# Patient Record
Sex: MALE | Race: BLACK OR AFRICAN AMERICAN | NOT HISPANIC OR LATINO
[De-identification: names, ages, dates, MRNs, and addresses within clinical notes are randomized per-mention and may not be internally consistent; named-entity substitution may affect disease eponyms.]

---

## 2018-01-30 ENCOUNTER — APPOINTMENT (OUTPATIENT)
Dept: CARDIOLOGY | Facility: CLINIC | Age: 58
End: 2018-01-30
Payer: COMMERCIAL

## 2018-01-30 ENCOUNTER — RECORD ABSTRACTING (OUTPATIENT)
Age: 58
End: 2018-01-30

## 2018-01-30 DIAGNOSIS — Z82.49 FAMILY HISTORY OF ISCHEMIC HEART DISEASE AND OTHER DISEASES OF THE CIRCULATORY SYSTEM: ICD-10-CM

## 2018-01-30 DIAGNOSIS — Z87.19 PERSONAL HISTORY OF OTHER DISEASES OF THE DIGESTIVE SYSTEM: ICD-10-CM

## 2018-01-30 DIAGNOSIS — Z84.89 FAMILY HISTORY OF OTHER SPECIFIED CONDITIONS: ICD-10-CM

## 2018-01-30 DIAGNOSIS — Z78.9 OTHER SPECIFIED HEALTH STATUS: ICD-10-CM

## 2018-01-30 PROCEDURE — A9500: CPT

## 2018-01-30 PROCEDURE — 93015 CV STRESS TEST SUPVJ I&R: CPT

## 2018-01-30 PROCEDURE — 78452 HT MUSCLE IMAGE SPECT MULT: CPT

## 2018-01-30 RX ORDER — AMLODIPINE BESYLATE 5 MG/1
5 TABLET ORAL
Refills: 0 | Status: ACTIVE | COMMUNITY

## 2018-01-30 RX ORDER — METOPROLOL TARTRATE 25 MG/1
25 TABLET, FILM COATED ORAL
Refills: 0 | Status: ACTIVE | COMMUNITY

## 2018-02-01 ENCOUNTER — APPOINTMENT (OUTPATIENT)
Dept: CARDIOLOGY | Facility: CLINIC | Age: 58
End: 2018-02-01
Payer: COMMERCIAL

## 2018-02-01 PROCEDURE — ZZZZZ: CPT

## 2018-02-12 RX ORDER — KIT FOR THE PREPARATION OF TECHNETIUM TC99M SESTAMIBI 1 MG/5ML
INJECTION, POWDER, LYOPHILIZED, FOR SOLUTION PARENTERAL
Refills: 0 | Status: COMPLETED | OUTPATIENT
Start: 2018-02-12

## 2018-02-12 RX ADMIN — KIT FOR THE PREPARATION OF TECHNETIUM TC99M SESTAMIBI 0: 1 INJECTION, POWDER, LYOPHILIZED, FOR SOLUTION PARENTERAL at 00:00

## 2018-02-14 RX ORDER — KIT FOR THE PREPARATION OF TECHNETIUM TC99M SESTAMIBI 1 MG/5ML
INJECTION, POWDER, LYOPHILIZED, FOR SOLUTION PARENTERAL
Refills: 0 | Status: COMPLETED | OUTPATIENT
Start: 2018-02-14

## 2018-02-14 RX ADMIN — KIT FOR THE PREPARATION OF TECHNETIUM TC99M SESTAMIBI 0: 1 INJECTION, POWDER, LYOPHILIZED, FOR SOLUTION PARENTERAL at 00:00

## 2018-02-26 ENCOUNTER — APPOINTMENT (OUTPATIENT)
Dept: CARDIOLOGY | Facility: CLINIC | Age: 58
End: 2018-02-26
Payer: COMMERCIAL

## 2018-02-26 VITALS
WEIGHT: 264 LBS | DIASTOLIC BLOOD PRESSURE: 83 MMHG | SYSTOLIC BLOOD PRESSURE: 124 MMHG | RESPIRATION RATE: 12 BRPM | HEIGHT: 72 IN | BODY MASS INDEX: 35.76 KG/M2

## 2018-02-26 DIAGNOSIS — Z00.00 ENCOUNTER FOR GENERAL ADULT MEDICAL EXAMINATION W/OUT ABNORMAL FINDINGS: ICD-10-CM

## 2018-02-26 PROCEDURE — 99214 OFFICE O/P EST MOD 30 MIN: CPT

## 2018-02-26 RX ORDER — HYDROCHLOROTHIAZIDE 12.5 MG/1
12.5 CAPSULE ORAL
Refills: 0 | Status: ACTIVE | COMMUNITY

## 2018-06-13 ENCOUNTER — APPOINTMENT (OUTPATIENT)
Dept: CARDIOLOGY | Facility: CLINIC | Age: 58
End: 2018-06-13
Payer: COMMERCIAL

## 2018-06-13 VITALS
WEIGHT: 260 LBS | SYSTOLIC BLOOD PRESSURE: 125 MMHG | BODY MASS INDEX: 35.21 KG/M2 | HEART RATE: 76 BPM | DIASTOLIC BLOOD PRESSURE: 90 MMHG | HEIGHT: 72 IN | RESPIRATION RATE: 16 BRPM

## 2018-06-13 DIAGNOSIS — R06.09 OTHER FORMS OF DYSPNEA: ICD-10-CM

## 2018-06-13 PROCEDURE — 99214 OFFICE O/P EST MOD 30 MIN: CPT

## 2018-06-13 PROCEDURE — 93000 ELECTROCARDIOGRAM COMPLETE: CPT

## 2018-12-04 ENCOUNTER — OTHER (OUTPATIENT)
Age: 58
End: 2018-12-04

## 2018-12-04 ENCOUNTER — APPOINTMENT (OUTPATIENT)
Dept: CARDIOLOGY | Facility: CLINIC | Age: 58
End: 2018-12-04

## 2019-09-03 ENCOUNTER — APPOINTMENT (OUTPATIENT)
Dept: CARDIOLOGY | Facility: CLINIC | Age: 59
End: 2019-09-03
Payer: COMMERCIAL

## 2019-09-03 VITALS
OXYGEN SATURATION: 98 % | RESPIRATION RATE: 14 BRPM | SYSTOLIC BLOOD PRESSURE: 135 MMHG | DIASTOLIC BLOOD PRESSURE: 70 MMHG

## 2019-09-03 PROCEDURE — 93000 ELECTROCARDIOGRAM COMPLETE: CPT

## 2019-09-03 PROCEDURE — 99212 OFFICE O/P EST SF 10 MIN: CPT

## 2019-09-04 ENCOUNTER — NON-APPOINTMENT (OUTPATIENT)
Age: 59
End: 2019-09-04

## 2019-09-04 ENCOUNTER — APPOINTMENT (OUTPATIENT)
Dept: CARDIOLOGY | Facility: CLINIC | Age: 59
End: 2019-09-04
Payer: COMMERCIAL

## 2019-09-04 VITALS
BODY MASS INDEX: 36.03 KG/M2 | OXYGEN SATURATION: 98 % | RESPIRATION RATE: 16 BRPM | HEIGHT: 72 IN | DIASTOLIC BLOOD PRESSURE: 82 MMHG | WEIGHT: 266 LBS | SYSTOLIC BLOOD PRESSURE: 134 MMHG | HEART RATE: 89 BPM

## 2019-09-04 DIAGNOSIS — E66.9 OBESITY, UNSPECIFIED: ICD-10-CM

## 2019-09-04 PROCEDURE — 99214 OFFICE O/P EST MOD 30 MIN: CPT

## 2019-09-04 PROCEDURE — 93000 ELECTROCARDIOGRAM COMPLETE: CPT

## 2019-09-04 NOTE — HISTORY OF PRESENT ILLNESS
[FreeTextEntry1] : He admits that he normally eats a well balanced diet low in fats and meats, however, earlier that day he had pancakes with sausage, butter and syrup and subsequently went to the movies with his wife and ate nachos with cheese.\par \par He has not felt these symptoms since that day.\par \par The patient reports a strong family history of cardiovascular disease; Father- CABG x 3 (81 y/o), Mother- brothers have history of stroke.

## 2019-09-04 NOTE — REASON FOR VISIT
[FreeTextEntry1] : The patient is a 59-year-old  gentleman who has been evaluated in our office for prior history for hypertension and hyperlipidemia who underwent cardiac catheterization back in 2006 which showed "normal coronary arteries".  Mr. Lara reports noticing a "chest pressure" located mid-sternal that slightly radiated to his left arm/shoulder, this occurred at 4:00 am and woke him out of sleep.  He reports belching a couple of time and it eventually subsided in about one hour or more.\par \par He denies associated symptoms such as SOB, BLAKE, PND, orthopnea, palpitations, presyncope, syncope, diaphoresis.

## 2019-09-04 NOTE — DISCUSSION/SUMMARY
[FreeTextEntry1] : 1).  Patient reassured his rather atypical chest complaints seem to be less cardiac in nature and more gastrointestinal; possible GERD-like symptoms from eating sausage, pancakes and nachos late at night.\par \par He is to monitor for chest discomfort or similar symptoms to worsen and/or become more frequent, notify our office immediately and we will consider repeating Nuclear Stress test and/or Cardiac Catheterization.\par \par 2).  Follow up with his Gastroenterologist (Dr. Zuleta) regarding possible evaluation of GERD; endoscopy vs. medical management.\par \par 3).  Diet and lifestyle modification discussed including low fat and low carbohydrate weight reducing diet as well as avoiding trigger foods for GERD.  Continue to implement aerobic exercise regimen few days per week. \par \par 4).  Patient is tolerating cardiac medications without negative side effects, continue with current cardiac medication regimen including HCTZ 12.5 mg QD PRN, Norvasc 5 mg QD and Metoprolol Tartrate 25 mg QD. \par \par 5).  Follow up with PCP regarding routine checkups and blood work, have copy faxed to our office. \par \par 6).  Immediately go to the emergency department and/or report any untoward symptoms to our office.\par \par 7).  Follow up with our office in 5 to 6 months or PRN.

## 2019-09-04 NOTE — ASSESSMENT
[FreeTextEntry1] : EKG 9/4/2019:  The EKG illustrates sinus rhythm, rate of 86, borderline 1st degree AV block, nonspecific Q waves in III and aVF.  Essentially unchanged.\par \par Most recent nuclear stress test last one in January 2018 which was "negative for ischemia".

## 2019-09-04 NOTE — PHYSICAL EXAM
[Normal Appearance] : normal appearance [Normal Conjunctiva] : the conjunctiva exhibited no abnormalities [General Appearance - In No Acute Distress] : no acute distress [Normal Oral Mucosa] : normal oral mucosa [Normal Jugular Venous A Waves Present] : normal jugular venous A waves present [Normal Jugular Venous V Waves Present] : normal jugular venous V waves present [No Jugular Venous Encarnacion A Waves] : no jugular venous encarnacion A waves [] : no respiratory distress [Respiration, Rhythm And Depth] : normal respiratory rhythm and effort [Auscultation Breath Sounds / Voice Sounds] : lungs were clear to auscultation bilaterally [Heart Rate And Rhythm] : heart rate and rhythm were normal [Heart Sounds] : normal S1 and S2 [Edema] : no peripheral edema present [FreeTextEntry1] : Grade I/VI systolic murmur [Bowel Sounds] : normal bowel sounds [Nail Clubbing] : no clubbing of the fingernails [Abnormal Walk] : normal gait [Cyanosis, Localized] : no localized cyanosis [Skin Color & Pigmentation] : normal skin color and pigmentation [Oriented To Time, Place, And Person] : oriented to person, place, and time [Skin Turgor] : normal skin turgor [Impaired Insight] : insight and judgment were intact [Affect] : the affect was normal

## 2020-02-03 ENCOUNTER — APPOINTMENT (OUTPATIENT)
Dept: CARDIOLOGY | Facility: CLINIC | Age: 60
End: 2020-02-03
Payer: COMMERCIAL

## 2020-02-03 ENCOUNTER — NON-APPOINTMENT (OUTPATIENT)
Age: 60
End: 2020-02-03

## 2020-02-03 VITALS
WEIGHT: 263 LBS | RESPIRATION RATE: 14 BRPM | HEIGHT: 72 IN | DIASTOLIC BLOOD PRESSURE: 83 MMHG | BODY MASS INDEX: 35.62 KG/M2 | SYSTOLIC BLOOD PRESSURE: 128 MMHG | HEART RATE: 85 BPM

## 2020-02-03 DIAGNOSIS — R07.9 CHEST PAIN, UNSPECIFIED: ICD-10-CM

## 2020-02-03 DIAGNOSIS — R07.89 OTHER CHEST PAIN: ICD-10-CM

## 2020-02-03 DIAGNOSIS — R94.31 ABNORMAL ELECTROCARDIOGRAM [ECG] [EKG]: ICD-10-CM

## 2020-02-03 DIAGNOSIS — E78.5 HYPERLIPIDEMIA, UNSPECIFIED: ICD-10-CM

## 2020-02-03 DIAGNOSIS — I51.7 CARDIOMEGALY: ICD-10-CM

## 2020-02-03 DIAGNOSIS — I10 ESSENTIAL (PRIMARY) HYPERTENSION: ICD-10-CM

## 2020-02-03 PROCEDURE — 99214 OFFICE O/P EST MOD 30 MIN: CPT

## 2020-02-03 PROCEDURE — 93000 ELECTROCARDIOGRAM COMPLETE: CPT

## 2020-02-03 NOTE — PHYSICAL EXAM
[General Appearance - Well Developed] : well developed [Normal Appearance] : normal appearance [Well Groomed] : well groomed [General Appearance - Well Nourished] : well nourished [No Deformities] : no deformities [General Appearance - In No Acute Distress] : no acute distress [Normal Conjunctiva] : the conjunctiva exhibited no abnormalities [Eyelids - No Xanthelasma] : the eyelids demonstrated no xanthelasmas [Normal Oral Mucosa] : normal oral mucosa [No Oral Pallor] : no oral pallor [No Oral Cyanosis] : no oral cyanosis [Normal Jugular Venous A Waves Present] : normal jugular venous A waves present [Normal Jugular Venous V Waves Present] : normal jugular venous V waves present [No Jugular Venous Encarnacion A Waves] : no jugular venous encarnacion A waves [Respiration, Rhythm And Depth] : normal respiratory rhythm and effort [Exaggerated Use Of Accessory Muscles For Inspiration] : no accessory muscle use [Auscultation Breath Sounds / Voice Sounds] : lungs were clear to auscultation bilaterally [Heart Rate And Rhythm] : heart rate and rhythm were normal [Heart Sounds] : normal S1 and S2 [FreeTextEntry1] : Regular rhythm grade 1/6 systolic murmur [Abdomen Soft] : soft [Abdomen Tenderness] : non-tender [Abdomen Mass (___ Cm)] : no abdominal mass palpated [Gait - Sufficient For Exercise Testing] : the gait was sufficient for exercise testing [Abnormal Walk] : normal gait [Nail Clubbing] : no clubbing of the fingernails [Cyanosis, Localized] : no localized cyanosis [Petechial Hemorrhages (___cm)] : no petechial hemorrhages [Skin Color & Pigmentation] : normal skin color and pigmentation [] : no rash [No Venous Stasis] : no venous stasis [Skin Lesions] : no skin lesions [No Skin Ulcers] : no skin ulcer [No Xanthoma] : no  xanthoma was observed [Oriented To Time, Place, And Person] : oriented to person, place, and time [Mood] : the mood was normal [Affect] : the affect was normal [No Anxiety] : not feeling anxious

## 2020-02-03 NOTE — HISTORY OF PRESENT ILLNESS
[FreeTextEntry1] : He has had some other somatic complaints such as right shoulder discomfort from arthritis but otherwise seems to be doing better;\par \par He intends to be traveling to have her care and trying to get through political issues to open up an orphanage in [place];\par \par

## 2020-02-03 NOTE — REASON FOR VISIT
[Follow-Up - Clinic] : a clinic follow-up of [FreeTextEntry1] : The patient is a 59-year-old  gentleman with known history for hypertension and hyperlipidemia who had an atypical chest pain syndrome in the past and underwent cardiac testing including remote history for cardiac catheterization (2006-normal coronaries), and most recent nuclear stress test was in December 2018 and was negative for ischemia;\par \par Fortunately, the patient reports he's been feeling well lately without significant symptoms of chest pain, shortness of breath, palpitations or dizziness;

## 2020-02-03 NOTE — ASSESSMENT
[FreeTextEntry1] : EKG demonstrated normal sinus rhythm at a rate of 85 and within normal limits;\par \par In summary the patient is a 59-year-old gentleman with known history for mild hypertension and hyperlipidemia for which blood pressure appears controlled on current medical record\par \par Plan:\par \par Patient recommended to followup to primary care for lingering upper respiratory infection;\par \par Okay to continue current medical record\par \par Timely checkups and laboratory blood tests and lipid profile with primary care;\par \par No additional cardiac workup indicated at this time\par \par Consultation on ongoing efforts for low-carb low-fat weight reducing diet;;;;

## 2020-03-20 ENCOUNTER — INPATIENT (INPATIENT)
Facility: HOSPITAL | Age: 60
LOS: 8 days | Discharge: HOME | DRG: 193 | End: 2020-03-29
Attending: STUDENT IN AN ORGANIZED HEALTH CARE EDUCATION/TRAINING PROGRAM | Admitting: STUDENT IN AN ORGANIZED HEALTH CARE EDUCATION/TRAINING PROGRAM
Payer: COMMERCIAL

## 2020-03-20 VITALS
HEART RATE: 110 BPM | DIASTOLIC BLOOD PRESSURE: 78 MMHG | SYSTOLIC BLOOD PRESSURE: 155 MMHG | WEIGHT: 253.97 LBS | OXYGEN SATURATION: 91 % | RESPIRATION RATE: 22 BRPM | HEIGHT: 72 IN | TEMPERATURE: 101 F

## 2020-03-21 DIAGNOSIS — E11.9 TYPE 2 DIABETES MELLITUS WITHOUT COMPLICATIONS: ICD-10-CM

## 2020-03-21 DIAGNOSIS — J18.9 PNEUMONIA, UNSPECIFIED ORGANISM: ICD-10-CM

## 2020-03-21 DIAGNOSIS — E78.5 HYPERLIPIDEMIA, UNSPECIFIED: ICD-10-CM

## 2020-03-21 DIAGNOSIS — Z98.890 OTHER SPECIFIED POSTPROCEDURAL STATES: Chronic | ICD-10-CM

## 2020-03-21 DIAGNOSIS — D50.9 IRON DEFICIENCY ANEMIA, UNSPECIFIED: ICD-10-CM

## 2020-03-21 DIAGNOSIS — Z29.9 ENCOUNTER FOR PROPHYLACTIC MEASURES, UNSPECIFIED: ICD-10-CM

## 2020-03-21 DIAGNOSIS — J96.01 ACUTE RESPIRATORY FAILURE WITH HYPOXIA: ICD-10-CM

## 2020-03-21 DIAGNOSIS — I10 ESSENTIAL (PRIMARY) HYPERTENSION: ICD-10-CM

## 2020-03-21 DIAGNOSIS — N40.0 BENIGN PROSTATIC HYPERPLASIA WITHOUT LOWER URINARY TRACT SYMPTOMS: ICD-10-CM

## 2020-03-21 LAB
ALBUMIN SERPL ELPH-MCNC: 2.7 G/DL — LOW (ref 3.3–5)
ALBUMIN SERPL ELPH-MCNC: 2.9 G/DL — LOW (ref 3.3–5)
ALP SERPL-CCNC: 47 U/L — SIGNIFICANT CHANGE UP (ref 40–120)
ALP SERPL-CCNC: 49 U/L — SIGNIFICANT CHANGE UP (ref 40–120)
ALT FLD-CCNC: 47 U/L — SIGNIFICANT CHANGE UP (ref 12–78)
ALT FLD-CCNC: 51 U/L — SIGNIFICANT CHANGE UP (ref 12–78)
ANION GAP SERPL CALC-SCNC: 8 MMOL/L — SIGNIFICANT CHANGE UP (ref 5–17)
ANION GAP SERPL CALC-SCNC: 9 MMOL/L — SIGNIFICANT CHANGE UP (ref 5–17)
ANISOCYTOSIS BLD QL: SLIGHT — SIGNIFICANT CHANGE UP
APTT BLD: 30 SEC — SIGNIFICANT CHANGE UP (ref 28.5–37)
AST SERPL-CCNC: 54 U/L — HIGH (ref 15–37)
AST SERPL-CCNC: 79 U/L — HIGH (ref 15–37)
BASOPHILS # BLD AUTO: 0.02 K/UL — SIGNIFICANT CHANGE UP (ref 0–0.2)
BASOPHILS # BLD AUTO: 0.03 K/UL — SIGNIFICANT CHANGE UP (ref 0–0.2)
BASOPHILS NFR BLD AUTO: 0.2 % — SIGNIFICANT CHANGE UP (ref 0–2)
BASOPHILS NFR BLD AUTO: 0.3 % — SIGNIFICANT CHANGE UP (ref 0–2)
BILIRUB SERPL-MCNC: 0.4 MG/DL — SIGNIFICANT CHANGE UP (ref 0.2–1.2)
BILIRUB SERPL-MCNC: 0.6 MG/DL — SIGNIFICANT CHANGE UP (ref 0.2–1.2)
BUN SERPL-MCNC: 9 MG/DL — SIGNIFICANT CHANGE UP (ref 7–23)
BUN SERPL-MCNC: 9 MG/DL — SIGNIFICANT CHANGE UP (ref 7–23)
CALCIUM SERPL-MCNC: 8.2 MG/DL — LOW (ref 8.5–10.1)
CALCIUM SERPL-MCNC: 8.3 MG/DL — LOW (ref 8.5–10.1)
CHLORIDE SERPL-SCNC: 100 MMOL/L — SIGNIFICANT CHANGE UP (ref 96–108)
CHLORIDE SERPL-SCNC: 102 MMOL/L — SIGNIFICANT CHANGE UP (ref 96–108)
CHOLEST SERPL-MCNC: 94 MG/DL — SIGNIFICANT CHANGE UP (ref 10–199)
CK SERPL-CCNC: 162 U/L — SIGNIFICANT CHANGE UP (ref 26–308)
CO2 SERPL-SCNC: 25 MMOL/L — SIGNIFICANT CHANGE UP (ref 22–31)
CO2 SERPL-SCNC: 26 MMOL/L — SIGNIFICANT CHANGE UP (ref 22–31)
CREAT SERPL-MCNC: 0.95 MG/DL — SIGNIFICANT CHANGE UP (ref 0.5–1.3)
CREAT SERPL-MCNC: 0.97 MG/DL — SIGNIFICANT CHANGE UP (ref 0.5–1.3)
CRP SERPL-MCNC: 13.29 MG/DL — HIGH (ref 0–0.4)
CRP SERPL-MCNC: 13.78 MG/DL — HIGH (ref 0–0.4)
EOSINOPHIL # BLD AUTO: 0 K/UL — SIGNIFICANT CHANGE UP (ref 0–0.5)
EOSINOPHIL # BLD AUTO: 0.01 K/UL — SIGNIFICANT CHANGE UP (ref 0–0.5)
EOSINOPHIL NFR BLD AUTO: 0 % — SIGNIFICANT CHANGE UP (ref 0–6)
EOSINOPHIL NFR BLD AUTO: 0.1 % — SIGNIFICANT CHANGE UP (ref 0–6)
ERYTHROCYTE [SEDIMENTATION RATE] IN BLOOD: 14 MM/HR — SIGNIFICANT CHANGE UP (ref 0–20)
FERRITIN SERPL-MCNC: 579 NG/ML — HIGH (ref 30–400)
FLU A RESULT: SIGNIFICANT CHANGE UP
FLU A RESULT: SIGNIFICANT CHANGE UP
FLUAV AG NPH QL: SIGNIFICANT CHANGE UP
FLUBV AG NPH QL: SIGNIFICANT CHANGE UP
GLUCOSE SERPL-MCNC: 102 MG/DL — HIGH (ref 70–99)
GLUCOSE SERPL-MCNC: 143 MG/DL — HIGH (ref 70–99)
HAV IGM SER-ACNC: SIGNIFICANT CHANGE UP
HBA1C BLD-MCNC: 6.7 % — HIGH (ref 4–5.6)
HBV CORE IGM SER-ACNC: SIGNIFICANT CHANGE UP
HBV SURFACE AG SER-ACNC: SIGNIFICANT CHANGE UP
HCT VFR BLD CALC: 37.4 % — LOW (ref 39–50)
HCT VFR BLD CALC: 37.5 % — LOW (ref 39–50)
HCV AB S/CO SERPL IA: 0.15 S/CO — SIGNIFICANT CHANGE UP (ref 0–0.99)
HCV AB SERPL-IMP: SIGNIFICANT CHANGE UP
HDLC SERPL-MCNC: 23 MG/DL — LOW
HGB BLD-MCNC: 12.3 G/DL — LOW (ref 13–17)
HGB BLD-MCNC: 12.6 G/DL — LOW (ref 13–17)
IMM GRANULOCYTES NFR BLD AUTO: 0.5 % — SIGNIFICANT CHANGE UP (ref 0–1.5)
IMM GRANULOCYTES NFR BLD AUTO: 0.6 % — SIGNIFICANT CHANGE UP (ref 0–1.5)
INR BLD: 1.23 RATIO — HIGH (ref 0.88–1.16)
LACTATE SERPL-SCNC: 1.1 MMOL/L — SIGNIFICANT CHANGE UP (ref 0.7–2)
LDH SERPL L TO P-CCNC: 543 U/L — HIGH (ref 50–242)
LDH SERPL L TO P-CCNC: 567 U/L — HIGH (ref 50–242)
LIPID PNL WITH DIRECT LDL SERPL: 55 MG/DL — SIGNIFICANT CHANGE UP
LYMPHOCYTES # BLD AUTO: 0.63 K/UL — LOW (ref 1–3.3)
LYMPHOCYTES # BLD AUTO: 1.09 K/UL — SIGNIFICANT CHANGE UP (ref 1–3.3)
LYMPHOCYTES # BLD AUTO: 12.2 % — LOW (ref 13–44)
LYMPHOCYTES # BLD AUTO: 6.1 % — LOW (ref 13–44)
MACROCYTES BLD QL: SLIGHT — SIGNIFICANT CHANGE UP
MANUAL SMEAR VERIFICATION: YES — SIGNIFICANT CHANGE UP
MCHC RBC-ENTMCNC: 24.6 PG — LOW (ref 27–34)
MCHC RBC-ENTMCNC: 24.8 PG — LOW (ref 27–34)
MCHC RBC-ENTMCNC: 32.8 GM/DL — SIGNIFICANT CHANGE UP (ref 32–36)
MCHC RBC-ENTMCNC: 33.7 GM/DL — SIGNIFICANT CHANGE UP (ref 32–36)
MCV RBC AUTO: 73.5 FL — LOW (ref 80–100)
MCV RBC AUTO: 75 FL — LOW (ref 80–100)
MICROCYTES BLD QL: SLIGHT — SIGNIFICANT CHANGE UP
MONOCYTES # BLD AUTO: 0.29 K/UL — SIGNIFICANT CHANGE UP (ref 0–0.9)
MONOCYTES # BLD AUTO: 0.43 K/UL — SIGNIFICANT CHANGE UP (ref 0–0.9)
MONOCYTES NFR BLD AUTO: 3.2 % — SIGNIFICANT CHANGE UP (ref 2–14)
MONOCYTES NFR BLD AUTO: 4.2 % — SIGNIFICANT CHANGE UP (ref 2–14)
NEUTROPHILS # BLD AUTO: 7.5 K/UL — HIGH (ref 1.8–7.4)
NEUTROPHILS # BLD AUTO: 9.22 K/UL — HIGH (ref 1.8–7.4)
NEUTROPHILS NFR BLD AUTO: 83.6 % — HIGH (ref 43–77)
NEUTROPHILS NFR BLD AUTO: 89 % — HIGH (ref 43–77)
NRBC # BLD: 0 /100 WBCS — SIGNIFICANT CHANGE UP (ref 0–0)
NRBC # BLD: 0 /100 WBCS — SIGNIFICANT CHANGE UP (ref 0–0)
PLAT MORPH BLD: NORMAL — SIGNIFICANT CHANGE UP
PLATELET # BLD AUTO: 297 K/UL — SIGNIFICANT CHANGE UP (ref 150–400)
PLATELET # BLD AUTO: 299 K/UL — SIGNIFICANT CHANGE UP (ref 150–400)
POTASSIUM SERPL-MCNC: 3.6 MMOL/L — SIGNIFICANT CHANGE UP (ref 3.5–5.3)
POTASSIUM SERPL-MCNC: 4.7 MMOL/L — SIGNIFICANT CHANGE UP (ref 3.5–5.3)
POTASSIUM SERPL-SCNC: 3.6 MMOL/L — SIGNIFICANT CHANGE UP (ref 3.5–5.3)
POTASSIUM SERPL-SCNC: 4.7 MMOL/L — SIGNIFICANT CHANGE UP (ref 3.5–5.3)
PROCALCITONIN SERPL-MCNC: 0.32 NG/ML — HIGH (ref 0–0.04)
PROT SERPL-MCNC: 6.7 G/DL — SIGNIFICANT CHANGE UP (ref 6–8.3)
PROT SERPL-MCNC: 7 G/DL — SIGNIFICANT CHANGE UP (ref 6–8.3)
PROTHROM AB SERPL-ACNC: 13.9 SEC — HIGH (ref 10–12.9)
RBC # BLD: 5 M/UL — SIGNIFICANT CHANGE UP (ref 4.2–5.8)
RBC # BLD: 5.09 M/UL — SIGNIFICANT CHANGE UP (ref 4.2–5.8)
RBC # FLD: 14 % — SIGNIFICANT CHANGE UP (ref 10.3–14.5)
RBC # FLD: 14.2 % — SIGNIFICANT CHANGE UP (ref 10.3–14.5)
RBC BLD AUTO: ABNORMAL
RSV RESULT: SIGNIFICANT CHANGE UP
RSV RNA RESP QL NAA+PROBE: SIGNIFICANT CHANGE UP
SODIUM SERPL-SCNC: 134 MMOL/L — LOW (ref 135–145)
SODIUM SERPL-SCNC: 136 MMOL/L — SIGNIFICANT CHANGE UP (ref 135–145)
TOTAL CHOLESTEROL/HDL RATIO MEASUREMENT: 4.1 RATIO — SIGNIFICANT CHANGE UP (ref 3.4–9.6)
TRIGL SERPL-MCNC: 80 MG/DL — SIGNIFICANT CHANGE UP (ref 10–149)
TROPONIN I SERPL-MCNC: <.015 NG/ML — SIGNIFICANT CHANGE UP (ref 0.01–0.04)
TSH SERPL-MCNC: 1.78 UIU/ML — SIGNIFICANT CHANGE UP (ref 0.36–3.74)
WBC # BLD: 10.35 K/UL — SIGNIFICANT CHANGE UP (ref 3.8–10.5)
WBC # BLD: 8.97 K/UL — SIGNIFICANT CHANGE UP (ref 3.8–10.5)
WBC # FLD AUTO: 10.35 K/UL — SIGNIFICANT CHANGE UP (ref 3.8–10.5)
WBC # FLD AUTO: 8.97 K/UL — SIGNIFICANT CHANGE UP (ref 3.8–10.5)

## 2020-03-21 PROCEDURE — 99223 1ST HOSP IP/OBS HIGH 75: CPT | Mod: GC,AI

## 2020-03-21 PROCEDURE — 71046 X-RAY EXAM CHEST 2 VIEWS: CPT | Mod: 26

## 2020-03-21 PROCEDURE — 99285 EMERGENCY DEPT VISIT HI MDM: CPT

## 2020-03-21 PROCEDURE — 71250 CT THORAX DX C-: CPT | Mod: 26

## 2020-03-21 PROCEDURE — 12345: CPT | Mod: NC

## 2020-03-21 RX ORDER — SODIUM CHLORIDE 9 MG/ML
1000 INJECTION, SOLUTION INTRAVENOUS
Refills: 0 | Status: DISCONTINUED | OUTPATIENT
Start: 2020-03-21 | End: 2020-03-29

## 2020-03-21 RX ORDER — ALFUZOSIN HYDROCHLORIDE 10 MG/1
1 TABLET, EXTENDED RELEASE ORAL
Qty: 0 | Refills: 0 | DISCHARGE

## 2020-03-21 RX ORDER — SODIUM CHLORIDE 9 MG/ML
1000 INJECTION INTRAMUSCULAR; INTRAVENOUS; SUBCUTANEOUS
Refills: 0 | Status: COMPLETED | OUTPATIENT
Start: 2020-03-21 | End: 2020-03-21

## 2020-03-21 RX ORDER — DEXTROSE 50 % IN WATER 50 %
25 SYRINGE (ML) INTRAVENOUS ONCE
Refills: 0 | Status: DISCONTINUED | OUTPATIENT
Start: 2020-03-21 | End: 2020-03-29

## 2020-03-21 RX ORDER — ACETAMINOPHEN 500 MG
1000 TABLET ORAL EVERY 6 HOURS
Refills: 0 | Status: DISCONTINUED | OUTPATIENT
Start: 2020-03-21 | End: 2020-03-29

## 2020-03-21 RX ORDER — LANOLIN ALCOHOL/MO/W.PET/CERES
5 CREAM (GRAM) TOPICAL AT BEDTIME
Refills: 0 | Status: DISCONTINUED | OUTPATIENT
Start: 2020-03-21 | End: 2020-03-29

## 2020-03-21 RX ORDER — AMLODIPINE BESYLATE 2.5 MG/1
10 TABLET ORAL DAILY
Refills: 0 | Status: DISCONTINUED | OUTPATIENT
Start: 2020-03-21 | End: 2020-03-29

## 2020-03-21 RX ORDER — ACETAMINOPHEN 500 MG
650 TABLET ORAL ONCE
Refills: 0 | Status: COMPLETED | OUTPATIENT
Start: 2020-03-21 | End: 2020-03-21

## 2020-03-21 RX ORDER — CEFTRIAXONE 500 MG/1
1000 INJECTION, POWDER, FOR SOLUTION INTRAMUSCULAR; INTRAVENOUS EVERY 24 HOURS
Refills: 0 | Status: DISCONTINUED | OUTPATIENT
Start: 2020-03-21 | End: 2020-03-24

## 2020-03-21 RX ORDER — ENOXAPARIN SODIUM 100 MG/ML
40 INJECTION SUBCUTANEOUS AT BEDTIME
Refills: 0 | Status: DISCONTINUED | OUTPATIENT
Start: 2020-03-21 | End: 2020-03-29

## 2020-03-21 RX ORDER — AMLODIPINE BESYLATE 2.5 MG/1
1 TABLET ORAL
Qty: 0 | Refills: 0 | DISCHARGE

## 2020-03-21 RX ORDER — DEXTROSE 50 % IN WATER 50 %
12.5 SYRINGE (ML) INTRAVENOUS ONCE
Refills: 0 | Status: DISCONTINUED | OUTPATIENT
Start: 2020-03-21 | End: 2020-03-29

## 2020-03-21 RX ORDER — INSULIN LISPRO 100/ML
VIAL (ML) SUBCUTANEOUS AT BEDTIME
Refills: 0 | Status: DISCONTINUED | OUTPATIENT
Start: 2020-03-21 | End: 2020-03-29

## 2020-03-21 RX ORDER — CEFTRIAXONE 500 MG/1
1000 INJECTION, POWDER, FOR SOLUTION INTRAMUSCULAR; INTRAVENOUS ONCE
Refills: 0 | Status: COMPLETED | OUTPATIENT
Start: 2020-03-21 | End: 2020-03-21

## 2020-03-21 RX ORDER — METOPROLOL TARTRATE 50 MG
25 TABLET ORAL DAILY
Refills: 0 | Status: DISCONTINUED | OUTPATIENT
Start: 2020-03-21 | End: 2020-03-23

## 2020-03-21 RX ORDER — SACCHAROMYCES BOULARDII 250 MG
250 POWDER IN PACKET (EA) ORAL
Refills: 0 | Status: DISCONTINUED | OUTPATIENT
Start: 2020-03-21 | End: 2020-03-29

## 2020-03-21 RX ORDER — DEXTROSE 50 % IN WATER 50 %
15 SYRINGE (ML) INTRAVENOUS ONCE
Refills: 0 | Status: DISCONTINUED | OUTPATIENT
Start: 2020-03-21 | End: 2020-03-29

## 2020-03-21 RX ORDER — TAMSULOSIN HYDROCHLORIDE 0.4 MG/1
0.8 CAPSULE ORAL AT BEDTIME
Refills: 0 | Status: DISCONTINUED | OUTPATIENT
Start: 2020-03-21 | End: 2020-03-29

## 2020-03-21 RX ORDER — GLUCAGON INJECTION, SOLUTION 0.5 MG/.1ML
1 INJECTION, SOLUTION SUBCUTANEOUS ONCE
Refills: 0 | Status: DISCONTINUED | OUTPATIENT
Start: 2020-03-21 | End: 2020-03-29

## 2020-03-21 RX ORDER — ATORVASTATIN CALCIUM 80 MG/1
10 TABLET, FILM COATED ORAL AT BEDTIME
Refills: 0 | Status: DISCONTINUED | OUTPATIENT
Start: 2020-03-21 | End: 2020-03-29

## 2020-03-21 RX ORDER — METFORMIN HYDROCHLORIDE 850 MG/1
1 TABLET ORAL
Qty: 0 | Refills: 0 | DISCHARGE

## 2020-03-21 RX ORDER — INSULIN LISPRO 100/ML
VIAL (ML) SUBCUTANEOUS
Refills: 0 | Status: DISCONTINUED | OUTPATIENT
Start: 2020-03-21 | End: 2020-03-29

## 2020-03-21 RX ORDER — AZITHROMYCIN 500 MG/1
500 TABLET, FILM COATED ORAL EVERY 24 HOURS
Refills: 0 | Status: DISCONTINUED | OUTPATIENT
Start: 2020-03-21 | End: 2020-03-23

## 2020-03-21 RX ORDER — AZITHROMYCIN 500 MG/1
500 TABLET, FILM COATED ORAL ONCE
Refills: 0 | Status: COMPLETED | OUTPATIENT
Start: 2020-03-21 | End: 2020-03-21

## 2020-03-21 RX ADMIN — SODIUM CHLORIDE 1000 MILLILITER(S): 9 INJECTION INTRAMUSCULAR; INTRAVENOUS; SUBCUTANEOUS at 01:30

## 2020-03-21 RX ADMIN — Medication 25 MILLIGRAM(S): at 06:27

## 2020-03-21 RX ADMIN — Medication 650 MILLIGRAM(S): at 01:30

## 2020-03-21 RX ADMIN — Medication 650 MILLIGRAM(S): at 00:30

## 2020-03-21 RX ADMIN — SODIUM CHLORIDE 1000 MILLILITER(S): 9 INJECTION INTRAMUSCULAR; INTRAVENOUS; SUBCUTANEOUS at 00:30

## 2020-03-21 RX ADMIN — Medication 250 MILLIGRAM(S): at 18:17

## 2020-03-21 RX ADMIN — AZITHROMYCIN 500 MILLIGRAM(S): 500 TABLET, FILM COATED ORAL at 02:30

## 2020-03-21 RX ADMIN — CEFTRIAXONE 1000 MILLIGRAM(S): 500 INJECTION, POWDER, FOR SOLUTION INTRAMUSCULAR; INTRAVENOUS at 01:15

## 2020-03-21 RX ADMIN — AZITHROMYCIN 255 MILLIGRAM(S): 500 TABLET, FILM COATED ORAL at 01:30

## 2020-03-21 RX ADMIN — Medication 1000 MILLIGRAM(S): at 17:00

## 2020-03-21 RX ADMIN — AMLODIPINE BESYLATE 10 MILLIGRAM(S): 2.5 TABLET ORAL at 06:27

## 2020-03-21 RX ADMIN — Medication 1000 MILLIGRAM(S): at 14:02

## 2020-03-21 RX ADMIN — CEFTRIAXONE 100 MILLIGRAM(S): 500 INJECTION, POWDER, FOR SOLUTION INTRAMUSCULAR; INTRAVENOUS at 00:30

## 2020-03-21 RX ADMIN — Medication 250 MILLIGRAM(S): at 06:28

## 2020-03-21 NOTE — PROGRESS NOTE ADULT - PROBLEM SELECTOR PLAN 8
start statin drug  check lipid profile  was previously on statin but stopped as his numbers improved

## 2020-03-21 NOTE — H&P ADULT - NSHPSOCIALHISTORY_GEN_ALL_CORE
Lives at home,   Recent travel to [place]  Denies tobacco use  Denies EtOH use  Denies illicit drug use

## 2020-03-21 NOTE — H&P ADULT - NSHPREVIEWOFSYSTEMS_GEN_ALL_CORE
CONSTITUTIONAL: admits fevers and fatigue  HEENT: denies blurred vision, sore throat; admits congestion  SKIN: denies new lesions, rash  CARDIOVASCULAR: denies chest pain, chest pressure, palpitations  RESPIRATORY: admits dyspnea, dyspnea on exertion, cough  GASTROINTESTINAL: denies nausea, vomiting, diarrhea, abdominal pain  GENITOURINARY: denies dysuria, discharge  NEUROLOGICAL: denies numbness, headache, focal weakness  MUSCULOSKELETAL: denies new joint pain, muscle aches  HEMATOLOGIC: denies gross bleeding, bruising  LYMPHATICS: denies enlarged lymph nodes, extremity swelling  PSYCHIATRIC: denies recent changes in anxiety, depression  ENDOCRINOLOGIC: denies cold or heat intolerance CONSTITUTIONAL: admits fevers and fatigue  HEENT: denies blurred vision, sore throat; admits congestion  SKIN: denies new lesions, rash  CARDIOVASCULAR: denies chest pain, chest pressure, palpitations  RESPIRATORY: admits dyspnea, dyspnea on exertion, denies cough  GASTROINTESTINAL: denies nausea, vomiting, diarrhea, abdominal pain  GENITOURINARY: denies dysuria, discharge  NEUROLOGICAL: denies numbness, headache, focal weakness  MUSCULOSKELETAL: denies new joint pain, muscle aches  HEMATOLOGIC: denies gross bleeding, bruising  LYMPHATICS: denies enlarged lymph nodes, extremity swelling  PSYCHIATRIC: denies recent changes in anxiety, depression  ENDOCRINOLOGIC: denies cold or heat intolerance

## 2020-03-21 NOTE — PROGRESS NOTE ADULT - PROBLEM SELECTOR PLAN 6
hold home metformin  c/w low dose insulin corrective scale  monitor blood glucose, hypoglycemia protocol  check glycosylated hemoglobin level

## 2020-03-21 NOTE — ED ADULT NURSE NOTE - OBJECTIVE STATEMENT
pt reports recent travel to [facility], +fever SOB sent from urgent care b/l PNA and hypoxic  o2 sat 88% via room air

## 2020-03-21 NOTE — H&P ADULT - PROBLEM SELECTOR PLAN 1
likely secondary to bilateral pneumonia  admit to medicine with remote tele/cont pulse oximetry  found to be hypoxic to 88% on RA at urgent care, 91% on RA in [facility] ED  supplemental O2 as needed, monitor for increasing O2 requirements

## 2020-03-21 NOTE — ED PROVIDER NOTE - OBJECTIVE STATEMENT
58 yo M with hx HTN, DM p/w ~ 1 week ago returned from [facility] and [place] p/w fever, cough, congestion x past several days. Pt was seen by urgent care, found to have bl pna, and hypoxia ( ~ 88% ra), and sent for eval. no chest pain. no abd pain. Pt with moderate dyspnea, usually with minimal exertion., No abd pain. no n/v/d/. no rash. no agg/allev factors. NO other inj or co.

## 2020-03-21 NOTE — ED ADULT NURSE NOTE - PMH
BPH (benign prostatic hyperplasia)    Cardiomegaly    Dyspnea on exertion    Gastric ulcer    HLD (hyperlipidemia)    HTN (hypertension)    Type 2 diabetes mellitus

## 2020-03-21 NOTE — H&P ADULT - PROBLEM SELECTOR PLAN 8
- DVT ppx: lovenox subQ  - strict isolation precautions, proper PPE use DVT ppx: lovenox subQ  strict isolation precautions, proper PPE use

## 2020-03-21 NOTE — H&P ADULT - ASSESSMENT
60 y/o M with PMHx of HTN, DM2, BPH presented to the ED complaining of fever, cough and congestion for the past few days admitted for acute hypoxic respiratory failure 2/2 bilateral pneumonia, rule out COVID-19.

## 2020-03-21 NOTE — PROGRESS NOTE ADULT - SUBJECTIVE AND OBJECTIVE BOX
CHARTING IN PROGRESS  -----  Patient is a 59y old  Male who presents with a chief complaint of hypoxic respiratory failure; r/o COVID-19; bilateral pneumonia (21 Mar 2020 06:07)      FROM ADMISSION H+P:   HPI:  58 y/o M with PMHx of HTN, DM2, BPH presented to the ED complaining of fever and shortness of breath past few days. He recently returned from [place] ([facility] and [place]) approximately one week ago - took chloroquine for his trip. He developed difficulty breathing and was seen in urgent care. At urgent care, noted to be hypoxic to 88% on room air with bilateral pneumonia and referred to the ED for further evaluation. He admits to dyspnea mostly with exertion. Denies chest pain, palpitations, abdominal pain, n/v/d. States that he is otherwise feeling well. Had no sick contacts. He states that his symptoms started about 6 days PTA. No other complaints.    In the ED, VSS although SpO2 91% on RA. Labs notable for: lymphopenia, normal WBC although with neutrophilic shift, H/H 12.6/37.4, Glucose 143, AST 79. CXR with bilateral infiltrates. EKG: Sinus tachycardia at 102bpm, no acute ST-T changes. Given rocephin and azithro in ED. (21 Mar 2020 01:48)      ----  INTERVAL HPI/OVERNIGHT EVENTS: Pt seen and evaluated at the bedside. No acute overnight events occurred.     ----  PAST MEDICAL & SURGICAL HISTORY:  Gastric ulcer  Dyspnea on exertion  Cardiomegaly  HLD (hyperlipidemia)  Type 2 diabetes mellitus  BPH (benign prostatic hyperplasia)  HTN (hypertension)  History of endoscopy      FAMILY HISTORY:  FH: CAD (coronary artery disease): sister (with hx of coronary stent) and father (with history of CABG)  Family history of CABG: father      Allergies    No Known Allergies    Intolerances        ----  REVIEW OF SYSTEMS:  CONSTITUTIONAL: denies fever, chills, fatigue, weakness  HEENT: denies blurred vision, sore throat  SKIN: denies new lesions, rash  CARDIOVASCULAR: denies chest pain, chest pressure, palpitations  RESPIRATORY: denies shortness of breath, sputum production  GASTROINTESTINAL: denies nausea, vomiting, diarrhea, abdominal pain  GENITOURINARY: denies dysuria, discharge  NEUROLOGICAL: denies numbness, headache, focal weakness  MUSCULOSKELETAL: denies new joint pain, muscle aches  HEMATOLOGIC: denies gross bleeding, bruising  LYMPHATICS: denies enlarged lymph nodes, extremity swelling  PSYCHIATRIC: denies recent changes in anxiety, depression  ENDOCRINOLOGIC: denies sweating, cold or heat intolerance    ----  PHYSICAL EXAM:  GENERAL: patient appears well, no acute distress, appropriately interactive  EYES: sclera clear, no exudates  ENMT: oropharynx clear without erythema, moist mucous membranes  NECK: supple, soft, no thyromegaly noted  LUNGS: good air entry bilaterally, clear to auscultation, symmetric breath sounds, no wheezing or rhonchi appreciated  HEART: soft S1/S2, regular rate and rhythm, no murmurs noted, no noted edema to b/l LE  GASTROINTESTINAL: abdomen is soft, nontender, nondistended, normoactive bowel sounds, no palpable masses  INTEGUMENT: good skin turgor, appropriate for ethnicity, appears well perfused, no jaundice noted  MUSCULOSKELETAL: no clubbing or cyanosis, no obvious deformity  NEUROLOGIC: awake, alert, oriented x3, good muscle tone in 4 extremities, no obvious sensory deficits  PSYCHIATRIC: mood is good, affect is congruent with mood, linear and logical thought process  HEME/LYMPH: no palpable supraclavicular nodules, no obvious ecchymosis     T(C): 37.2 (03-21-20 @ 06:29), Max: 38.2 (03-20-20 @ 23:46)  HR: 87 (03-21-20 @ 06:29) (87 - 110)  BP: 119/66 (03-21-20 @ 06:29) (119/66 - 155/78)  RR: 20 (03-21-20 @ 06:29) (20 - 22)  SpO2: 98% (03-21-20 @ 06:29) (88% - 98%)  Wt(kg): --    ----  I&O's Summary      LABS:                        12.3   8.97  )-----------( 299      ( 21 Mar 2020 08:38 )             37.5     03-21    136  |  102  |  9   ----------------------------<  102<H>  3.6   |  26  |  0.95    Ca    8.2<L>      21 Mar 2020 08:38    TPro  6.7  /  Alb  2.7<L>  /  TBili  0.4  /  DBili  x   /  AST  54<H>  /  ALT  47  /  AlkPhos  47  03-21    PT/INR - ( 21 Mar 2020 08:38 )   PT: 13.9 sec;   INR: 1.23 ratio         PTT - ( 21 Mar 2020 08:38 )  PTT:30.0 sec    CAPILLARY BLOOD GLUCOSE      POCT Blood Glucose.: 120 mg/dL (21 Mar 2020 08:14)                ----  Personally reviewed:  Vital sign trends: [  ] yes    [  ] no     [  ] n/a  Laboratory results: [  ] yes    [  ] no     [  ] n/a  Radiology results: [  ] yes    [  ] no     [  ] n/a  Culture results: [  ] yes    [  ] no     [  ] n/a  Consultant recommendations: [  ] yes    [  ] no     [  ] n/a CHARTING IN PROGRESS  -----  Patient is a 59y old  Male who presents with a chief complaint of hypoxic respiratory failure; r/o COVID-19; bilateral pneumonia (21 Mar 2020 06:07)      FROM ADMISSION H+P:   HPI:  60 y/o M with PMHx of HTN, DM2, BPH presented to the ED complaining of fever and shortness of breath past few days. He recently [place] [place] approximately one week ago - took chloroquine for his trip. He developed difficulty breathing and was seen in urgent care. At urgent care, noted to be hypoxic to 88% on room air with bilateral pneumonia and referred to the ED for further evaluation. He admits to dyspnea mostly with exertion. Denies chest pain, palpitations, abdominal pain, n/v/d. States that he is otherwise feeling well. Had no sick contacts. He states that his symptoms started about 6 days PTA. No other complaints.    ----  INTERVAL HPI/OVERNIGHT EVENTS: Pt seen and evaluated at the bedside. No acute overnight events occurred. Pt reports persistent cough. Feels overall comfortable when he utilizes O2 support but feels a little anxious without it. Overall, reports feeling well.     ----  PAST MEDICAL & SURGICAL HISTORY:  Gastric ulcer  Dyspnea on exertion  Cardiomegaly  HLD (hyperlipidemia)  Type 2 diabetes mellitus  BPH (benign prostatic hyperplasia)  HTN (hypertension)  History of endoscopy      FAMILY HISTORY:  FH: CAD (coronary artery disease): sister (with hx of coronary stent) and father (with history of CABG)  Family history of CABG: father      Allergies    No Known Allergies    Intolerances        ----  REVIEW OF SYSTEMS:  CONSTITUTIONAL: admits fever, chills which are intermittent   CARDIOVASCULAR: denies chest pain, chest pressure, palpitations  RESPIRATORY: admits dry cough, feels dyspneic  GASTROINTESTINAL: denies nausea, vomiting, diarrhea, abdominal pain. appetite is reduced   NEUROLOGICAL: denies numbness, headache, focal weakness  MUSCULOSKELETAL: denies new joint pain, muscle aches   LYMPHATICS: denies enlarged lymph nodes, extremity swelling  PSYCHIATRIC: denies recent changes in anxiety, depression  ENDOCRINOLOGIC: denies sweating, cold or heat intolerance    ----  PHYSICAL EXAM:  GENERAL: patient appears well, nontoxic, sitting up in bed without acute distress   ENMT: oropharynx clear without erythema, moist mucous membranes   LUNGS: reduced air entry b/l, coarse rhonchi throughout b/l chest  HEART: soft S1/S2, regular rate and rhythm, no murmurs noted, no noted edema to b/l LE  GASTROINTESTINAL: abdomen is soft, nontender, nondistended, normoactive bowel sounds, no palpable masses  INTEGUMENT: good skin turgor, appropriate for ethnicity, appears well perfused, no jaundice noted  NEUROLOGIC: awake, alert, oriented x3, good muscle tone in 4 extremities, no obvious sensory deficits  HEME/LYMPH: no palpable supraclavicular nodules, no obvious ecchymosis     T(C): 37.2 (03-21-20 @ 06:29), Max: 38.2 (03-20-20 @ 23:46)  HR: 87 (03-21-20 @ 06:29) (87 - 110)  BP: 119/66 (03-21-20 @ 06:29) (119/66 - 155/78)  RR: 20 (03-21-20 @ 06:29) (20 - 22)  SpO2: 98% (03-21-20 @ 06:29) (88% - 98%)  Wt(kg): --    ----  I&O's Summary      LABS:                        12.3   8.97  )-----------( 299      ( 21 Mar 2020 08:38 )             37.5     03-21    136  |  102  |  9   ----------------------------<  102<H>  3.6   |  26  |  0.95    Ca    8.2<L>      21 Mar 2020 08:38    TPro  6.7  /  Alb  2.7<L>  /  TBili  0.4  /  DBili  x   /  AST  54<H>  /  ALT  47  /  AlkPhos  47  03-21    PT/INR - ( 21 Mar 2020 08:38 )   PT: 13.9 sec;   INR: 1.23 ratio         PTT - ( 21 Mar 2020 08:38 )  PTT:30.0 sec    CAPILLARY BLOOD GLUCOSE      POCT Blood Glucose.: 120 mg/dL (21 Mar 2020 08:14)

## 2020-03-21 NOTE — H&P ADULT - PROBLEM SELECTOR PLAN 3
Acute hypoxic respiratory failure 2/2 PNA in setting of suspected COVID19 viral illness given clinical presentation, recent travel history, labs significant for lymphopenia  - Flu/RSV negative  - Full RVP Panel pending  - continue O2 NC, may use Venturi Mask/NRB if needed  - Avoid HFNC/NIPPV  - Avoid aerosolizing procedures i.e. nebulizations  - Avoid NSAIDs, treat fevers with tylenol  - Maintain strict isolation precautions, use proper PPE   - check urine legionella/strep antigens, MRSA PCR  - daily CBC with diff to follow eosinophils, lymphocytes and neutrophils, daily CK  - check LDH, CRP, ferritin, procalcitonin, ESR, PT/PTT  - lactate, troponin negative  - monitor respiratory status closely   - Code Status: FULL Acute hypoxic respiratory failure 2/2 PNA in setting of suspected COVID19 viral illness given clinical presentation, recent travel history, labs significant for lymphopenia  Flu/RSV negative, Full RVP Panel pending  continue O2 NC, may use Venturi Mask/NRB if needed  Avoid HFNC/NIPPV/aerosolizing procedures i.e. nebulizations  Avoid NSAIDs, treat fevers with tylenol  Maintain strict isolation precautions, use proper PPE   check urine legionella/strep antigens, MRSA PCR  daily CBC with diff to follow eosinophils, lymphocytes and neutrophils, daily CK  check LDH, CRP, ferritin, procalcitonin, ESR, PT/PTT; lactate, troponin negative  monitor respiratory status closely   Code Status: FULL Acute hypoxic respiratory failure 2/2 PNA in setting of suspected COVID19 viral illness given clinical presentation, recent travel history, labs significant for lymphopenia  Flu/RSV negative, Full RVP Panel pending  continue O2 NC, may use Venturi Mask/NRB if needed  Avoid HFNC/NIPPV/aerosolizing procedures i.e. nebulizations  Avoid NSAIDs, treat fevers with tylenol  Maintain strict isolation precautions, use proper PPE   check urine legionella/strep antigens, MRSA PCR  daily CBC with diff to follow eosinophils, lymphocytes and neutrophils, daily CK  check LDH, CRP, ferritin, procalcitonin, ESR, PT/PTT; lactate, troponin negative  NEWS2 score of 7 - high risk for decompensation  monitor respiratory status closely   Code Status: FULL Acute hypoxic respiratory failure 2/2 PNA in setting of suspected COVID19 viral illness given clinical presentation, recent travel history, labs significant for lymphopenia  Flu/RSV negative, Full RVP Panel pending  continue O2 NC, may use Venturi Mask/NRB if needed  Avoid HFNC/NIPPV/aerosolizing procedures i.e. nebulizations  Avoid NSAIDs, treat fevers with tylenol  Maintain strict isolation precautions, use proper PPE   check urine legionella/strep antigens, MRSA PCR  daily CBC with diff to follow eosinophils, lymphocytes and neutrophils, daily CK  check LDH, CRP, ferritin, procalcitonin, ESR, PT/PTT - as near the time for decompensation (on day 6 of symptoms at time of admission per pt report)  lactate, troponin were negative on admission.  NEWS2 score of 7 - high risk for decompensation  monitor respiratory status closely   Code Status: FULL

## 2020-03-21 NOTE — CONSULT NOTE ADULT - SUBJECTIVE AND OBJECTIVE BOX
Date/Time Patient Seen:  		  Referring MD:   Data Reviewed	       Patient is a 59y old  Male who presents with a chief complaint of hypoxic respiratory failure; r/o COVID-19; bilateral pneumonia (21 Mar 2020 01:48)      Subjective/HPI\\    in bed  seen and examined  on isol precs  h and p reviewed  labs reviewed  er provider note reviewed  alert  verbal     History of Present Illness:  Reason for Admission: hypoxic respiratory failure; r/o COVID-19; bilateral pneumonia  History of Present Illness:   60 y/o M with PMHx of HTN, DM2, BPH presented to the ED complaining of fever and shortness of breath past few days. He recently returned from [place] ([facility] and [place]) approximately one week ago - took chloroquine for his trip. He developed difficulty breathing and was seen in urgent care. At urgent care, noted to be hypoxic to 88% on room air with bilateral pneumonia and referred to the ED for further evaluation. He admits to dyspnea mostly with exertion. Denies chest pain, palpitations, abdominal pain, n/v/d. States that he is otherwise feeling well. Had no sick contacts. He states that his symptoms started about 6 days PTA. No other complaints.    In the ED, VSS although SpO2 91% on RA. Labs notable for: lymphopenia, normal WBC although with neutrophilic shift, H/H 12.6/37.4, Glucose 143, AST 79. CXR with bilateral infiltrates. EKG: Sinus tachycardia at 102bpm, no acute ST-T changes. Given rocephin and azithro in ED.      PAST SURGICAL HISTORY:  History of endoscopy.     FAMILY HISTORY:  Family history of CABG, father  FH: CAD (coronary artery disease), sister (with hx of coronary stent) and father (with history of CABG).     Social History:  Social History (marital status, living situation, occupation, tobacco use, alcohol and drug use, and sexual history): Lives at home,   	Recent travel to [place]  	Denies tobacco use  	Denies EtOH use  Denies illicit drug use     Tobacco Screening:  · Core Measure Site	Yes  · Has the patient used tobacco in the past 30 days?	No    Risk Assessment:    Present on Admission:  Deep Venous Thrombosis	no  Pulmonary Embolus	no     Heart Failure:  Does this patient have a history of or has been diagnosed with heart failure? no.       PAST MEDICAL & SURGICAL HISTORY:  Gastric ulcer  Dyspnea on exertion  Cardiomegaly  HLD (hyperlipidemia)  Type 2 diabetes mellitus  BPH (benign prostatic hyperplasia)  HTN (hypertension)  History of endoscopy        Medication list         MEDICATIONS  (STANDING):  amLODIPine   Tablet 10 milliGRAM(s) Oral daily  atorvastatin 10 milliGRAM(s) Oral at bedtime  azithromycin  IVPB 500 milliGRAM(s) IV Intermittent every 24 hours  cefTRIAXone   IVPB 1000 milliGRAM(s) IV Intermittent every 24 hours  dextrose 5%. 1000 milliLiter(s) (50 mL/Hr) IV Continuous <Continuous>  dextrose 50% Injectable 12.5 Gram(s) IV Push once  dextrose 50% Injectable 25 Gram(s) IV Push once  dextrose 50% Injectable 25 Gram(s) IV Push once  enoxaparin Injectable 40 milliGRAM(s) SubCutaneous at bedtime  insulin lispro (HumaLOG) corrective regimen sliding scale   SubCutaneous three times a day before meals  insulin lispro (HumaLOG) corrective regimen sliding scale   SubCutaneous at bedtime  melatonin 5 milliGRAM(s) Oral at bedtime  metoprolol succinate ER 25 milliGRAM(s) Oral daily  saccharomyces boulardii 250 milliGRAM(s) Oral two times a day  tamsulosin 0.8 milliGRAM(s) Oral at bedtime    MEDICATIONS  (PRN):  acetaminophen   Tablet .. 1000 milliGRAM(s) Oral every 6 hours PRN Temp greater or equal to 38C (100.4F), Mild Pain (1 - 3)  dextrose 40% Gel 15 Gram(s) Oral once PRN Blood Glucose LESS THAN 70 milliGRAM(s)/deciliter  glucagon  Injectable 1 milliGRAM(s) IntraMuscular once PRN Glucose LESS THAN 70 milligrams/deciliter         Vitals log        ICU Vital Signs Last 24 Hrs  T(C): 37.1 (21 Mar 2020 03:49), Max: 38.2 (20 Mar 2020 23:46)  T(F): 98.8 (21 Mar 2020 03:49), Max: 100.7 (20 Mar 2020 23:46)  HR: 89 (21 Mar 2020 03:49) (89 - 110)  BP: 141/74 (21 Mar 2020 03:49) (141/74 - 155/78)  BP(mean): --  ABP: --  ABP(mean): --  RR: 20 (21 Mar 2020 03:49) (20 - 22)  SpO2: 95% (21 Mar 2020 03:49) (88% - 95%)           Input and Output:  I&O's Detail      Lab Data                        12.6   10.35 )-----------( 297      ( 21 Mar 2020 01:11 )             37.4     03-21    134<L>  |  100  |  9   ----------------------------<  143<H>  4.7   |  25  |  0.97    Ca    8.3<L>      21 Mar 2020 01:11    TPro  7.0  /  Alb  2.9<L>  /  TBili  0.6  /  DBili  x   /  AST  79<H>  /  ALT  51  /  AlkPhos  49  03-21      CARDIAC MARKERS ( 21 Mar 2020 01:11 )  <.015 ng/mL / x     / x     / x     / x            Review of Systems	  sob  green      Objective     Physical Examination    heart s1s2  lung dec BS  abd soft  head nc  head at  obese  alert  verbal      Pertinent Lab findings & Imaging      Niño:  NO   Adequate UO     I&O's Detail           Discussed with:     Cultures:	        Radiology          ******PRELIMINARY REPORT******    ******PRELIMINARY REPORT******          EXAM:  CT CHEST                            PROCEDURE DATE:  03/21/2020    ******PRELIMINARY REPORT******    ******PRELIMINARY REPORT******              INTERPRETATION:  [facility] RADIOLOGIST PRELIMINARY REPORT    PROCEDURE INFORMATION:   Exam: CT Chest Without Contrast   Exam date and time: 3/21/2020 4:27 AM   Age: 59 years old   Clinical indication: Shortness of breath     TECHNIQUE:   Imaging protocol: Computed tomography of the chest without contrast.     COMPARISON:   DX XR CHEST PA AND LATERAL 3/21/2020 12:26 AM     FINDINGS:   Lungs: Multifocal areas patchy ground-glass opacity some of which is peripheral   with other components are more central. There is involvement bulky upper and   lower lobes. Some of these more dense consolidations appear to demonstrate   ?crazy paving? appearance.   Pleural space: Unremarkable. No pneumothorax. No pleural effusion.   Heart: Mild cardiomegaly.   Aorta: Unremarkable. No aortic aneurysm.   Lymph nodes: Small reactive mediastinal lymph nodes.   Liver: Diffuse hepatic steatosis.   Kidneys and ureters: Hyperdense probable hemorrhagic cyst left kidney. This   measures 2 cm.   Bones/joints: Unremarkable. No acute fracture.   Soft tissues: Unremarkable.     IMPRESSION:   1. Multifocal areas of patchy ground-glass opacity as well as some more dense   consolidations which appear to demonstrate ?crazy paving? appearance. Findings   however appear diffuse in nature involving upper and lower lobes. Findings can   be seen in the setting atypical pneumonia.   2. Hepatic steatosis.   3. Partially imaged probable hemorrhagic cyst left kidney.          ******PRELIMINARY REPORT******    ******PRELIMINARY REPORT******              ROMÁN GALLARDO M.D.;[facility] RADIOLOGIST

## 2020-03-21 NOTE — ED ADULT NURSE REASSESSMENT NOTE - NS ED NURSE REASSESS COMMENT FT1
pt evaluated at bedside by admitting resident Dr Dozier.  admitting orders present.  pt aware of needed urine specimen.  AM labs ordered.  awaiting results and bed assignment.  pt evaluated at bedside by Dr Crockett.

## 2020-03-21 NOTE — H&P ADULT - PROBLEM SELECTOR PLAN 4
- continue home amlodipine and toprol XL  - monitor hemodynamics continue home amlodipine and toprol XL  monitor hemodynamics

## 2020-03-21 NOTE — H&P ADULT - NSICDXPASTMEDICALHX_GEN_ALL_CORE_FT
PAST MEDICAL HISTORY:  BPH (benign prostatic hyperplasia)     HTN (hypertension)     Type 2 diabetes mellitus PAST MEDICAL HISTORY:  BPH (benign prostatic hyperplasia)     Cardiomegaly     Dyspnea on exertion     Gastric ulcer     HLD (hyperlipidemia)     HTN (hypertension)     Type 2 diabetes mellitus

## 2020-03-21 NOTE — H&P ADULT - PROBLEM SELECTOR PLAN 5
- hold home metformin  - start low dose insulin corrective scale  - monitor blood glucose, hypoglycemia protocol  - check glycosylated hemoglobin level in AM hold home metformin  start low dose insulin corrective scale  monitor blood glucose, hypoglycemia protocol  check glycosylated hemoglobin level in AM

## 2020-03-21 NOTE — ED PROVIDER NOTE - CLINICAL SUMMARY MEDICAL DECISION MAKING FREE TEXT BOX
Cough, URI x past several days with recent travel, bl inf on XR, hypoxia. Pos covid risk. check labs, xr, IVF, abx, TBA

## 2020-03-21 NOTE — H&P ADULT - PROBLEM SELECTOR PLAN 2
suspicious for viral pneumonia - including the novel SARS-CoV-2  cannot exclude superimposed bacterial component at this point as well  will continue empiric CAP coverage with azithromycin and rocephin for now  f/u blood cultures; check legionella/strep urinary antigen; check procalcitonin  further antibiotics can be determined pending clinical course  supplemental O2 as needed  will check CT of chest

## 2020-03-21 NOTE — H&P ADULT - PROBLEM SELECTOR PLAN 6
- on alfuzosin 10mg ER at home  - continue therapeutic exchange with flomax on alfuzosin 10mg ER at home  continue therapeutic exchange with flomax

## 2020-03-21 NOTE — H&P ADULT - HISTORY OF PRESENT ILLNESS
60 y/o M with PMHx of HTN, DM2, BPH presented to the ED complaining of fever, cough and congestion for the past few days. He recently returned from [place] ([facility] and [place]) approximately one week ago - took chloroquine for his trip. He developed difficulty breathing and was seen in urgent care. At urgent care, noted to be hypoxic to 88% on room air with bilateral pneumonia and referred to the ED for further evaluation. He admits to dyspnea mostly with exertion. Denies chest pain, palpitations, abdominal pain, n/v/d.    In the ED, VSS although SpO2 91% on RA. Labs notable for: lymphopenia, normal WBC although with neutrophilic shift, H/H 12.6/37.4, Glucose 143, AST 79. CXR with bilateral infiltrates. Given rocephin and azithro in ED. 58 y/o M with PMHx of HTN, DM2, BPH presented to the ED complaining of fever, cough and congestion for the past few days. He recently returned from [place] ([facility] and [place]) approximately one week ago - took chloroquine for his trip. He developed difficulty breathing and was seen in urgent care. At urgent care, noted to be hypoxic to 88% on room air with bilateral pneumonia and referred to the ED for further evaluation. He admits to dyspnea mostly with exertion. Denies chest pain, palpitations, abdominal pain, n/v/d.    In the ED, VSS although SpO2 91% on RA. Labs notable for: lymphopenia, normal WBC although with neutrophilic shift, H/H 12.6/37.4, Glucose 143, AST 79. CXR with bilateral infiltrates. EKG: Sinus tachycardia at 102bpm, no acute ST-T changes. Given rocephin and azithro in ED. 58 y/o M with PMHx of HTN, DM2, BPH presented to the ED complaining of fever and shorntness of breath past few days. He recently returned from [place] ([facility] and [place]) approximately one week ago - took chloroquine for his trip. He developed difficulty breathing and was seen in urgent care. At urgent care, noted to be hypoxic to 88% on room air with bilateral pneumonia and referred to the ED for further evaluation. He admits to dyspnea mostly with exertion. Denies chest pain, palpitations, abdominal pain, n/v/d.    In the ED, VSS although SpO2 91% on RA. Labs notable for: lymphopenia, normal WBC although with neutrophilic shift, H/H 12.6/37.4, Glucose 143, AST 79. CXR with bilateral infiltrates. EKG: Sinus tachycardia at 102bpm, no acute ST-T changes. Given rocephin and azithro in ED. 60 y/o M with PMHx of HTN, DM2, BPH presented to the ED complaining of fever and shortness of breath past few days. He recently returned from [place] ([facility] and [place]) approximately one week ago - took chloroquine for his trip. He developed difficulty breathing and was seen in urgent care. At urgent care, noted to be hypoxic to 88% on room air with bilateral pneumonia and referred to the ED for further evaluation. He admits to dyspnea mostly with exertion. Denies chest pain, palpitations, abdominal pain, n/v/d. States that he is otherwise feeling well. Had no sick contacts. He states that his symptoms started about 6 days PTA. No other complaints.    In the ED, VSS although SpO2 91% on RA. Labs notable for: lymphopenia, normal WBC although with neutrophilic shift, H/H 12.6/37.4, Glucose 143, AST 79. CXR with bilateral infiltrates. EKG: Sinus tachycardia at 102bpm, no acute ST-T changes. Given rocephin and azithro in ED.

## 2020-03-21 NOTE — PROGRESS NOTE ADULT - PROBLEM SELECTOR PLAN 3
Acute hypoxic respiratory failure 2/2 PNA 2/2 suspected COVID19 viral illness given clinical presentation, recent travel history, labs significant for lymphopenia  Flu/RSV negative, Full RVP Panel pending  continue O2 NC, may use Venturi Mask/NRB if needed  Avoid HFNC/NIPPV/aerosolizing procedures i.e. nebulizations  Avoid NSAIDs, treat fevers with tylenol  Maintain strict isolation precautions, use proper PPE   check urine legionella/strep antigens, MRSA PCR  daily CBC with diff to follow eosinophils, lymphocytes and neutrophils, daily CK  check LDH, CRP, ferritin, procalcitonin, ESR, PT/PTT - as near the time for decompensation (on day 6 of symptoms at time of admission per pt report)  lactate, troponin were negative on admission.  NEWS2 score of 7 - high risk for decompensation  monitor respiratory status closely   Code Status: FULL

## 2020-03-21 NOTE — H&P ADULT - NSICDXFAMILYHX_GEN_ALL_CORE_FT
FAMILY HISTORY:  Family history of CABG, father  FH: CAD (coronary artery disease), sister (with hx of coronary stent) and father (with history of CABG)

## 2020-03-21 NOTE — PROGRESS NOTE ADULT - PROBLEM SELECTOR PLAN 2
suspicious for viral pneumonia - including the novel SARS-CoV-2  cannot exclude superimposed bacterial component at this point as well  will continue empiric CAP coverage with azithromycin and rocephin for now  f/u blood cultures; check legionella/strep urinary antigen; check procalcitonin  further antibiotics can be determined pending clinical course  supplemental O2 as needed  CT chest w/ concerning pattern for viral COVID-19 infection

## 2020-03-21 NOTE — H&P ADULT - PROBLEM SELECTOR PLAN 7
- start statin  - check lipid profile start statin drug  check lipid profile start statin drug  check lipid profile  was previously on statin but stopped as his numbers improved

## 2020-03-21 NOTE — ED PROVIDER NOTE - RESPIRATORY, MLM
Breath sounds equal bilaterally. crackles bl bases, no acc muscle use. nl resp effort. no acc muscle use

## 2020-03-21 NOTE — H&P ADULT - NSHPPHYSICALEXAM_GEN_ALL_CORE
T(C): 38.2 (03-20-20 @ 23:46), Max: 38.2 (03-20-20 @ 23:46)  HR: 110 (03-20-20 @ 23:46) (110 - 110)  BP: 155/78 (03-20-20 @ 23:46) (155/78 - 155/78)  RR: 22 (03-20-20 @ 23:46) (22 - 22)  SpO2: 91% (03-20-20 @ 23:46) (91% - 91%)    GENERAL: patient appears well, no acute distress, appropriate, pleasant  EYES: sclera clear, no exudates  ENMT: oropharynx clear without erythema, no exudates, moist mucous membranes  NECK: supple, soft, no thyromegaly noted  LUNGS: decreased breath sounds with dry bibasilar crackles  HEART: soft S1/S2, regular rate and rhythm, no murmurs noted, no lower extremity edema  GASTROINTESTINAL: abdomen is soft, nontender, nondistended, normoactive bowel sounds, no palpable masses  INTEGUMENT: good skin turgor, warm skin, appears well perfused  MUSCULOSKELETAL: no clubbing or cyanosis, no obvious deformity  NEUROLOGIC: awake, alert, oriented x3, good muscle tone in 4 extremities, no obvious sensory deficits  PSYCHIATRIC: mood is good, affect is congruent, linear and logical thought process  HEME/LYMPH: no palpable supraclavicular nodules, no obvious ecchymosis or petechiae T(C): 38.2 (03-20-20 @ 23:46), Max: 38.2 (03-20-20 @ 23:46)  HR: 110 (03-20-20 @ 23:46) (110 - 110)  BP: 155/78 (03-20-20 @ 23:46) (155/78 - 155/78)  RR: 22 (03-20-20 @ 23:46) (22 - 22)  SpO2: 91% (03-20-20 @ 23:46) (91% - 91%)    GENERAL: patient appears in no acute distress, appropriate, pleasant; on supplemental O2 via NC  EYES: sclera clear, no exudates  ENMT: oropharynx clear without erythema, no exudates, moist mucous membranes  NECK: supple, soft, no thyromegaly noted  LUNGS: decreased breath sounds with dry crackles bilaterally  HEART: soft S1/S2, regular rate and rhythm, no murmurs noted, no lower extremity edema  GASTROINTESTINAL: abdomen is soft, nontender, nondistended, normoactive bowel sounds, no palpable masses  INTEGUMENT: good skin turgor, warm skin, appears well perfused  MUSCULOSKELETAL: no clubbing or cyanosis, no obvious deformity  NEUROLOGIC: awake, alert, oriented x3, good muscle tone in 4 extremities, no obvious sensory deficits  PSYCHIATRIC: mood is good, affect is congruent, linear and logical thought process  HEME/LYMPH: no palpable supraclavicular nodules, no obvious ecchymosis or petechiae T(C): 38.2 (03-20-20 @ 23:46), Max: 38.2 (03-20-20 @ 23:46)  HR: 110 (03-20-20 @ 23:46) (110 - 110)  BP: 155/78 (03-20-20 @ 23:46) (155/78 - 155/78)  RR: 22 (03-20-20 @ 23:46) (22 - 22)  SpO2: 91% (03-20-20 @ 23:46) (91% - 91%)    GENERAL: obese patient appears in no acute distress, appropriate, pleasant; on supplemental O2 via NC  EYES: sclera clear, no exudates  ENMT: oropharynx clear without erythema, no exudates, moist mucous membranes  NECK: supple, soft, no thyromegaly noted  LUNGS: decreased breath sounds with dry crackles bilaterally  HEART: soft S1/S2, regular rate and rhythm, no murmurs noted, no lower extremity edema  GASTROINTESTINAL: abdomen is soft, nontender, nondistended, normoactive bowel sounds, no palpable masses  INTEGUMENT: good skin turgor, warm skin, appears well perfused  MUSCULOSKELETAL: no clubbing or cyanosis, no obvious deformity  NEUROLOGIC: awake, alert, oriented x3, good muscle tone in 4 extremities, no obvious sensory deficits  PSYCHIATRIC: mood is good, affect is congruent, linear and logical thought process  HEME/LYMPH: no palpable supraclavicular nodules, no obvious ecchymosis or petechiae

## 2020-03-22 LAB
ALBUMIN SERPL ELPH-MCNC: 2.6 G/DL — LOW (ref 3.3–5)
ALP SERPL-CCNC: 56 U/L — SIGNIFICANT CHANGE UP (ref 40–120)
ALT FLD-CCNC: 58 U/L — SIGNIFICANT CHANGE UP (ref 12–78)
ANION GAP SERPL CALC-SCNC: 8 MMOL/L — SIGNIFICANT CHANGE UP (ref 5–17)
AST SERPL-CCNC: 65 U/L — HIGH (ref 15–37)
BASOPHILS # BLD AUTO: 0.02 K/UL — SIGNIFICANT CHANGE UP (ref 0–0.2)
BASOPHILS NFR BLD AUTO: 0.2 % — SIGNIFICANT CHANGE UP (ref 0–2)
BILIRUB SERPL-MCNC: 0.4 MG/DL — SIGNIFICANT CHANGE UP (ref 0.2–1.2)
BUN SERPL-MCNC: 8 MG/DL — SIGNIFICANT CHANGE UP (ref 7–23)
CALCIUM SERPL-MCNC: 8.4 MG/DL — LOW (ref 8.5–10.1)
CHLORIDE SERPL-SCNC: 101 MMOL/L — SIGNIFICANT CHANGE UP (ref 96–108)
CO2 SERPL-SCNC: 30 MMOL/L — SIGNIFICANT CHANGE UP (ref 22–31)
CREAT SERPL-MCNC: 0.96 MG/DL — SIGNIFICANT CHANGE UP (ref 0.5–1.3)
D DIMER BLD IA.RAPID-MCNC: 339 NG/ML DDU — HIGH
EOSINOPHIL # BLD AUTO: 0.01 K/UL — SIGNIFICANT CHANGE UP (ref 0–0.5)
EOSINOPHIL NFR BLD AUTO: 0.1 % — SIGNIFICANT CHANGE UP (ref 0–6)
FIBRINOGEN PPP-MCNC: 811 MG/DL — HIGH (ref 320–520)
GLUCOSE SERPL-MCNC: 111 MG/DL — HIGH (ref 70–99)
HCT VFR BLD CALC: 37.9 % — LOW (ref 39–50)
HCV AB S/CO SERPL IA: 0.16 S/CO — SIGNIFICANT CHANGE UP (ref 0–0.99)
HCV AB SERPL-IMP: SIGNIFICANT CHANGE UP
HGB BLD-MCNC: 12.4 G/DL — LOW (ref 13–17)
IMM GRANULOCYTES NFR BLD AUTO: 0.5 % — SIGNIFICANT CHANGE UP (ref 0–1.5)
LYMPHOCYTES # BLD AUTO: 1 K/UL — SIGNIFICANT CHANGE UP (ref 1–3.3)
LYMPHOCYTES # BLD AUTO: 10.4 % — LOW (ref 13–44)
MAGNESIUM SERPL-MCNC: 2.1 MG/DL — SIGNIFICANT CHANGE UP (ref 1.6–2.6)
MCHC RBC-ENTMCNC: 24.9 PG — LOW (ref 27–34)
MCHC RBC-ENTMCNC: 32.7 GM/DL — SIGNIFICANT CHANGE UP (ref 32–36)
MCV RBC AUTO: 76.3 FL — LOW (ref 80–100)
MONOCYTES # BLD AUTO: 0.36 K/UL — SIGNIFICANT CHANGE UP (ref 0–0.9)
MONOCYTES NFR BLD AUTO: 3.7 % — SIGNIFICANT CHANGE UP (ref 2–14)
NEUTROPHILS # BLD AUTO: 8.22 K/UL — HIGH (ref 1.8–7.4)
NEUTROPHILS NFR BLD AUTO: 85.1 % — HIGH (ref 43–77)
NRBC # BLD: 0 /100 WBCS — SIGNIFICANT CHANGE UP (ref 0–0)
PHOSPHATE SERPL-MCNC: 3 MG/DL — SIGNIFICANT CHANGE UP (ref 2.5–4.5)
PLATELET # BLD AUTO: 362 K/UL — SIGNIFICANT CHANGE UP (ref 150–400)
POTASSIUM SERPL-MCNC: 3.4 MMOL/L — LOW (ref 3.5–5.3)
POTASSIUM SERPL-SCNC: 3.4 MMOL/L — LOW (ref 3.5–5.3)
PROT SERPL-MCNC: 6.8 G/DL — SIGNIFICANT CHANGE UP (ref 6–8.3)
RBC # BLD: 4.97 M/UL — SIGNIFICANT CHANGE UP (ref 4.2–5.8)
RBC # FLD: 14.4 % — SIGNIFICANT CHANGE UP (ref 10.3–14.5)
SARS-COV-2 RNA SPEC QL NAA+PROBE: DETECTED
SODIUM SERPL-SCNC: 139 MMOL/L — SIGNIFICANT CHANGE UP (ref 135–145)
WBC # BLD: 9.66 K/UL — SIGNIFICANT CHANGE UP (ref 3.8–10.5)
WBC # FLD AUTO: 9.66 K/UL — SIGNIFICANT CHANGE UP (ref 3.8–10.5)

## 2020-03-22 PROCEDURE — 99233 SBSQ HOSP IP/OBS HIGH 50: CPT

## 2020-03-22 RX ORDER — CHLORHEXIDINE GLUCONATE 213 G/1000ML
1 SOLUTION TOPICAL
Refills: 0 | Status: DISCONTINUED | OUTPATIENT
Start: 2020-03-22 | End: 2020-03-22

## 2020-03-22 RX ORDER — HYDROXYCHLOROQUINE SULFATE 200 MG
400 TABLET ORAL EVERY 12 HOURS
Refills: 0 | Status: DISCONTINUED | OUTPATIENT
Start: 2020-03-22 | End: 2020-03-22

## 2020-03-22 RX ORDER — ASCORBIC ACID 60 MG
1500 TABLET,CHEWABLE ORAL
Refills: 0 | Status: DISCONTINUED | OUTPATIENT
Start: 2020-03-22 | End: 2020-03-22

## 2020-03-22 RX ORDER — THIAMINE MONONITRATE (VIT B1) 100 MG
200 TABLET ORAL
Refills: 0 | Status: DISCONTINUED | OUTPATIENT
Start: 2020-03-22 | End: 2020-03-22

## 2020-03-22 RX ORDER — POTASSIUM CHLORIDE 20 MEQ
40 PACKET (EA) ORAL ONCE
Refills: 0 | Status: COMPLETED | OUTPATIENT
Start: 2020-03-22 | End: 2020-03-22

## 2020-03-22 RX ORDER — HYDROXYCHLOROQUINE SULFATE 200 MG
200 TABLET ORAL
Refills: 0 | Status: DISCONTINUED | OUTPATIENT
Start: 2020-03-22 | End: 2020-03-24

## 2020-03-22 RX ADMIN — Medication 400 MILLIGRAM(S): at 06:05

## 2020-03-22 RX ADMIN — Medication 200 MILLIGRAM(S): at 01:15

## 2020-03-22 RX ADMIN — Medication 200 MILLIGRAM(S): at 17:40

## 2020-03-22 RX ADMIN — Medication 25 MILLIGRAM(S): at 06:01

## 2020-03-22 RX ADMIN — Medication 102 MILLIGRAM(S): at 05:55

## 2020-03-22 RX ADMIN — ATORVASTATIN CALCIUM 10 MILLIGRAM(S): 80 TABLET, FILM COATED ORAL at 01:15

## 2020-03-22 RX ADMIN — ENOXAPARIN SODIUM 40 MILLIGRAM(S): 100 INJECTION SUBCUTANEOUS at 22:58

## 2020-03-22 RX ADMIN — TAMSULOSIN HYDROCHLORIDE 0.8 MILLIGRAM(S): 0.4 CAPSULE ORAL at 22:59

## 2020-03-22 RX ADMIN — Medication 5 MILLIGRAM(S): at 01:14

## 2020-03-22 RX ADMIN — Medication 200 MILLIGRAM(S): at 22:59

## 2020-03-22 RX ADMIN — Medication 153 MILLIGRAM(S): at 11:31

## 2020-03-22 RX ADMIN — Medication 1000 MILLIGRAM(S): at 01:11

## 2020-03-22 RX ADMIN — ENOXAPARIN SODIUM 40 MILLIGRAM(S): 100 INJECTION SUBCUTANEOUS at 01:14

## 2020-03-22 RX ADMIN — ATORVASTATIN CALCIUM 10 MILLIGRAM(S): 80 TABLET, FILM COATED ORAL at 22:59

## 2020-03-22 RX ADMIN — Medication 5 MILLIGRAM(S): at 22:59

## 2020-03-22 RX ADMIN — Medication 40 MILLIEQUIVALENT(S): at 11:30

## 2020-03-22 RX ADMIN — Medication 250 MILLIGRAM(S): at 06:01

## 2020-03-22 RX ADMIN — CEFTRIAXONE 100 MILLIGRAM(S): 500 INJECTION, POWDER, FOR SOLUTION INTRAMUSCULAR; INTRAVENOUS at 01:16

## 2020-03-22 RX ADMIN — AMLODIPINE BESYLATE 10 MILLIGRAM(S): 2.5 TABLET ORAL at 06:02

## 2020-03-22 RX ADMIN — TAMSULOSIN HYDROCHLORIDE 0.8 MILLIGRAM(S): 0.4 CAPSULE ORAL at 01:13

## 2020-03-22 RX ADMIN — AZITHROMYCIN 255 MILLIGRAM(S): 500 TABLET, FILM COATED ORAL at 01:16

## 2020-03-22 RX ADMIN — Medication 153 MILLIGRAM(S): at 05:55

## 2020-03-22 RX ADMIN — Medication 1000 MILLIGRAM(S): at 02:25

## 2020-03-22 RX ADMIN — Medication 250 MILLIGRAM(S): at 17:40

## 2020-03-22 NOTE — PROVIDER CONTACT NOTE (OTHER) - SITUATION
Patient sats 88% on 3 liters nasal canula. Patient has dyspnea while laying in bed and with movement.

## 2020-03-22 NOTE — PROGRESS NOTE ADULT - SUBJECTIVE AND OBJECTIVE BOX
Date/Time Patient Seen:  		  Referring MD:   Data Reviewed	       Patient is a 59y old  Male who presents with a chief complaint of hypoxic respiratory failure; r/o COVID-19; bilateral pneumonia (22 Mar 2020 03:51)      Subjective/HPI     PAST MEDICAL & SURGICAL HISTORY:  Gastric ulcer  Dyspnea on exertion  Cardiomegaly  HLD (hyperlipidemia)  Type 2 diabetes mellitus  BPH (benign prostatic hyperplasia)  HTN (hypertension)  History of endoscopy        Medication list         MEDICATIONS  (STANDING):  amLODIPine   Tablet 10 milliGRAM(s) Oral daily  ascorbic acid IVPB 1500 milliGRAM(s) IV Intermittent <User Schedule>  atorvastatin 10 milliGRAM(s) Oral at bedtime  azithromycin  IVPB 500 milliGRAM(s) IV Intermittent every 24 hours  cefTRIAXone   IVPB 1000 milliGRAM(s) IV Intermittent every 24 hours  chlorhexidine 4% Liquid 1 Application(s) Topical <User Schedule>  dextrose 5%. 1000 milliLiter(s) (50 mL/Hr) IV Continuous <Continuous>  dextrose 50% Injectable 12.5 Gram(s) IV Push once  dextrose 50% Injectable 25 Gram(s) IV Push once  dextrose 50% Injectable 25 Gram(s) IV Push once  enoxaparin Injectable 40 milliGRAM(s) SubCutaneous at bedtime  hydroxychloroquine 400 milliGRAM(s) Oral every 12 hours  insulin lispro (HumaLOG) corrective regimen sliding scale   SubCutaneous three times a day before meals  insulin lispro (HumaLOG) corrective regimen sliding scale   SubCutaneous at bedtime  melatonin 5 milliGRAM(s) Oral at bedtime  metoprolol succinate ER 25 milliGRAM(s) Oral daily  saccharomyces boulardii 250 milliGRAM(s) Oral two times a day  tamsulosin 0.8 milliGRAM(s) Oral at bedtime  thiamine IVPB 200 milliGRAM(s) IV Intermittent <User Schedule>    MEDICATIONS  (PRN):  acetaminophen   Tablet .. 1000 milliGRAM(s) Oral every 6 hours PRN Temp greater or equal to 38C (100.4F), Mild Pain (1 - 3)  dextrose 40% Gel 15 Gram(s) Oral once PRN Blood Glucose LESS THAN 70 milliGRAM(s)/deciliter  glucagon  Injectable 1 milliGRAM(s) IntraMuscular once PRN Glucose LESS THAN 70 milligrams/deciliter  guaiFENesin   Syrup  (Sugar-Free) 200 milliGRAM(s) Oral every 6 hours PRN Cough         Vitals log        ICU Vital Signs Last 24 Hrs  T(C): 37.2 (22 Mar 2020 04:47), Max: 38.6 (21 Mar 2020 14:08)  T(F): 98.9 (22 Mar 2020 04:47), Max: 101.4 (21 Mar 2020 14:08)  HR: 85 (22 Mar 2020 06:00) (82 - 108)  BP: 136/71 (22 Mar 2020 06:00) (116/69 - 144/72)  BP(mean): 98 (22 Mar 2020 06:00) (98 - 100)  ABP: --  ABP(mean): --  RR: 24 (22 Mar 2020 06:00) (18 - 28)  SpO2: 100% (22 Mar 2020 06:00) (88% - 100%)           Input and Output:  I&O's Detail    21 Mar 2020 07:01  -  22 Mar 2020 07:00  --------------------------------------------------------  IN:    Solution: 50 mL    Solution: 250 mL  Total IN: 300 mL    OUT:  Total OUT: 0 mL    Total NET: 300 mL          Lab Data                        12.3   8.97  )-----------( 299      ( 21 Mar 2020 08:38 )             37.5     03-21    136  |  102  |  9   ----------------------------<  102<H>  3.6   |  26  |  0.95    Ca    8.2<L>      21 Mar 2020 08:38    TPro  6.7  /  Alb  2.7<L>  /  TBili  0.4  /  DBili  x   /  AST  54<H>  /  ALT  47  /  AlkPhos  47  03-21      CARDIAC MARKERS ( 21 Mar 2020 08:38 )  x     / x     / 162 U/L / x     / x      CARDIAC MARKERS ( 21 Mar 2020 01:11 )  <.015 ng/mL / x     / x     / x     / x            Review of Systems	      Objective     Physical Examination  heart s1s2  lung dec BS  obese  on NRB      Pertinent Lab findings & Imaging      Renay:  NO   Adequate UO     I&O's Detail    21 Mar 2020 07:01  -  22 Mar 2020 07:00  --------------------------------------------------------  IN:    Solution: 50 mL    Solution: 250 mL  Total IN: 300 mL    OUT:  Total OUT: 0 mL    Total NET: 300 mL               Discussed with:     Cultures:	        Radiology

## 2020-03-22 NOTE — DIETITIAN INITIAL EVALUATION ADULT. - ADD RECOMMEND
1) Continue with current Consistent CHO, DASH/TLC diet, 2) Monitor pt's PO intake, if PO intake is inadequate recommend oral nutritional supplement of Glucerna BID to provide additional kcal and protein, 3) Encouraged adequate PO intake with focus on protein, 4) RD to remain available for further nutrition intervention as needed, 5) Monitor pt's PO intake, weight, skin, edema, GI distress

## 2020-03-22 NOTE — PROGRESS NOTE ADULT - PROBLEM SELECTOR PLAN 1
transferred to ICU for worsening hypoxemia  testing for covid in progress - isol precs - o2 support - at risk for intubation  on abx and plaquenil - no proven role or efficacy - however - acceptable in critically ill -   I and O  monitor VS and HD and Sat  needs to use I alexis as tolerated  keep sat > 90 pct  pulse ox and cardiac monitoring  replete lytes  management of comorbidities  prognosis guarded  ct chest c/w Covid illness

## 2020-03-22 NOTE — DIETITIAN INITIAL EVALUATION ADULT. - OTHER INFO
As per chart pt is a 59 year old male with a PMH of HTN, DM2, BPH presented to the ED 3/20 with complaints of SOB. Admitted for acute hypoxic respiratory failure. Patient now with worsening hypoxia requiring NRB.    Pt on airborne precaution for R/O COVID-19. Attempted to call pt, no response.    Pt is currently on a PO diet of Consistent CHO, DASH/TLC diet. Pt's admission weight per chart 254lbs. As per chart pt is a 59 year old male with a PMH of HTN, DM2, BPH presented to the ED 3/20 with complaints of SOB. Admitted for acute hypoxic respiratory failure. Patient now with worsening hypoxia requiring NRB.    Pt on airborne precaution for R/O COVID-19. Attempted to call pt, no response.    Pt is currently on a PO diet of Consistent CHO, DASH/TLC diet. Pt's admission weight per chart 254lbs. No GI distress noted at this time, +BM 3/21.     No edema or pressure injuries noted at this time.

## 2020-03-22 NOTE — PROVIDER CONTACT NOTE (EICU) - RECOMMENDATIONS
monitor in ICU  start hydroxychlorquine, Vit c, thiamine  o2 to maintain sat  low threshold for intubation

## 2020-03-22 NOTE — PROGRESS NOTE ADULT - SUBJECTIVE AND OBJECTIVE BOX
Patient is a 59y old  Male who presents with a chief complaint of hypoxic respiratory failure; r/o COVID-19; bilateral pneumonia (22 Mar 2020 07:20)    24 hour events: afebrile  denies SOB, cough     REVIEW OF SYSTEMS  Constitutional: No fever, chills, fatigue  Neuro: No headache, numbness, weakness  Resp: No cough, wheezing, shortness of breath  CVS: No chest pain, palpitations, leg swelling  GI: No abdominal pain, nausea, vomiting, diarrhea   : No dysuria, frequency, incontinence  Skin: No itching, burning, rashes, or lesions   Msk: No joint pain or swelling  Psych: No depression, anxiety, mood swings  Heme: No bleeding    T(F): 98.5 (03-22-20 @ 08:16), Max: 100.9 (03-22-20 @ 00:53)  HR: 96 (03-22-20 @ 10:00) (82 - 108)  BP: 147/76 (03-22-20 @ 10:00) (116/69 - 147/76)  RR: 27 (03-22-20 @ 10:00) (18 - 31)  SpO2: 93% (03-22-20 @ 10:00) (88% - 100%)    CAPILLARY BLOOD GLUCOSE  POCT Blood Glucose.: 146 mg/dL (22 Mar 2020 11:26)  POCT Blood Glucose.: 112 mg/dL (22 Mar 2020 09:02)  POCT Blood Glucose.: 128 mg/dL (22 Mar 2020 00:57)  POCT Blood Glucose.: 118 mg/dL (21 Mar 2020 17:30)    I&O's Summary    03-21 @ 07:01  -  03-22 @ 07:00  --------------------------------------------------------  IN: 300 mL / OUT: 0 mL / NET: 300 mL    PHYSICAL EXAM  General: NAD  CNS: AAOx4, no focal deficits   HEENT: pupils reactive, moist mucosa   Resp: bibasilar crackles, no wheeze   CVS: S1S2, regular   Abd: soft, NT, +BS  Ext: no edema   Skin: warm, not mottled     MEDICATIONS  azithromycin  IVPB IV Intermittent  cefTRIAXone   IVPB IV Intermittent  hydroxychloroquine Oral  amLODIPine   Tablet Oral  metoprolol succinate ER Oral  tamsulosin Oral  atorvastatin Oral  dextrose 40% Gel Oral PRN  dextrose 50% Injectable IV Push  dextrose 50% Injectable IV Push  dextrose 50% Injectable IV Push  glucagon  Injectable IntraMuscular PRN  insulin lispro (HumaLOG) corrective regimen sliding scale SubCutaneous  insulin lispro (HumaLOG) corrective regimen sliding scale SubCutaneous  guaiFENesin   Syrup  (Sugar-Free) Oral PRN  acetaminophen   Tablet .. Oral PRN  melatonin Oral  enoxaparin Injectable SubCutaneous  dextrose 5%. IV Continuous  saccharomyces boulardii Oral                        12.4   9.66  )-----------( 362      ( 22 Mar 2020 09:28 )             37.9     03-22    139  |  101  |  8   ----------------------------<  111<H>  3.4<L>   |  30  |  0.96    Ca    8.4<L>      22 Mar 2020 09:28  Phos  3.0     03-22  Mg     2.1     03-22    TPro  6.8  /  Alb  2.6<L>  /  TBili  0.4  /  DBili  x   /  AST  65<H>  /  ALT  58  /  AlkPhos  56  03-22    CARDIAC MARKERS ( 21 Mar 2020 08:38 )  x     / x     / 162 U/L / x     / x      CARDIAC MARKERS ( 21 Mar 2020 01:11 )  <.015 ng/mL / x     / x     / x     / x        PT/INR - ( 21 Mar 2020 08:38 )   PT: 13.9 sec;   INR: 1.23 ratio       PTT - ( 21 Mar 2020 08:38 )  PTT:30.0 sec    .Blood Blood-Peripheral   No growth to date. -- 03-21 @ 10:51    CENTRAL LINE: N        JACOBSON: N                       A-LINE: N                       GLOBAL ISSUE/BEST PRACTICE  Analgesia: NA  Sedation: NA  CAM-ICU: neg  HOB elevation: yes  Stress ulcer prophylaxis: NA  VTE prophylaxis: Y  Glycemic control: Y  Nutrition: Y    CODE STATUS: full  GOC discussion: Y

## 2020-03-22 NOTE — DIETITIAN INITIAL EVALUATION ADULT. - PROBLEM SELECTOR PLAN 5
hold home metformin  start low dose insulin corrective scale  monitor blood glucose, hypoglycemia protocol  check glycosylated hemoglobin level in AM

## 2020-03-22 NOTE — CONSULT NOTE ADULT - SUBJECTIVE AND OBJECTIVE BOX
Patient is a 59y old  Male who presents with a chief complaint of hypoxic respiratory failure; r/o COVID-19; bilateral pneumonia (21 Mar 2020 10:35)    HPI:  60 y/o M with PMHx of HTN, DM2, BPH presented to the ED 3/20 with complaints of SOB. Known recent travel ~1 week ago to [place] ([place] and [place]) for which he took chloroquine for trip. After return developed progressive SOB. Presented to urgent care where dx with bilateral PNA and sent to ED. CT chest with suspicious for COVID -19.     Tonight patient noted to be progressively hypoxic with increased Oxygen requirements. Initially on 2L NC, patient remained hypoxic and oxygen increased to venti-mask and currently on 100% NRB. Patient states breathing has improved though requiring NRB at this time to maintain O2 sats.    Allergies: No Known Allergies    PAST MEDICAL & SURGICAL HISTORY:  Gastric ulcer  Dyspnea on exertion  Cardiomegaly  HLD (hyperlipidemia)  Type 2 diabetes mellitus  BPH (benign prostatic hyperplasia)  HTN (hypertension)  History of endoscopy    FAMILY HISTORY:  FH: CAD (coronary artery disease): sister (with hx of coronary stent) and father (with history of CABG)  Family history of CABG: father    SOCIAL HISTORY:    Home Medications:    Review of Systems:  ROS as noted above, unable to obtain full ROS with dyspnea on exertion    T(F): 99.3 (03-22-20 @ 02:25), Max: 101.4 (03-21-20 @ 14:08)  HR: 96 (03-22-20 @ 02:25) (87 - 108)  BP: 121/72 (03-22-20 @ 02:25) (116/69 - 135/77)  RR: 20 (03-22-20 @ 02:25) (18 - 20)  SpO2: 98% (03-22-20 @ 03:40)  Wt(kg): --    CAPILLARY BLOOD GLUCOSE      POCT Blood Glucose.: 128 mg/dL (22 Mar 2020 00:57)    I&O's Summary      Physical Exam:     Gen: critically ill apearing  Neuro: awake and alert, follows commands  CVS: RRR  Resp: Increased WOB, coarse breath sounds  Ext: no edema    Meds:  azithromycin  IVPB 500 milliGRAM(s) IV Intermittent every 24 hours  cefTRIAXone   IVPB 1000 milliGRAM(s) IV Intermittent every 24 hours    amLODIPine   Tablet 10 milliGRAM(s) Oral daily  metoprolol succinate ER 25 milliGRAM(s) Oral daily  tamsulosin 0.8 milliGRAM(s) Oral at bedtime     atorvastatin 10 milliGRAM(s) Oral at bedtime  dextrose 40% Gel 15 Gram(s) Oral once PRN  dextrose 50% Injectable 12.5 Gram(s) IV Push once  dextrose 50% Injectable 25 Gram(s) IV Push once  dextrose 50% Injectable 25 Gram(s) IV Push once  glucagon  Injectable 1 milliGRAM(s) IntraMuscular once PRN  insulin lispro (HumaLOG) corrective regimen sliding scale   SubCutaneous three times a day before meals  insulin lispro (HumaLOG) corrective regimen sliding scale   SubCutaneous at bedtime     guaiFENesin   Syrup  (Sugar-Free) 200 milliGRAM(s) Oral every 6 hours PRN     acetaminophen   Tablet .. 1000 milliGRAM(s) Oral every 6 hours PRN  melatonin 5 milliGRAM(s) Oral at bedtime        enoxaparin Injectable 40 milliGRAM(s) SubCutaneous at bedtime           dextrose 5%. 1000 milliLiter(s) IV Continuous <Continuous>           saccharomyces boulardii 250 milliGRAM(s) Oral two times a day                           12.3   8.97  )-----------( 299      ( 21 Mar 2020 08:38 )             37.5       03-21    136  |  102  |  9   ----------------------------<  102<H>  3.6   |  26  |  0.95    Ca    8.2<L>      21 Mar 2020 08:38    TPro  6.7  /  Alb  2.7<L>  /  TBili  0.4  /  DBili  x   /  AST  54<H>  /  ALT  47  /  AlkPhos  47  03-21      CARDIAC MARKERS ( 21 Mar 2020 08:38 )  x     / x     / 162 U/L / x     / x      CARDIAC MARKERS ( 21 Mar 2020 01:11 )  <.015 ng/mL / x     / x     / x     / x          PT/INR - ( 21 Mar 2020 08:38 )   PT: 13.9 sec;   INR: 1.23 ratio         PTT - ( 21 Mar 2020 08:38 )  PTT:30.0 sec            Radiology:   EXAM:  CT CHEST                            PROCEDURE DATE:  03/21/2020          INTERPRETATION:  Exam: CT Chest Without Contrast   Exam date and time: 3/21/2020 4:27 AM   Age: 59 years old   Clinical indication: Shortness of breath     TECHNIQUE:   Imaging protocol: Computed tomography of the chest without contrast.     COMPARISON:   DX XR CHEST PA AND LATERAL 3/21/2020 12:26 AM     FINDINGS:     Lungs: Central tracheobronchial tree is grossly patent. No endobronchial lesions. Multifocal patchyairspace disease with associated groundglass opacities throughout the lung fields compatible with a multifocal pneumonia with consideration for viral pneumonia including SARS-2-CoV/COVID-19. No pleural effusion.  Pleural space: Unremarkable. No pneumothorax. No pleural effusion.   Heart: Mild cardiomegaly.   Aorta: No aortic aneurysm.   Lymph nodes: Small reactive mediastinal lymph nodes.   Liver: Diffuse hepatic steatosis.   Kidneys and ureters: Hyperdense probable hemorrhagic cyst left kidney. This measures 2 cm.   Bones/joints: Degenerative changes. No acute fracture.   Soft tissues: Unremarkable.     IMPRESSION:     1. Multifocal patchy airspace disease with associated groundglass opacities throughout the lung fields compatible with a multifocal pneumonia with consideration for viral pneumonia including SARS-2-CoV/COVID-19.     2. Hepatic steatosis.   3. Partially imaged probable hemorrhagic cyst left kidney.    Addendum created on 3/21/2020 5:02:13 AM EDT     Findings discussed with Dr. Mendez  by Boris Crisostomo MD  at 5:01 a.m. 03/21/2020                  DOV TURNER M.D., ATTENDING RADIOLOGIST  This document has been electronically signed. Mar 21 2020  9:17AM                  Assessment/Plan:      -Acute hypoxic respiratory failure. Now requiring 100% NRB to maintain adequate O2 sat. High risk intubation  -CT scan suspicious for COVID 19. Start Chloroquine. Thimaine and Folate Patient is a 59y old  Male who presents with a chief complaint of hypoxic respiratory failure; r/o COVID-19; bilateral pneumonia (21 Mar 2020 10:35)    HPI:  58 y/o M with PMHx of HTN, DM2, BPH presented to the ED 3/20 with complaints of SOB. Known recent travel ~1 week ago to [place] ([place]) for which he took chloroquine for trip. After return developed progressive SOB. Presented to urgent care where dx with bilateral PNA and sent to ED. CT chest with suspicious for COVID -19.     Tonight patient noted to be progressively hypoxic with increased Oxygen requirements. Initially on 2L NC, patient remained hypoxic and oxygen increased to venti-mask and currently on 100% NRB. Patient states breathing has improved though requiring NRB at this time to maintain O2 sats.    Allergies: No Known Allergies    PAST MEDICAL & SURGICAL HISTORY:  Gastric ulcer  Dyspnea on exertion  Cardiomegaly  HLD (hyperlipidemia)  Type 2 diabetes mellitus  BPH (benign prostatic hyperplasia)  HTN (hypertension)  History of endoscopy    FAMILY HISTORY:  FH: CAD (coronary artery disease): sister (with hx of coronary stent) and father (with history of CABG)  Family history of CABG: father    SOCIAL HISTORY:    Home Medications:    Review of Systems:  ROS as noted above, unable to obtain full ROS with dyspnea on exertion    T(F): 99.3 (03-22-20 @ 02:25), Max: 101.4 (03-21-20 @ 14:08)  HR: 96 (03-22-20 @ 02:25) (87 - 108)  BP: 121/72 (03-22-20 @ 02:25) (116/69 - 135/77)  RR: 20 (03-22-20 @ 02:25) (18 - 20)  SpO2: 98% (03-22-20 @ 03:40)  Wt(kg): --    CAPILLARY BLOOD GLUCOSE      POCT Blood Glucose.: 128 mg/dL (22 Mar 2020 00:57)    I&O's Summary      Physical Exam:     Gen: critically ill apearing  Neuro: awake and alert, follows commands  CVS: RRR  Resp: Increased WOB, coarse breath sounds  Ext: no edema    Meds:  azithromycin  IVPB 500 milliGRAM(s) IV Intermittent every 24 hours  cefTRIAXone   IVPB 1000 milliGRAM(s) IV Intermittent every 24 hours    amLODIPine   Tablet 10 milliGRAM(s) Oral daily  metoprolol succinate ER 25 milliGRAM(s) Oral daily  tamsulosin 0.8 milliGRAM(s) Oral at bedtime     atorvastatin 10 milliGRAM(s) Oral at bedtime  dextrose 40% Gel 15 Gram(s) Oral once PRN  dextrose 50% Injectable 12.5 Gram(s) IV Push once  dextrose 50% Injectable 25 Gram(s) IV Push once  dextrose 50% Injectable 25 Gram(s) IV Push once  glucagon  Injectable 1 milliGRAM(s) IntraMuscular once PRN  insulin lispro (HumaLOG) corrective regimen sliding scale   SubCutaneous three times a day before meals  insulin lispro (HumaLOG) corrective regimen sliding scale   SubCutaneous at bedtime     guaiFENesin   Syrup  (Sugar-Free) 200 milliGRAM(s) Oral every 6 hours PRN     acetaminophen   Tablet .. 1000 milliGRAM(s) Oral every 6 hours PRN  melatonin 5 milliGRAM(s) Oral at bedtime        enoxaparin Injectable 40 milliGRAM(s) SubCutaneous at bedtime           dextrose 5%. 1000 milliLiter(s) IV Continuous <Continuous>           saccharomyces boulardii 250 milliGRAM(s) Oral two times a day                           12.3   8.97  )-----------( 299      ( 21 Mar 2020 08:38 )             37.5       03-21    136  |  102  |  9   ----------------------------<  102<H>  3.6   |  26  |  0.95    Ca    8.2<L>      21 Mar 2020 08:38    TPro  6.7  /  Alb  2.7<L>  /  TBili  0.4  /  DBili  x   /  AST  54<H>  /  ALT  47  /  AlkPhos  47  03-21      CARDIAC MARKERS ( 21 Mar 2020 08:38 )  x     / x     / 162 U/L / x     / x      CARDIAC MARKERS ( 21 Mar 2020 01:11 )  <.015 ng/mL / x     / x     / x     / x          PT/INR - ( 21 Mar 2020 08:38 )   PT: 13.9 sec;   INR: 1.23 ratio         PTT - ( 21 Mar 2020 08:38 )  PTT:30.0 sec            Radiology:   EXAM:  CT CHEST                            PROCEDURE DATE:  03/21/2020          INTERPRETATION:  Exam: CT Chest Without Contrast   Exam date and time: 3/21/2020 4:27 AM   Age: 59 years old   Clinical indication: Shortness of breath     TECHNIQUE:   Imaging protocol: Computed tomography of the chest without contrast.     COMPARISON:   DX XR CHEST PA AND LATERAL 3/21/2020 12:26 AM     FINDINGS:     Lungs: Central tracheobronchial tree is grossly patent. No endobronchial lesions. Multifocal patchyairspace disease with associated groundglass opacities throughout the lung fields compatible with a multifocal pneumonia with consideration for viral pneumonia including SARS-2-CoV/COVID-19. No pleural effusion.  Pleural space: Unremarkable. No pneumothorax. No pleural effusion.   Heart: Mild cardiomegaly.   Aorta: No aortic aneurysm.   Lymph nodes: Small reactive mediastinal lymph nodes.   Liver: Diffuse hepatic steatosis.   Kidneys and ureters: Hyperdense probable hemorrhagic cyst left kidney. This measures 2 cm.   Bones/joints: Degenerative changes. No acute fracture.   Soft tissues: Unremarkable.     IMPRESSION:     1. Multifocal patchy airspace disease with associated groundglass opacities throughout the lung fields compatible with a multifocal pneumonia with consideration for viral pneumonia including SARS-2-CoV/COVID-19.     2. Hepatic steatosis.   3. Partially imaged probable hemorrhagic cyst left kidney.    Addendum created on 3/21/2020 5:02:13 AM EDT     Findings discussed with Dr. Mendez  by Boris Crisostomo MD  at 5:01 a.m. 03/21/2020                  DOV TURNER M.D., ATTENDING RADIOLOGIST  This document has been electronically signed. Mar 21 2020  9:17AM                  Assessment/Plan:  58 y/o M with PMHx of HTN, DM2, BPH presented to the ED 3/20 with complaints of SOB. Admitted for acute hypoxic respiratory failure. Patient now with worsening hypoxia requiring NRB    -Acute hypoxic respiratory failure. Progressive Hypoxia, placed on 100% NRB to maintain adequate O2 sat. High risk intubation  -CT scan suspicious for COVID 19. Start Chloroquine, Thiamine, and Vitamin C. Empirically covered for CAP w/ Azithromycin and Vitamin C. Additional Guafenisin PRN. Follow up cultures and COVID testing. Follow inflammatory markers  -Make NPO at this time  -DVT PPX: Lovenox    Code Status; Full  Discussed w/ E-ICU. Admit to ICU at this time for acute hypoxic respiratory failure    Critical Care Time: 40 minutes including time spent reviewing chart, ordering tests/labs, and discussing with interdisciplinary team. Not including time spent performing procedures. Patient is a 59y old  Male who presents with a chief complaint of hypoxic respiratory failure; r/o COVID-19; bilateral pneumonia (21 Mar 2020 10:35)    HPI:  60 y/o M with PMHx of HTN, DM2, BPH presented to the ED 3/20 with complaints of SOB. Known recent travel ~1 week ago to [place] ([facility] [place]) for which he took chloroquine for trip. After return developed progressive SOB. Presented to urgent care where dx with bilateral PNA and sent to ED. CT chest with suspicious for COVID -19. Patient states took Chloroquine during the two weeks he was in [place] from 1st-14th and has taken it twice since he has been home, last dose Wednesday 18th.    Tonight patient noted to be progressively hypoxic with increased Oxygen requirements. Initially on 2L NC, patient remained hypoxic and oxygen increased to venti-mask and currently on 100% NRB. Patient states breathing has improved though requiring NRB at this time to maintain O2 sats.    Allergies: No Known Allergies    PAST MEDICAL & SURGICAL HISTORY:  Gastric ulcer  Dyspnea on exertion  Cardiomegaly  HLD (hyperlipidemia)  Type 2 diabetes mellitus  BPH (benign prostatic hyperplasia)  HTN (hypertension)  History of endoscopy    FAMILY HISTORY:  FH: CAD (coronary artery disease): sister (with hx of coronary stent) and father (with history of CABG)  Family history of CABG: father    SOCIAL HISTORY:    Home Medications:    Review of Systems:  ROS as noted above, unable to obtain full ROS with dyspnea on exertion    T(F): 99.3 (03-22-20 @ 02:25), Max: 101.4 (03-21-20 @ 14:08)  HR: 96 (03-22-20 @ 02:25) (87 - 108)  BP: 121/72 (03-22-20 @ 02:25) (116/69 - 135/77)  RR: 20 (03-22-20 @ 02:25) (18 - 20)  SpO2: 98% (03-22-20 @ 03:40)  Wt(kg): --    CAPILLARY BLOOD GLUCOSE      POCT Blood Glucose.: 128 mg/dL (22 Mar 2020 00:57)    I&O's Summary      Physical Exam:     Gen: critically ill apearing  Neuro: awake and alert, follows commands  CVS: RRR  Resp: Increased WOB, coarse breath sounds  Ext: no edema    Meds:  azithromycin  IVPB 500 milliGRAM(s) IV Intermittent every 24 hours  cefTRIAXone   IVPB 1000 milliGRAM(s) IV Intermittent every 24 hours    amLODIPine   Tablet 10 milliGRAM(s) Oral daily  metoprolol succinate ER 25 milliGRAM(s) Oral daily  tamsulosin 0.8 milliGRAM(s) Oral at bedtime     atorvastatin 10 milliGRAM(s) Oral at bedtime  dextrose 40% Gel 15 Gram(s) Oral once PRN  dextrose 50% Injectable 12.5 Gram(s) IV Push once  dextrose 50% Injectable 25 Gram(s) IV Push once  dextrose 50% Injectable 25 Gram(s) IV Push once  glucagon  Injectable 1 milliGRAM(s) IntraMuscular once PRN  insulin lispro (HumaLOG) corrective regimen sliding scale   SubCutaneous three times a day before meals  insulin lispro (HumaLOG) corrective regimen sliding scale   SubCutaneous at bedtime     guaiFENesin   Syrup  (Sugar-Free) 200 milliGRAM(s) Oral every 6 hours PRN     acetaminophen   Tablet .. 1000 milliGRAM(s) Oral every 6 hours PRN  melatonin 5 milliGRAM(s) Oral at bedtime        enoxaparin Injectable 40 milliGRAM(s) SubCutaneous at bedtime           dextrose 5%. 1000 milliLiter(s) IV Continuous <Continuous>           saccharomyces boulardii 250 milliGRAM(s) Oral two times a day                           12.3   8.97  )-----------( 299      ( 21 Mar 2020 08:38 )             37.5       03-21    136  |  102  |  9   ----------------------------<  102<H>  3.6   |  26  |  0.95    Ca    8.2<L>      21 Mar 2020 08:38    TPro  6.7  /  Alb  2.7<L>  /  TBili  0.4  /  DBili  x   /  AST  54<H>  /  ALT  47  /  AlkPhos  47  03-21      CARDIAC MARKERS ( 21 Mar 2020 08:38 )  x     / x     / 162 U/L / x     / x      CARDIAC MARKERS ( 21 Mar 2020 01:11 )  <.015 ng/mL / x     / x     / x     / x          PT/INR - ( 21 Mar 2020 08:38 )   PT: 13.9 sec;   INR: 1.23 ratio         PTT - ( 21 Mar 2020 08:38 )  PTT:30.0 sec            Radiology:   EXAM:  CT CHEST                            PROCEDURE DATE:  03/21/2020          INTERPRETATION:  Exam: CT Chest Without Contrast   Exam date and time: 3/21/2020 4:27 AM   Age: 59 years old   Clinical indication: Shortness of breath     TECHNIQUE:   Imaging protocol: Computed tomography of the chest without contrast.     COMPARISON:   DX XR CHEST PA AND LATERAL 3/21/2020 12:26 AM     FINDINGS:     Lungs: Central tracheobronchial tree is grossly patent. No endobronchial lesions. Multifocal patchyairspace disease with associated groundglass opacities throughout the lung fields compatible with a multifocal pneumonia with consideration for viral pneumonia including SARS-2-CoV/COVID-19. No pleural effusion.  Pleural space: Unremarkable. No pneumothorax. No pleural effusion.   Heart: Mild cardiomegaly.   Aorta: No aortic aneurysm.   Lymph nodes: Small reactive mediastinal lymph nodes.   Liver: Diffuse hepatic steatosis.   Kidneys and ureters: Hyperdense probable hemorrhagic cyst left kidney. This measures 2 cm.   Bones/joints: Degenerative changes. No acute fracture.   Soft tissues: Unremarkable.     IMPRESSION:     1. Multifocal patchy airspace disease with associated groundglass opacities throughout the lung fields compatible with a multifocal pneumonia with consideration for viral pneumonia including SARS-2-CoV/COVID-19.     2. Hepatic steatosis.   3. Partially imaged probable hemorrhagic cyst left kidney.    Addendum created on 3/21/2020 5:02:13 AM EDT     Findings discussed with Dr. Mendez  by Boris Crisostomo MD  at 5:01 a.m. 03/21/2020                  DOV TURNER M.D., ATTENDING RADIOLOGIST  This document has been electronically signed. Mar 21 2020  9:17AM                  Assessment/Plan:  60 y/o M with PMHx of HTN, DM2, BPH presented to the ED 3/20 with complaints of SOB. Admitted for acute hypoxic respiratory failure. Patient now with worsening hypoxia requiring NRB    -Acute hypoxic respiratory failure. Progressive Hypoxia, placed on 100% NRB to maintain adequate O2 sat. High risk intubation  -CT scan suspicious for COVID 19. Will resume chloroquine, start Thiamine, and Vitamin C. Empirically covered for CAP w/ Azithromycin and Vitamin C. Additional Guafenisin PRN. Follow up cultures and COVID testing. Follow inflammatory markers  -Make NPO at this time  -DVT PPX: Lovenox    Code Status; Full  Discussed w/ E-ICU. Admit to ICU at this time for acute hypoxic respiratory failure    Critical Care Time: 40 minutes including time spent reviewing chart, ordering tests/labs, and discussing with interdisciplinary team. Not including time spent performing procedures.

## 2020-03-22 NOTE — PROGRESS NOTE ADULT - ATTENDING COMMENTS
59M PMH HTN, DM2 (not on insulin), skin ca, BPH, admitted with acute hypoxic resp failure due to pneumonia from COVID-19. His symptoms started on 3/10.     Resp status is stable at this time, SpO2 low 90s on 3-4L NC   He took a few doses of Plaquenil as outpatient, will resume it here  Continue azithromycin, ceftriaxone   Wean NC O2 if he tolerates   PO diet   BP is stable, continue amlodipine, metoprolol  Continue statin   Humalog sliding scale, glucose well controlled   Renal fn ok  Hypokalemia - replete K   Continue Flomax   Lovenox for DVT ppx   OOB     Likely transfer to medicine tomorrow if remains stable 59M PMH HTN, DM2 (not on insulin), skin ca, BPH, admitted with acute hypoxic resp failure due to pneumonia from COVID-19. His symptoms started on 3/10.     Resp status is stable at this time, SpO2 low 90s on 3-4L NC   He took a few doses of Plaquenil as outpatient, will resume it here  Monitor daily CRP, ferritin, LDH, D-dimer   Continue azithromycin, ceftriaxone   Wean NC O2 if he tolerates   PO diet   BP is stable, continue amlodipine, metoprolol  Continue statin   Humalog sliding scale, glucose well controlled   Renal fn ok  Hypokalemia - replete K   Continue Flomax   Lovenox for DVT ppx   OOB     Likely transfer to medicine tomorrow if remains stable

## 2020-03-22 NOTE — PROVIDER CONTACT NOTE (EICU) - BACKGROUND
59 year old male with HTN DM2, bph who p/w flu like symptoms. While in hospital noted worsening hypoxia requiring NRB. ICU consult called for further evaluation.

## 2020-03-22 NOTE — CHART NOTE - NSCHARTNOTEFT_GEN_A_CORE
Called by RN for Pt with oxygen saturation of 88% on 3L. RN was instructed to increase NC LPM. RN reported pt sating 90-92% on 5L NC. Patient seen and examined at bedside. Patient was initially on 2L of oxygen and required increased oxygen supplementation due to increasing hypoxia throughout night. Patient showed increased work of breathing with RR of 22. Patient admitted to feeling a little short of breath, but stated he felt okay. Denies cp, palpitations, fevers, chills.          T(C): 37.4 (03-22-20 @ 02:25), Max: 38.6 (03-21-20 @ 14:08)  HR: 96 (03-22-20 @ 02:25) (87 - 108)  BP: 121/72 (03-22-20 @ 02:25) (116/69 - 135/77)  RR: 20 (03-22-20 @ 02:25) (18 - 20)  SpO2: 98% (03-22-20 @ 03:40) (88% - 98%)  Wt(kg): --    Physical :  Gen- NAD, ncat  Cardio - s+1,s+2, rrr, no murmur  Lung - diminished BS, coarse BS, no wheeze, no rhonchi, no rales   Abdomen- +BS, NT/ND, no guarding, no rebound, no masses  Ext- no edema, 2+ pulses b/l  Neuro- CN grossly intact  LABS:                        12.3   8.97  )-----------( 299      ( 21 Mar 2020 08:38 )             37.5     03-21    136  |  102  |  9   ----------------------------<  102<H>  3.6   |  26  |  0.95    Ca    8.2<L>      21 Mar 2020 08:38    TPro  6.7  /  Alb  2.7<L>  /  TBili  0.4  /  DBili  x   /  AST  54<H>  /  ALT  47  /  AlkPhos  47  03-21    PT/INR - ( 21 Mar 2020 08:38 )   PT: 13.9 sec;   INR: 1.23 ratio         PTT - ( 21 Mar 2020 08:38 )  PTT:30.0 sec      CARDIAC MARKERS ( 21 Mar 2020 08:38 )  x     / x     / 162 U/L / x     / x      CARDIAC MARKERS ( 21 Mar 2020 01:11 )  <.015 ng/mL / x     / x     / x     / x            Assessment/Plan  59yMale admitted for   1.     Dr Salguero  PGY1  [phone] Called by RN for Pt with oxygen saturation of 88% on 3L. RN was instructed to increase NC LPM. RN reported pt sating 90-92% on 5L NC. Patient seen and examined at bedside. Patient was initially on 2L of oxygen and required increased oxygen supplementation due to increasing hypoxia throughout night. Patient showed increased work of breathing with RR of 22. Patient admitted to feeling a little short of breath, but stated he felt okay. Denies cp, palpitations, fevers, chills.          T(C): 37.4 (03-22-20 @ 02:25), Max: 38.6 (03-21-20 @ 14:08)  HR: 96 (03-22-20 @ 02:25) (87 - 108)  BP: 121/72 (03-22-20 @ 02:25) (116/69 - 135/77)  RR: 20 (03-22-20 @ 02:25) (18 - 20)  SpO2: 98% (03-22-20 @ 03:40) (88% - 98%)  Wt(kg): --    Physical :  Gen- NAD, ncat  Cardio - s+1,s+2, rrr, no murmur  Lung - diminished BS, coarse BS, no wheeze, no rhonchi, no rales   Abdomen- +BS, NT/ND, no guarding, no rebound, no masses  Ext- no edema, 2+ pulses b/l  Neuro- CN grossly intact  LABS:                        12.3   8.97  )-----------( 299      ( 21 Mar 2020 08:38 )             37.5     03-21    136  |  102  |  9   ----------------------------<  102<H>  3.6   |  26  |  0.95    Ca    8.2<L>      21 Mar 2020 08:38    TPro  6.7  /  Alb  2.7<L>  /  TBili  0.4  /  DBili  x   /  AST  54<H>  /  ALT  47  /  AlkPhos  47  03-21    PT/INR - ( 21 Mar 2020 08:38 )   PT: 13.9 sec;   INR: 1.23 ratio         PTT - ( 21 Mar 2020 08:38 )  PTT:30.0 sec      CARDIAC MARKERS ( 21 Mar 2020 08:38 )  x     / x     / 162 U/L / x     / x      CARDIAC MARKERS ( 21 Mar 2020 01:11 )  <.015 ng/mL / x     / x     / x     / x            Assessment/Plan  60yo male with pmhx of of HTN, DM2, BPH admitted for acute hypoxic respiratory failure 2/2 bilateral pneumonia, rule out COVID-19. Now with increasing oxygen requirements     1. Pt was started on 40% venti mask with continue O2 sat remaining below 93%. Pt was then placed on 50% venti mask with O2sat at 91%.  2. Pt placed on non-rebreather with improvement of O2sat to 98%.  3. ICU consult placed and as pt is high risk for respiratory decompensation with NEWS2 score of 7 pt is to be transferred to the ICU  4. Further management per ICU    -s.loree  PGY1  [phone]

## 2020-03-22 NOTE — DIETITIAN INITIAL EVALUATION ADULT. - PROBLEM SELECTOR PLAN 3
Acute hypoxic respiratory failure 2/2 PNA in setting of suspected COVID19 viral illness given clinical presentation, recent travel history, labs significant for lymphopenia  Flu/RSV negative, Full RVP Panel pending  continue O2 NC, may use Venturi Mask/NRB if needed  Avoid HFNC/NIPPV/aerosolizing procedures i.e. nebulizations  Avoid NSAIDs, treat fevers with tylenol  Maintain strict isolation precautions, use proper PPE   check urine legionella/strep antigens, MRSA PCR  daily CBC with diff to follow eosinophils, lymphocytes and neutrophils, daily CK  check LDH, CRP, ferritin, procalcitonin, ESR, PT/PTT - as near the time for decompensation (on day 6 of symptoms at time of admission per pt report)  lactate, troponin were negative on admission.  NEWS2 score of 7 - high risk for decompensation  monitor respiratory status closely   Code Status: FULL

## 2020-03-23 DIAGNOSIS — J12.81 PNEUMONIA DUE TO SARS-ASSOCIATED CORONAVIRUS: ICD-10-CM

## 2020-03-23 LAB
ALBUMIN SERPL ELPH-MCNC: 2.3 G/DL — LOW (ref 3.3–5)
ALP SERPL-CCNC: 51 U/L — SIGNIFICANT CHANGE UP (ref 40–120)
ALT FLD-CCNC: 59 U/L — SIGNIFICANT CHANGE UP (ref 12–78)
ANION GAP SERPL CALC-SCNC: 8 MMOL/L — SIGNIFICANT CHANGE UP (ref 5–17)
AST SERPL-CCNC: 68 U/L — HIGH (ref 15–37)
BASOPHILS # BLD AUTO: 0.04 K/UL — SIGNIFICANT CHANGE UP (ref 0–0.2)
BASOPHILS NFR BLD AUTO: 0.5 % — SIGNIFICANT CHANGE UP (ref 0–2)
BILIRUB SERPL-MCNC: 0.4 MG/DL — SIGNIFICANT CHANGE UP (ref 0.2–1.2)
BUN SERPL-MCNC: 10 MG/DL — SIGNIFICANT CHANGE UP (ref 7–23)
CALCIUM SERPL-MCNC: 8.3 MG/DL — LOW (ref 8.5–10.1)
CHLORIDE SERPL-SCNC: 103 MMOL/L — SIGNIFICANT CHANGE UP (ref 96–108)
CO2 SERPL-SCNC: 29 MMOL/L — SIGNIFICANT CHANGE UP (ref 22–31)
CREAT SERPL-MCNC: 0.83 MG/DL — SIGNIFICANT CHANGE UP (ref 0.5–1.3)
CRP SERPL-MCNC: 13.14 MG/DL — HIGH (ref 0–0.4)
EOSINOPHIL # BLD AUTO: 0.06 K/UL — SIGNIFICANT CHANGE UP (ref 0–0.5)
EOSINOPHIL NFR BLD AUTO: 0.7 % — SIGNIFICANT CHANGE UP (ref 0–6)
ERYTHROCYTE [SEDIMENTATION RATE] IN BLOOD: 28 MM/HR — HIGH (ref 0–20)
FERRITIN SERPL-MCNC: 668 NG/ML — HIGH (ref 30–400)
GLUCOSE SERPL-MCNC: 96 MG/DL — SIGNIFICANT CHANGE UP (ref 70–99)
HCT VFR BLD CALC: 34.6 % — LOW (ref 39–50)
HGB BLD-MCNC: 11.3 G/DL — LOW (ref 13–17)
IMM GRANULOCYTES NFR BLD AUTO: 1.1 % — SIGNIFICANT CHANGE UP (ref 0–1.5)
LDH SERPL L TO P-CCNC: 480 U/L — HIGH (ref 50–242)
LYMPHOCYTES # BLD AUTO: 1.53 K/UL — SIGNIFICANT CHANGE UP (ref 1–3.3)
LYMPHOCYTES # BLD AUTO: 18.1 % — SIGNIFICANT CHANGE UP (ref 13–44)
MAGNESIUM SERPL-MCNC: 2.1 MG/DL — SIGNIFICANT CHANGE UP (ref 1.6–2.6)
MCHC RBC-ENTMCNC: 25.1 PG — LOW (ref 27–34)
MCHC RBC-ENTMCNC: 32.7 GM/DL — SIGNIFICANT CHANGE UP (ref 32–36)
MCV RBC AUTO: 76.9 FL — LOW (ref 80–100)
MONOCYTES # BLD AUTO: 0.53 K/UL — SIGNIFICANT CHANGE UP (ref 0–0.9)
MONOCYTES NFR BLD AUTO: 6.3 % — SIGNIFICANT CHANGE UP (ref 2–14)
NEUTROPHILS # BLD AUTO: 6.22 K/UL — SIGNIFICANT CHANGE UP (ref 1.8–7.4)
NEUTROPHILS NFR BLD AUTO: 73.3 % — SIGNIFICANT CHANGE UP (ref 43–77)
NRBC # BLD: 0 /100 WBCS — SIGNIFICANT CHANGE UP (ref 0–0)
PHOSPHATE SERPL-MCNC: 3 MG/DL — SIGNIFICANT CHANGE UP (ref 2.5–4.5)
PLATELET # BLD AUTO: 397 K/UL — SIGNIFICANT CHANGE UP (ref 150–400)
POTASSIUM SERPL-MCNC: 3.5 MMOL/L — SIGNIFICANT CHANGE UP (ref 3.5–5.3)
POTASSIUM SERPL-SCNC: 3.5 MMOL/L — SIGNIFICANT CHANGE UP (ref 3.5–5.3)
PROT SERPL-MCNC: 6.1 G/DL — SIGNIFICANT CHANGE UP (ref 6–8.3)
RBC # BLD: 4.5 M/UL — SIGNIFICANT CHANGE UP (ref 4.2–5.8)
RBC # FLD: 14.2 % — SIGNIFICANT CHANGE UP (ref 10.3–14.5)
SODIUM SERPL-SCNC: 140 MMOL/L — SIGNIFICANT CHANGE UP (ref 135–145)
TROPONIN I SERPL-MCNC: <.015 NG/ML — SIGNIFICANT CHANGE UP (ref 0.01–0.04)
WBC # BLD: 8.47 K/UL — SIGNIFICANT CHANGE UP (ref 3.8–10.5)
WBC # FLD AUTO: 8.47 K/UL — SIGNIFICANT CHANGE UP (ref 3.8–10.5)

## 2020-03-23 PROCEDURE — 99233 SBSQ HOSP IP/OBS HIGH 50: CPT

## 2020-03-23 RX ORDER — METOPROLOL TARTRATE 50 MG
25 TABLET ORAL
Refills: 0 | Status: DISCONTINUED | OUTPATIENT
Start: 2020-03-24 | End: 2020-03-27

## 2020-03-23 RX ADMIN — Medication 200 MILLIGRAM(S): at 18:14

## 2020-03-23 RX ADMIN — TAMSULOSIN HYDROCHLORIDE 0.8 MILLIGRAM(S): 0.4 CAPSULE ORAL at 21:26

## 2020-03-23 RX ADMIN — Medication 250 MILLIGRAM(S): at 05:43

## 2020-03-23 RX ADMIN — Medication 200 MILLIGRAM(S): at 05:43

## 2020-03-23 RX ADMIN — Medication 5 MILLIGRAM(S): at 21:26

## 2020-03-23 RX ADMIN — Medication 1: at 12:01

## 2020-03-23 RX ADMIN — Medication 25 MILLIGRAM(S): at 05:43

## 2020-03-23 RX ADMIN — Medication 250 MILLIGRAM(S): at 18:14

## 2020-03-23 RX ADMIN — AMLODIPINE BESYLATE 10 MILLIGRAM(S): 2.5 TABLET ORAL at 05:43

## 2020-03-23 RX ADMIN — ATORVASTATIN CALCIUM 10 MILLIGRAM(S): 80 TABLET, FILM COATED ORAL at 21:25

## 2020-03-23 RX ADMIN — CEFTRIAXONE 100 MILLIGRAM(S): 500 INJECTION, POWDER, FOR SOLUTION INTRAMUSCULAR; INTRAVENOUS at 21:26

## 2020-03-23 RX ADMIN — ENOXAPARIN SODIUM 40 MILLIGRAM(S): 100 INJECTION SUBCUTANEOUS at 21:25

## 2020-03-23 RX ADMIN — AZITHROMYCIN 255 MILLIGRAM(S): 500 TABLET, FILM COATED ORAL at 00:42

## 2020-03-23 RX ADMIN — CEFTRIAXONE 100 MILLIGRAM(S): 500 INJECTION, POWDER, FOR SOLUTION INTRAMUSCULAR; INTRAVENOUS at 00:42

## 2020-03-23 NOTE — PROGRESS NOTE ADULT - NSHPATTENDINGPLANDISCUSS_GEN_ALL_CORE
numerous family members over the phone, ED RN, pulm - re: tx plan, dispo plan pt, SW, CM, RN, MICU team, family over the phone - re: tx plan, disposition planning, above detailed plan

## 2020-03-23 NOTE — PROGRESS NOTE ADULT - PROBLEM SELECTOR PLAN 5
check iron studies, b12, folate  - trend h/h  - monitor for clinical blood loss - trend h/h  - monitor for clinical blood loss

## 2020-03-23 NOTE — PROGRESS NOTE ADULT - SUBJECTIVE AND OBJECTIVE BOX
Patient is a 59y old  Male who presents with a chief complaint of hypoxic respiratory failure; r/o COVID-19; bilateral pneumonia (23 Mar 2020 14:21)      FROM ADMISSION H+P:   HPI:  60 y/o M with PMHx of HTN, DM2, BPH presented to the ED complaining of fever and shortness of breath past few days. He recently returned from [place] ([facility] and [place]) approximately one week ago - took chloroquine for his trip. He developed difficulty breathing and was seen in urgent care. At urgent care, noted to be hypoxic to 88% on room air with bilateral pneumonia and referred to the ED for further evaluation. He admits to dyspnea mostly with exertion. Denies chest pain, palpitations, abdominal pain, n/v/d. States that he is otherwise feeling well. Had no sick contacts. He states that his symptoms started about 6 days PTA. No other complaints.     ----  INTERVAL HPI/OVERNIGHT EVENTS: Pt seen and evaluated at the bedside. No acute overnight events occurred. Pt reports feeling slightly better than yesterday but still reports profound weakness. Eating about 50% of trays due to reduced appetite.     ----  PAST MEDICAL & SURGICAL HISTORY:  Gastric ulcer  Dyspnea on exertion  Cardiomegaly  HLD (hyperlipidemia)  Type 2 diabetes mellitus  BPH (benign prostatic hyperplasia)  HTN (hypertension)  History of endoscopy      FAMILY HISTORY:  FH: CAD (coronary artery disease): sister (with hx of coronary stent) and father (with history of CABG)  Family history of CABG: father      Allergies    No Known Allergies    Intolerances        ----  REVIEW OF SYSTEMS:  CONSTITUTIONAL: admits fever, chills which are intermittent   CARDIOVASCULAR: denies chest pain, chest pressure, palpitations  RESPIRATORY: admits dry cough, feels dyspneic  GASTROINTESTINAL: denies nausea, vomiting, diarrhea, abdominal pain. appetite is mildly reduced   NEUROLOGICAL: denies numbness, headache, focal weakness  MUSCULOSKELETAL: denies new joint pain, muscle aches   LYMPHATICS: denies enlarged lymph nodes, extremity swelling  PSYCHIATRIC: denies recent changes in anxiety, depression  ENDOCRINOLOGIC: denies sweating, cold or heat intolerance    ----  PHYSICAL EXAM:  GENERAL: patient appears well, nontoxic, sitting up in bed without acute distress   ENMT: oropharynx clear without erythema, moist mucous membranes   LUNGS: reduced air entry b/l, coarse bibasilar breath sounds  HEART: soft S1/S2, regular rate and rhythm, no murmurs noted, no noted edema to b/l LE  GASTROINTESTINAL: abdomen is soft, nontender, nondistended, normoactive bowel sounds, no palpable masses  INTEGUMENT: good skin turgor, appropriate for ethnicity, appears well perfused, no jaundice noted  NEUROLOGIC: awake, alert, oriented x3, good muscle tone in 4 extremities, no obvious sensory deficits  HEME/LYMPH: no palpable supraclavicular nodules, no obvious ecchymosis    T(C): 36.6 (03-23-20 @ 16:53), Max: 37.8 (03-22-20 @ 20:30)  HR: 94 (03-23-20 @ 16:53) (78 - 98)  BP: 133/77 (03-23-20 @ 16:53) (109/57 - 143/82)  RR: 18 (03-23-20 @ 16:53) (18 - 37)  SpO2: 96% (03-23-20 @ 16:53) (91% - 98%)  Wt(kg): --    ----  I&O's Summary    22 Mar 2020 07:01  -  23 Mar 2020 07:00  --------------------------------------------------------  IN: 1400 mL / OUT: 1000 mL / NET: 400 mL        LABS:                        11.3   8.47  )-----------( 397      ( 23 Mar 2020 06:22 )             34.6     03-23    140  |  103  |  10  ----------------------------<  96  3.5   |  29  |  0.83    Ca    8.3<L>      23 Mar 2020 06:22  Phos  3.0     03-23  Mg     2.1     03-23    TPro  6.1  /  Alb  2.3<L>  /  TBili  0.4  /  DBili  x   /  AST  68<H>  /  ALT  59  /  AlkPhos  51  03-23        CAPILLARY BLOOD GLUCOSE      POCT Blood Glucose.: 147 mg/dL (23 Mar 2020 16:46)  POCT Blood Glucose.: 158 mg/dL (23 Mar 2020 11:57)  POCT Blood Glucose.: 109 mg/dL (23 Mar 2020 07:40)  POCT Blood Glucose.: 115 mg/dL (22 Mar 2020 22:55)      03-21 @ 10:51   No growth to date.  --  --            ----  Personally reviewed:  Vital sign trends: [  ] yes    [  ] no     [  ] n/a  Laboratory results: [  ] yes    [  ] no     [  ] n/a  Radiology results: [  ] yes    [  ] no     [  ] n/a  Culture results: [  ] yes    [  ] no     [  ] n/a  Consultant recommendations: [  ] yes    [  ] no     [  ] n/a Patient is a 59y old  Male who presents with a chief complaint of hypoxic respiratory failure; r/o COVID-19; bilateral pneumonia (23 Mar 2020 14:21)      FROM ADMISSION H+P:   HPI:  60 y/o M with PMHx of HTN, DM2, BPH presented to the ED complaining of fever and shortness of breath past few days. He recently returned from [place] ([facility] and [place]) approximately one week ago - took chloroquine for his trip. He developed difficulty breathing and was seen in urgent care. At urgent care, noted to be hypoxic to 88% on room air with bilateral pneumonia and referred to the ED for further evaluation. He admits to dyspnea mostly with exertion. Denies chest pain, palpitations, abdominal pain, n/v/d. States that he is otherwise feeling well. Had no sick contacts. He states that his symptoms started about 6 days PTA. No other complaints.     ----  INTERVAL HPI/OVERNIGHT EVENTS: Pt seen and evaluated at the bedside. No acute overnight events occurred. Pt reports feeling slightly better than yesterday but still reports profound weakness. Eating about 50% of trays due to reduced appetite.     ----  PAST MEDICAL & SURGICAL HISTORY:  Gastric ulcer  Dyspnea on exertion  Cardiomegaly  HLD (hyperlipidemia)  Type 2 diabetes mellitus  BPH (benign prostatic hyperplasia)  HTN (hypertension)  History of endoscopy      FAMILY HISTORY:  FH: CAD (coronary artery disease): sister (with hx of coronary stent) and father (with history of CABG)  Family history of CABG: father      Allergies    No Known Allergies    Intolerances        ----  REVIEW OF SYSTEMS:  CONSTITUTIONAL: admits fever, chills which are intermittent   CARDIOVASCULAR: denies chest pain, chest pressure, palpitations  RESPIRATORY: admits dry cough, feels dyspneic  GASTROINTESTINAL: denies nausea, vomiting, diarrhea, abdominal pain. appetite is mildly reduced   NEUROLOGICAL: denies numbness, headache, focal weakness  MUSCULOSKELETAL: denies new joint pain, muscle aches   LYMPHATICS: denies enlarged lymph nodes, extremity swelling  PSYCHIATRIC: denies recent changes in anxiety, depression  ENDOCRINOLOGIC: denies sweating, cold or heat intolerance    ----  PHYSICAL EXAM:  GENERAL: patient appears well, nontoxic, sitting up in bed without acute distress   ENMT: oropharynx clear without erythema, moist mucous membranes   LUNGS: reduced air entry b/l, coarse bibasilar breath sounds  HEART: soft S1/S2, regular rate and rhythm, no murmurs noted, no noted edema to b/l LE  GASTROINTESTINAL: abdomen is soft, nontender, nondistended, normoactive bowel sounds, no palpable masses  INTEGUMENT: good skin turgor, appropriate for ethnicity, appears well perfused, no jaundice noted  NEUROLOGIC: awake, alert, oriented x3, good muscle tone in 4 extremities, no obvious sensory deficits  HEME/LYMPH: no palpable supraclavicular nodules, no obvious ecchymosis    T(C): 36.6 (03-23-20 @ 16:53), Max: 37.8 (03-22-20 @ 20:30)  HR: 94 (03-23-20 @ 16:53) (78 - 98)  BP: 133/77 (03-23-20 @ 16:53) (109/57 - 143/82)  RR: 18 (03-23-20 @ 16:53) (18 - 37)  SpO2: 96% (03-23-20 @ 16:53) (91% - 98%)  Wt(kg): --    ----  I&O's Summary    22 Mar 2020 07:01  -  23 Mar 2020 07:00  --------------------------------------------------------  IN: 1400 mL / OUT: 1000 mL / NET: 400 mL        LABS:                        11.3   8.47  )-----------( 397      ( 23 Mar 2020 06:22 )             34.6     03-23    140  |  103  |  10  ----------------------------<  96  3.5   |  29  |  0.83    Ca    8.3<L>      23 Mar 2020 06:22  Phos  3.0     03-23  Mg     2.1     03-23    TPro  6.1  /  Alb  2.3<L>  /  TBili  0.4  /  DBili  x   /  AST  68<H>  /  ALT  59  /  AlkPhos  51  03-23        CAPILLARY BLOOD GLUCOSE      POCT Blood Glucose.: 147 mg/dL (23 Mar 2020 16:46)  POCT Blood Glucose.: 158 mg/dL (23 Mar 2020 11:57)  POCT Blood Glucose.: 109 mg/dL (23 Mar 2020 07:40)  POCT Blood Glucose.: 115 mg/dL (22 Mar 2020 22:55)      03-21 @ 10:51   No growth to date.  --  --

## 2020-03-23 NOTE — CONSULT NOTE ADULT - PROBLEM SELECTOR RECOMMENDATION 9
covid 19 eval in progress  isolation precs  ct chest c/w covid 19  will check biomarkers - esr, crp, ck, ferritin, ldh, tsh,   o2 support  robitussin PRN  tylenol PRN  on emp abx - role for ABX to be determined -   discussed plan of care with pt  management of comorbidities  will follow
as above  Thank you for consulting us and involving us in the management of this most interesting and challenging case. I certainly appreciate the challenges, stress and sacrifice during these difficult and challenging times.  We will follow along in the care of this patient.

## 2020-03-23 NOTE — PROGRESS NOTE ADULT - PROBLEM SELECTOR PLAN 1
likely secondary to bilateral pneumonia  admit to medicine with remote tele/cont pulse oximetry  found to be hypoxic to 88% on RA at urgent care, 91% on RA in [facility] ED  supplemental O2 as needed, monitor for increasing O2 requirements 2/2 COVID-19 viral PNA  remote tele/cont pulse oximetry  supplemental O2 as needed, monitor for increasing O2 requirements  - avoid nebs

## 2020-03-23 NOTE — PROGRESS NOTE ADULT - SUBJECTIVE AND OBJECTIVE BOX
Date/Time Patient Seen:  		  Referring MD:   Data Reviewed	       Patient is a 59y old  Male who presents with a chief complaint of hypoxic respiratory failure; r/o COVID-19; bilateral pneumonia (23 Mar 2020 10:46)      Subjective/HPI     PAST MEDICAL & SURGICAL HISTORY:  Gastric ulcer  Dyspnea on exertion  Cardiomegaly  HLD (hyperlipidemia)  Type 2 diabetes mellitus  BPH (benign prostatic hyperplasia)  HTN (hypertension)  History of endoscopy        Medication list         MEDICATIONS  (STANDING):  amLODIPine   Tablet 10 milliGRAM(s) Oral daily  atorvastatin 10 milliGRAM(s) Oral at bedtime  cefTRIAXone   IVPB 1000 milliGRAM(s) IV Intermittent every 24 hours  dextrose 5%. 1000 milliLiter(s) (50 mL/Hr) IV Continuous <Continuous>  dextrose 50% Injectable 12.5 Gram(s) IV Push once  dextrose 50% Injectable 25 Gram(s) IV Push once  dextrose 50% Injectable 25 Gram(s) IV Push once  enoxaparin Injectable 40 milliGRAM(s) SubCutaneous at bedtime  hydroxychloroquine 200 milliGRAM(s) Oral two times a day  insulin lispro (HumaLOG) corrective regimen sliding scale   SubCutaneous three times a day before meals  insulin lispro (HumaLOG) corrective regimen sliding scale   SubCutaneous at bedtime  melatonin 5 milliGRAM(s) Oral at bedtime  saccharomyces boulardii 250 milliGRAM(s) Oral two times a day  tamsulosin 0.8 milliGRAM(s) Oral at bedtime    MEDICATIONS  (PRN):  acetaminophen   Tablet .. 1000 milliGRAM(s) Oral every 6 hours PRN Temp greater or equal to 38C (100.4F), Mild Pain (1 - 3)  dextrose 40% Gel 15 Gram(s) Oral once PRN Blood Glucose LESS THAN 70 milliGRAM(s)/deciliter  glucagon  Injectable 1 milliGRAM(s) IntraMuscular once PRN Glucose LESS THAN 70 milligrams/deciliter  guaiFENesin   Syrup  (Sugar-Free) 200 milliGRAM(s) Oral every 6 hours PRN Cough         Vitals log        ICU Vital Signs Last 24 Hrs  T(C): 37.4 (23 Mar 2020 12:38), Max: 37.8 (22 Mar 2020 20:30)  T(F): 99.3 (23 Mar 2020 12:38), Max: 100 (22 Mar 2020 20:30)  HR: 94 (23 Mar 2020 12:38) (78 - 100)  BP: 143/82 (23 Mar 2020 12:38) (109/57 - 143/82)  BP(mean): 88 (23 Mar 2020 11:00) (78 - 105)  ABP: --  ABP(mean): --  RR: 20 (23 Mar 2020 12:38) (20 - 37)  SpO2: 98% (23 Mar 2020 12:38) (91% - 98%)           Input and Output:  I&O's Detail    22 Mar 2020 07:01  -  23 Mar 2020 07:00  --------------------------------------------------------  IN:    Oral Fluid: 1050 mL    Solution: 250 mL    Solution: 100 mL  Total IN: 1400 mL    OUT:    Voided: 1000 mL  Total OUT: 1000 mL    Total NET: 400 mL          Lab Data                        11.3   8.47  )-----------( 397      ( 23 Mar 2020 06:22 )             34.6     03-23    140  |  103  |  10  ----------------------------<  96  3.5   |  29  |  0.83    Ca    8.3<L>      23 Mar 2020 06:22  Phos  3.0     03-23  Mg     2.1     03-23    TPro  6.1  /  Alb  2.3<L>  /  TBili  0.4  /  DBili  x   /  AST  68<H>  /  ALT  59  /  AlkPhos  51  03-23      CARDIAC MARKERS ( 23 Mar 2020 06:22 )  <.015 ng/mL / x     / x     / x     / x            Review of Systems	      Objective     Physical Examination    heart s1s2  lung dec BS  abd soft      Pertinent Lab findings & Imaging      Renay:  NO   Adequate UO     I&O's Detail    22 Mar 2020 07:01  -  23 Mar 2020 07:00  --------------------------------------------------------  IN:    Oral Fluid: 1050 mL    Solution: 250 mL    Solution: 100 mL  Total IN: 1400 mL    OUT:    Voided: 1000 mL  Total OUT: 1000 mL    Total NET: 400 mL               Discussed with:     Cultures:	        Radiology

## 2020-03-23 NOTE — CONSULT NOTE ADULT - ASSESSMENT
58 y/o M with PMHx of HTN, DM2, BPH adm 3/21 recently returned from [place] ([place] and [place])     COVID-19  ---First week of illness generally less severe followed by critical period where patients start to improve or decompensate and require intubation (7-14 days post symptom onset)  avoid aerosolizing procedures, avoidance of steroids which are associated with worse outcomes including inhaled steroids,    ---no compelling evidence to date DO NOT recommend any specific therapies outside of established protocols or controlled trials   -would support focus on supportive care and avoid  NSAIDs for fever and pain management.  avoid excessive blood draws and lab testing  -daily CBC w diff to follow eos, lymphocytes and neutrophils,   LDH, CRP, D-dimer, ferritin, procalcitonin, ESR, PT/PTT, CK, lactate, troponin and following neutrophil/lymphocyte ratios (concerns with >20) to severely ill patients (ICU level) right around potential decompensation period (7-14 days)  -antibiotics only if there is concern for a bacterial process    3/15 symptom onset  3/23 N/L ratio 6.22/1.53 = 4.1

## 2020-03-23 NOTE — PROGRESS NOTE ADULT - PROBLEM SELECTOR PLAN 1
covid 19   id eval noted  biomarkers reviewed  labs reviewed  I and O noted  s/p ICU stay  o2 support  keep sat > 88 pct  I alexis  monitor for desat and decompensation   will follow

## 2020-03-23 NOTE — PHARMACOTHERAPY INTERVENTION NOTE - COMMENTS
COVID-19  Patient on azithromycin 500mg QD for COVID-19. Discussed with Dr. Bedoya that patient received 3 doses already and if we can discontinue to which she agreed.

## 2020-03-23 NOTE — PROGRESS NOTE ADULT - PROBLEM SELECTOR PLAN 2
suspicious for viral pneumonia - including the novel SARS-CoV-2  cannot exclude superimposed bacterial component at this point as well  will continue empiric CAP coverage with azithromycin and rocephin for now  f/u blood cultures; check legionella/strep urinary antigen; check procalcitonin  further antibiotics can be determined pending clinical course  supplemental O2 as needed  CT chest w/ concerning pattern for viral COVID-19 infection suspicious for viral pneumonia - including the novel SARS-CoV-2  cannot exclude superimposed bacterial component at this point as well  will continue empiric CAP coverage with azithromycin and rocephin for now. completed azithro  supplemental O2 as needed

## 2020-03-23 NOTE — PROGRESS NOTE ADULT - ATTENDING COMMENTS
high suspicion for covid-19 The tx plan was discussed in detail with the patient and family members at the bedside. Their questions and concerns were addressed to the best of my ability. They are in agreement with the plan as detailed above. They demonstrated adequate understanding of the counseling which I have provided.      Discussed at length w/ pt's daughter (at pt request)

## 2020-03-23 NOTE — CONSULT NOTE ADULT - SUBJECTIVE AND OBJECTIVE BOX
HPI:  58 y/o M with PMHx of HTN, DM2, BPH presented to the ED complaining of fever and shortness of breath past few days so symptom onset about 3/18. He recently returned from [place] [place] approximately one week ago - took chloroquine for his trip. He developed difficulty breathing and was seen in urgent care. At urgent care, noted to be hypoxic to 88% on room air with bilateral pneumonia and referred to the ED for further evaluation. He admits to dyspnea mostly with exertion. Denies chest pain, palpitations, abdominal pain, n/v/d. States that he is otherwise feeling well. Had no sick contacts. He states that his symptoms started about 6 days PTA. No other complaints.    In the ED, VSS although SpO2 91% on RA. Labs notable for: lymphopenia, normal WBC although with neutrophilic shift, H/H 12.6/37.4, Glucose 143, AST 79. CXR with bilateral infiltrates. EKG: Sinus tachycardia at 102bpm, no acute ST-T changes. Given rocephin and azithro in ED. (21 Mar 2020 01:48)      PAST MEDICAL & SURGICAL HISTORY:  Gastric ulcer  Dyspnea on exertion  Cardiomegaly  HLD (hyperlipidemia)  Type 2 diabetes mellitus  BPH (benign prostatic hyperplasia)  HTN (hypertension)  History of endoscopy      Antimicrobials  cefTRIAXone   IVPB 1000 milliGRAM(s) IV Intermittent every 24 hours  hydroxychloroquine 200 milliGRAM(s) Oral two times a day      Immunological      Other  acetaminophen   Tablet .. 1000 milliGRAM(s) Oral every 6 hours PRN  amLODIPine   Tablet 10 milliGRAM(s) Oral daily  atorvastatin 10 milliGRAM(s) Oral at bedtime  dextrose 40% Gel 15 Gram(s) Oral once PRN  dextrose 5%. 1000 milliLiter(s) IV Continuous <Continuous>  dextrose 50% Injectable 12.5 Gram(s) IV Push once  dextrose 50% Injectable 25 Gram(s) IV Push once  dextrose 50% Injectable 25 Gram(s) IV Push once  enoxaparin Injectable 40 milliGRAM(s) SubCutaneous at bedtime  glucagon  Injectable 1 milliGRAM(s) IntraMuscular once PRN  guaiFENesin   Syrup  (Sugar-Free) 200 milliGRAM(s) Oral every 6 hours PRN  insulin lispro (HumaLOG) corrective regimen sliding scale   SubCutaneous three times a day before meals  insulin lispro (HumaLOG) corrective regimen sliding scale   SubCutaneous at bedtime  melatonin 5 milliGRAM(s) Oral at bedtime  metoprolol succinate ER 25 milliGRAM(s) Oral daily  saccharomyces boulardii 250 milliGRAM(s) Oral two times a day  tamsulosin 0.8 milliGRAM(s) Oral at bedtime      Allergies    No Known Allergies    Intolerances        SOCIAL HISTORY:  Social History:  Lives at home,   Recent travel to [place]  Denies tobacco use  Denies EtOH use  Denies illicit drug use (21 Mar 2020 01:48)      FAMILY HISTORY:  FH: CAD (coronary artery disease): sister (with hx of coronary stent) and father (with history of CABG)  Family history of CABG: father      ROS:    EYES:  Negative  blurry vision or double vision  GASTROINTESTINAL:  Negative for nausea, vomiting, diarrhea  -otherwise negative except for subjective    Vital Signs Last 24 Hrs  T(C): 36.9 (23 Mar 2020 09:00), Max: 37.8 (22 Mar 2020 20:30)  T(F): 98.5 (23 Mar 2020 09:00), Max: 100 (22 Mar 2020 20:30)  HR: 85 (23 Mar 2020 07:00) (78 - 100)  BP: 130/61 (23 Mar 2020 07:00) (109/57 - 159/74)  BP(mean): 88 (23 Mar 2020 07:00) (78 - 107)  RR: 27 (23 Mar 2020 07:00) (24 - 37)  SpO2: 93% (23 Mar 2020 07:00) (88% - 98%)    PE:   HEENT:  NC, not on vent on oxygen  Neck:  Supple, no adenopathy  Lungs:  No accessory muscle use, breathing comfortably  Cor:  distant  Abd:  nondistended, normal in appearance  Extrem:  No cyanosis  Skin:  No rashes.      LABS:                        11.3   8.47  )-----------( 397      ( 23 Mar 2020 06:22 )             34.6     Auto Neutrophil #: 6.22 K/uL (03.23.20 @ 06:22)    Auto Lymphocyte #: 1.53 K/uL (03.23.20 @ 06:22)    Auto Neutrophil #: 8.22 K/uL (03.22.20 @ 09:28)    Auto Lymphocyte #: 1.00 K/uL (03.22.20 @ 09:28)    Auto Eosinophil #: 0.06 K/uL (03.23.20 @ 06:22)    Auto Eosinophil #: 0.01 K/uL (03.22.20 @ 09:28)        WBC Count: 8.47 K/uL (03-23-20 @ 06:22)  WBC Count: 9.66 K/uL (03-22-20 @ 09:28)  WBC Count: 8.97 K/uL (03-21-20 @ 08:38)  WBC Count: 10.35 K/uL (03-21-20 @ 01:11)      03-23    140  |  103  |  10  ----------------------------<  96  3.5   |  29  |  0.83    Ca    8.3<L>      23 Mar 2020 06:22  Phos  3.0     03-23  Mg     2.1     03-23    TPro  6.1  /  Alb  2.3<L>  /  TBili  0.4  /  DBili  x   /  AST  68<H>  /  ALT  59  /  AlkPhos  51  03-23      Creatinine, Serum: 0.83 mg/dL (03-23-20 @ 06:22)  Creatinine, Serum: 0.96 mg/dL (03-22-20 @ 09:28)  Creatinine, Serum: 0.95 mg/dL (03-21-20 @ 08:38)  Creatinine, Serum: 0.97 mg/dL (03-21-20 @ 01:11)      MICROBIOLOGY:    COVID-19 PCR: Detected: LDT -  (03.21.20 @ 03:43)    RADIOLOGY & ADDITIONAL STUDIES:    --< from: CT Chest No Cont (03.21.20 @ 04:50) >    EXAM:  CT CHEST                            PROCEDURE DATE:  03/21/2020          INTERPRETATION:  Exam: CT Chest Without Contrast   Exam date and time: 3/21/2020 4:27 AM   Age: 59 years old   Clinical indication: Shortness of breath     TECHNIQUE:   Imaging protocol: Computed tomography of the chest without contrast.     COMPARISON:   DX XR CHEST PA AND LATERAL 3/21/2020 12:26 AM     FINDINGS:     Lungs: Central tracheobronchial tree is grossly patent. No endobronchial lesions. Multifocal patchyairspace disease with associated groundglass opacities throughout the lung fields compatible with a multifocal pneumonia with consideration for viral pneumonia including SARS-2-CoV/COVID-19. No pleural effusion.  Pleural space: Unremarkable. No pneumothorax. No pleural effusion.   Heart: Mild cardiomegaly.   Aorta: No aortic aneurysm.   Lymph nodes: Small reactive mediastinal lymph nodes.   Liver: Diffuse hepatic steatosis.   Kidneys and ureters: Hyperdense probable hemorrhagic cyst left kidney. This measures 2 cm.   Bones/joints: Degenerative changes. No acute fracture.   Soft tissues: Unremarkable.     IMPRESSION:     1. Multifocal patchy airspace disease with associated groundglass opacities throughout the lung fields compatible with a multifocal pneumonia with consideration for viral pneumonia including SARS-2-CoV/COVID-19.     2. Hepatic steatosis.   3. Partially imaged probable hemorrhagic cyst left kidney.    Addendum created on 3/21/2020 5:02:13 AM EDT     Findings discussed with Dr. Mendez  by Boris Crisostomo MD  at 5:01 a.m. 03/21/2020

## 2020-03-24 LAB
ALBUMIN SERPL ELPH-MCNC: 2.4 G/DL — LOW (ref 3.3–5)
ALP SERPL-CCNC: 81 U/L — SIGNIFICANT CHANGE UP (ref 40–120)
ALT FLD-CCNC: 92 U/L — HIGH (ref 12–78)
ANION GAP SERPL CALC-SCNC: 7 MMOL/L — SIGNIFICANT CHANGE UP (ref 5–17)
APTT BLD: 30.1 SEC — SIGNIFICANT CHANGE UP (ref 28.5–37)
AST SERPL-CCNC: 91 U/L — HIGH (ref 15–37)
BASOPHILS # BLD AUTO: 0.03 K/UL — SIGNIFICANT CHANGE UP (ref 0–0.2)
BASOPHILS NFR BLD AUTO: 0.3 % — SIGNIFICANT CHANGE UP (ref 0–2)
BILIRUB SERPL-MCNC: 0.6 MG/DL — SIGNIFICANT CHANGE UP (ref 0.2–1.2)
BUN SERPL-MCNC: 8 MG/DL — SIGNIFICANT CHANGE UP (ref 7–23)
CALCIUM SERPL-MCNC: 8.4 MG/DL — LOW (ref 8.5–10.1)
CHLORIDE SERPL-SCNC: 101 MMOL/L — SIGNIFICANT CHANGE UP (ref 96–108)
CK SERPL-CCNC: 77 U/L — SIGNIFICANT CHANGE UP (ref 26–308)
CO2 SERPL-SCNC: 31 MMOL/L — SIGNIFICANT CHANGE UP (ref 22–31)
CREAT SERPL-MCNC: 0.8 MG/DL — SIGNIFICANT CHANGE UP (ref 0.5–1.3)
EOSINOPHIL # BLD AUTO: 0.07 K/UL — SIGNIFICANT CHANGE UP (ref 0–0.5)
EOSINOPHIL NFR BLD AUTO: 0.7 % — SIGNIFICANT CHANGE UP (ref 0–6)
FERRITIN SERPL-MCNC: 834 NG/ML — HIGH (ref 30–400)
GLUCOSE SERPL-MCNC: 121 MG/DL — HIGH (ref 70–99)
HCT VFR BLD CALC: 37.5 % — LOW (ref 39–50)
HGB BLD-MCNC: 12.4 G/DL — LOW (ref 13–17)
IMM GRANULOCYTES NFR BLD AUTO: 1.4 % — SIGNIFICANT CHANGE UP (ref 0–1.5)
INR BLD: 1.29 RATIO — HIGH (ref 0.88–1.16)
LACTATE SERPL-SCNC: 1.2 MMOL/L — SIGNIFICANT CHANGE UP (ref 0.7–2)
LDH SERPL L TO P-CCNC: 527 U/L — HIGH (ref 50–242)
LYMPHOCYTES # BLD AUTO: 1.04 K/UL — SIGNIFICANT CHANGE UP (ref 1–3.3)
LYMPHOCYTES # BLD AUTO: 10.9 % — LOW (ref 13–44)
MAGNESIUM SERPL-MCNC: 2.1 MG/DL — SIGNIFICANT CHANGE UP (ref 1.6–2.6)
MCHC RBC-ENTMCNC: 24.8 PG — LOW (ref 27–34)
MCHC RBC-ENTMCNC: 33.1 GM/DL — SIGNIFICANT CHANGE UP (ref 32–36)
MCV RBC AUTO: 75.2 FL — LOW (ref 80–100)
MONOCYTES # BLD AUTO: 0.7 K/UL — SIGNIFICANT CHANGE UP (ref 0–0.9)
MONOCYTES NFR BLD AUTO: 7.4 % — SIGNIFICANT CHANGE UP (ref 2–14)
NEUTROPHILS # BLD AUTO: 7.55 K/UL — HIGH (ref 1.8–7.4)
NEUTROPHILS NFR BLD AUTO: 79.3 % — HIGH (ref 43–77)
NRBC # BLD: 0 /100 WBCS — SIGNIFICANT CHANGE UP (ref 0–0)
PHOSPHATE SERPL-MCNC: 3.1 MG/DL — SIGNIFICANT CHANGE UP (ref 2.5–4.5)
PLATELET # BLD AUTO: 529 K/UL — HIGH (ref 150–400)
POTASSIUM SERPL-MCNC: 3.8 MMOL/L — SIGNIFICANT CHANGE UP (ref 3.5–5.3)
POTASSIUM SERPL-SCNC: 3.8 MMOL/L — SIGNIFICANT CHANGE UP (ref 3.5–5.3)
PROT SERPL-MCNC: 7 G/DL — SIGNIFICANT CHANGE UP (ref 6–8.3)
PROTHROM AB SERPL-ACNC: 14.6 SEC — HIGH (ref 10–12.9)
RAPID RVP RESULT: SIGNIFICANT CHANGE UP
RBC # BLD: 4.99 M/UL — SIGNIFICANT CHANGE UP (ref 4.2–5.8)
RBC # FLD: 14.2 % — SIGNIFICANT CHANGE UP (ref 10.3–14.5)
SODIUM SERPL-SCNC: 139 MMOL/L — SIGNIFICANT CHANGE UP (ref 135–145)
WBC # BLD: 9.52 K/UL — SIGNIFICANT CHANGE UP (ref 3.8–10.5)
WBC # FLD AUTO: 9.52 K/UL — SIGNIFICANT CHANGE UP (ref 3.8–10.5)

## 2020-03-24 PROCEDURE — 99233 SBSQ HOSP IP/OBS HIGH 50: CPT | Mod: GC

## 2020-03-24 RX ADMIN — Medication 5 MILLIGRAM(S): at 22:01

## 2020-03-24 RX ADMIN — TAMSULOSIN HYDROCHLORIDE 0.8 MILLIGRAM(S): 0.4 CAPSULE ORAL at 22:00

## 2020-03-24 RX ADMIN — ATORVASTATIN CALCIUM 10 MILLIGRAM(S): 80 TABLET, FILM COATED ORAL at 22:01

## 2020-03-24 RX ADMIN — AMLODIPINE BESYLATE 10 MILLIGRAM(S): 2.5 TABLET ORAL at 06:09

## 2020-03-24 RX ADMIN — ENOXAPARIN SODIUM 40 MILLIGRAM(S): 100 INJECTION SUBCUTANEOUS at 22:01

## 2020-03-24 RX ADMIN — Medication 250 MILLIGRAM(S): at 06:10

## 2020-03-24 RX ADMIN — Medication 1000 MILLIGRAM(S): at 02:05

## 2020-03-24 RX ADMIN — Medication 1000 MILLIGRAM(S): at 01:15

## 2020-03-24 RX ADMIN — Medication 200 MILLIGRAM(S): at 06:09

## 2020-03-24 RX ADMIN — Medication 25 MILLIGRAM(S): at 18:19

## 2020-03-24 RX ADMIN — Medication 250 MILLIGRAM(S): at 18:19

## 2020-03-24 RX ADMIN — Medication 25 MILLIGRAM(S): at 06:10

## 2020-03-24 NOTE — PROGRESS NOTE ADULT - PROBLEM SELECTOR PLAN 1
covid 19 positive  in bed  seen and examined  on o2 support  febrile  tylenol and robitussin PRN  s.p. ICU -   on plaquenil - and rocephin -   no concrete data on plaquenil -   labs and imaging and biomarkers reviewed  at risk for decompensation  management of medical comorbidities  out of bed  needs to use I alexis  will follow  isol precs in effect

## 2020-03-24 NOTE — PROGRESS NOTE ADULT - SUBJECTIVE AND OBJECTIVE BOX
Date/Time Patient Seen:  		  Referring MD:   Data Reviewed	       Patient is a 59y old  Male who presents with a chief complaint of hypoxic respiratory failure; r/o COVID-19; bilateral pneumonia (23 Mar 2020 18:45)      Subjective/HPI     PAST MEDICAL & SURGICAL HISTORY:  Gastric ulcer  Dyspnea on exertion  Cardiomegaly  HLD (hyperlipidemia)  Type 2 diabetes mellitus  BPH (benign prostatic hyperplasia)  HTN (hypertension)  History of endoscopy        Medication list         MEDICATIONS  (STANDING):  amLODIPine   Tablet 10 milliGRAM(s) Oral daily  atorvastatin 10 milliGRAM(s) Oral at bedtime  cefTRIAXone   IVPB 1000 milliGRAM(s) IV Intermittent every 24 hours  dextrose 5%. 1000 milliLiter(s) (50 mL/Hr) IV Continuous <Continuous>  dextrose 50% Injectable 12.5 Gram(s) IV Push once  dextrose 50% Injectable 25 Gram(s) IV Push once  dextrose 50% Injectable 25 Gram(s) IV Push once  enoxaparin Injectable 40 milliGRAM(s) SubCutaneous at bedtime  hydroxychloroquine 200 milliGRAM(s) Oral two times a day  insulin lispro (HumaLOG) corrective regimen sliding scale   SubCutaneous three times a day before meals  insulin lispro (HumaLOG) corrective regimen sliding scale   SubCutaneous at bedtime  melatonin 5 milliGRAM(s) Oral at bedtime  metoprolol tartrate 25 milliGRAM(s) Oral two times a day  saccharomyces boulardii 250 milliGRAM(s) Oral two times a day  tamsulosin 0.8 milliGRAM(s) Oral at bedtime    MEDICATIONS  (PRN):  acetaminophen   Tablet .. 1000 milliGRAM(s) Oral every 6 hours PRN Temp greater or equal to 38C (100.4F), Mild Pain (1 - 3)  dextrose 40% Gel 15 Gram(s) Oral once PRN Blood Glucose LESS THAN 70 milliGRAM(s)/deciliter  glucagon  Injectable 1 milliGRAM(s) IntraMuscular once PRN Glucose LESS THAN 70 milligrams/deciliter  guaiFENesin   Syrup  (Sugar-Free) 200 milliGRAM(s) Oral every 6 hours PRN Cough         Vitals log        ICU Vital Signs Last 24 Hrs  T(C): 36.9 (24 Mar 2020 05:55), Max: 38 (24 Mar 2020 01:10)  T(F): 98.5 (24 Mar 2020 05:55), Max: 100.4 (24 Mar 2020 01:10)  HR: 88 (24 Mar 2020 05:55) (85 - 97)  BP: 136/72 (24 Mar 2020 05:55) (122/71 - 143/82)  BP(mean): 88 (23 Mar 2020 11:00) (88 - 91)  ABP: --  ABP(mean): --  RR: 16 (24 Mar 2020 05:55) (14 - 31)  SpO2: 95% (24 Mar 2020 05:55) (92% - 98%)           Input and Output:  I&O's Detail    22 Mar 2020 07:01  -  23 Mar 2020 07:00  --------------------------------------------------------  IN:    Oral Fluid: 1050 mL    Solution: 250 mL    Solution: 100 mL  Total IN: 1400 mL    OUT:    Voided: 1000 mL  Total OUT: 1000 mL    Total NET: 400 mL      23 Mar 2020 07:01  -  24 Mar 2020 06:57  --------------------------------------------------------  IN:  Total IN: 0 mL    OUT:    Voided: 600 mL  Total OUT: 600 mL    Total NET: -600 mL          Lab Data                        11.3   8.47  )-----------( 397      ( 23 Mar 2020 06:22 )             34.6     03-23    140  |  103  |  10  ----------------------------<  96  3.5   |  29  |  0.83    Ca    8.3<L>      23 Mar 2020 06:22  Phos  3.0     03-23  Mg     2.1     03-23    TPro  6.1  /  Alb  2.3<L>  /  TBili  0.4  /  DBili  x   /  AST  68<H>  /  ALT  59  /  AlkPhos  51  03-23      CARDIAC MARKERS ( 23 Mar 2020 06:22 )  <.015 ng/mL / x     / x     / x     / x            Review of Systems	      Objective     Physical Examination    heart s1s2  lung dc BS  abd soft  obese      Pertinent Lab findings & Imaging      Renay:  NO   Adequate UO     I&O's Detail    22 Mar 2020 07:01  -  23 Mar 2020 07:00  --------------------------------------------------------  IN:    Oral Fluid: 1050 mL    Solution: 250 mL    Solution: 100 mL  Total IN: 1400 mL    OUT:    Voided: 1000 mL  Total OUT: 1000 mL    Total NET: 400 mL      23 Mar 2020 07:01  -  24 Mar 2020 06:57  --------------------------------------------------------  IN:  Total IN: 0 mL    OUT:    Voided: 600 mL  Total OUT: 600 mL    Total NET: -600 mL               Discussed with:     Cultures:	        Radiology

## 2020-03-24 NOTE — PROGRESS NOTE ADULT - ASSESSMENT
58 y/o M with PMHx of HTN, DM2, BPH presented to the ED complaining of fever, cough and congestion for the past few days admitted for acute hypoxic respiratory failure 2/2 bilateral pneumonia, rule out COVID-19.

## 2020-03-24 NOTE — PHARMACOTHERAPY INTERVENTION NOTE - COMMENTS
Patient on plaquenil for COVID-19. Recommended that duration of therapy done in studies so far is limited to 5 days to which MD agreed. Entered stop date for 3/26 @12:00 which is when patient will complete course of plaquenil 200mg BID x 4 days

## 2020-03-24 NOTE — PROGRESS NOTE ADULT - SUBJECTIVE AND OBJECTIVE BOX
Patient is a 59y old  Male who presents with a chief complaint of hypoxic respiratory failure; r/o COVID-19; bilateral pneumonia (24 Mar 2020 06:57)      HPI:  60 y/o M with PMHx of HTN, DM2, BPH presented to the ED complaining of fever and shortness of breath past few days. He recently returned from [place] [place] [facility] [place] approximately one week ago - took chloroquine for his trip. He developed difficulty breathing and was seen in urgent care. At urgent care, noted to be hypoxic to 88% on room air with bilateral pneumonia and referred to the ED for further evaluation. He admits to dyspnea mostly with exertion. Denies chest pain, palpitations, abdominal pain, n/v/d. States that he is otherwise feeling well. Had no sick contacts. He states that his symptoms started about 6 days PTA. No other complaints.   admitted for acute hypoxic respiratory failure 2/2 bilateral pneumonia  , +  COVID-19.  pt seen and examine today alert awake , sob + , no  distress , dry cough , no fever , oob .   INTERVAL HPI/OVERNIGHT EVENTS:  T(C): 36.7 (03-24-20 @ 08:02), Max: 38 (03-24-20 @ 01:10)  HR: 81 (03-24-20 @ 08:02) (81 - 94)  BP: 115/69 (03-24-20 @ 08:02) (115/69 - 136/72)  RR: 18 (03-24-20 @ 08:02) (14 - 18)  SpO2: 91% (03-24-20 @ 08:02) (91% - 96%)  Wt(kg): --  I&O's Summary    23 Mar 2020 07:01  -  24 Mar 2020 07:00  --------------------------------------------------------  IN: 0 mL / OUT: 600 mL / NET: -600 mL        REVIEW OF SYSTEMS:  CONSTITUTIONAL: No fever, weight loss,  ,+fatigue  EYES: No eye pain, visual disturbances, or discharge  ENMT:  No difficulty hearing, tinnitus, vertigo; No sinus or throat pain  NECK: No pain or stiffness  RESPIRATORY: +  cough, wheezing, chills ,  + shortness of breath  CARDIOVASCULAR: No chest pain, palpitations, dizziness, or leg swelling  GASTROINTESTINAL: No abdominal or epigastric pain. No nausea, vomiting,    No diarrhea or constipation. No melena or hematochezia.  GENITOURINARY: No dysuria, frequency, hematuria, or incontinence  NEUROLOGICAL: No headaches, memory loss, loss of strength, numbness, or tremors  SKIN: No itching, burning, rashes, or lesions   MUSCULOSKELETAL: No joint pain or swelling; No muscle, back, or extremity pain      PHYSICAL EXAM:  GENERAL: NAD, well-groomed, well-developed  HEAD:  Atraumatic, Normocephalic  EYES: EOMI, PERRLA, conjunctiva and sclera clear  ENMT: Moist mucous membranes,   NECK: Supple, No JVD,   NERVOUS SYSTEM:  Alert & Oriented X3, Motor Strength 5/5 B/L upper and lower extremities;   DTRs 2+ intact and symmetric  CHEST/LUNG:  percussion bilaterally  coarse  + ,+ congestions , ; No rales, rhonchi, wheezing,   HEART: Regular rate and rhythm; No murmur , no tachy   ABDOMEN: Soft, Nontender, Nondistended; Bowel sounds present  EXTREMITIES:  2+ Peripheral Pulses, No clubbing, cyanosis, or edema  SKIN: No rashes or lesions    MEDICATIONS  (STANDING):  amLODIPine   Tablet 10 milliGRAM(s) Oral daily  atorvastatin 10 milliGRAM(s) Oral at bedtime  cefTRIAXone   IVPB 1000 milliGRAM(s) IV Intermittent every 24 hours  dextrose 5%. 1000 milliLiter(s) (50 mL/Hr) IV Continuous <Continuous>  dextrose 50% Injectable 12.5 Gram(s) IV Push once  dextrose 50% Injectable 25 Gram(s) IV Push once  dextrose 50% Injectable 25 Gram(s) IV Push once  enoxaparin Injectable 40 milliGRAM(s) SubCutaneous at bedtime  hydroxychloroquine 200 milliGRAM(s) Oral two times a day  insulin lispro (HumaLOG) corrective regimen sliding scale   SubCutaneous three times a day before meals  insulin lispro (HumaLOG) corrective regimen sliding scale   SubCutaneous at bedtime  melatonin 5 milliGRAM(s) Oral at bedtime  metoprolol tartrate 25 milliGRAM(s) Oral two times a day  saccharomyces boulardii 250 milliGRAM(s) Oral two times a day  tamsulosin 0.8 milliGRAM(s) Oral at bedtime    MEDICATIONS  (PRN):  acetaminophen   Tablet .. 1000 milliGRAM(s) Oral every 6 hours PRN Temp greater or equal to 38C (100.4F), Mild Pain (1 - 3)  dextrose 40% Gel 15 Gram(s) Oral once PRN Blood Glucose LESS THAN 70 milliGRAM(s)/deciliter  glucagon  Injectable 1 milliGRAM(s) IntraMuscular once PRN Glucose LESS THAN 70 milligrams/deciliter  guaiFENesin   Syrup  (Sugar-Free) 200 milliGRAM(s) Oral every 6 hours PRN Cough      LABS:                        12.4   9.52  )-----------( 529      ( 24 Mar 2020 10:02 )             37.5     03-24    139  |  101  |  8   ----------------------------<  121<H>  3.8   |  31  |  0.80    Ca    8.4<L>      24 Mar 2020 10:02  Phos  3.1     03-24  Mg     2.1     03-24    TPro  7.0  /  Alb  2.4<L>  /  TBili  0.6  /  DBili  x   /  AST  91<H>  /  ALT  92<H>  /  AlkPhos  81  03-24    PT/INR - ( 24 Mar 2020 10:02 )   PT: 14.6 sec;   INR: 1.29 ratio         PTT - ( 24 Mar 2020 10:02 )  PTT:30.1 sec    CAPILLARY BLOOD GLUCOSE      POCT Blood Glucose.: 133 mg/dL (24 Mar 2020 12:37)  POCT Blood Glucose.: 106 mg/dL (24 Mar 2020 07:55)  POCT Blood Glucose.: 144 mg/dL (23 Mar 2020 21:01)  POCT Blood Glucose.: 147 mg/dL (23 Mar 2020 16:46)      03-21 @ 10:51   No growth to date.  --  --          RADIOLOGY & ADDITIONAL TESTS:    Imaging Personally Reviewed:     no new test   Advance Directives:  full code

## 2020-03-24 NOTE — PROGRESS NOTE ADULT - SUBJECTIVE AND OBJECTIVE BOX
infectious diseases progress note:    АЛЕКСАНДР CRAWFORD is a 59y y. o. Male patient    COVID Patient    Allergies    No Known Allergies    Intolerances    ANTIBIOTICS/RELEVANT:  antimicrobials    immunologic:    OTHER:  acetaminophen   Tablet .. 1000 milliGRAM(s) Oral every 6 hours PRN  amLODIPine   Tablet 10 milliGRAM(s) Oral daily  atorvastatin 10 milliGRAM(s) Oral at bedtime  dextrose 40% Gel 15 Gram(s) Oral once PRN  dextrose 5%. 1000 milliLiter(s) IV Continuous <Continuous>  dextrose 50% Injectable 12.5 Gram(s) IV Push once  dextrose 50% Injectable 25 Gram(s) IV Push once  dextrose 50% Injectable 25 Gram(s) IV Push once  enoxaparin Injectable 40 milliGRAM(s) SubCutaneous at bedtime  glucagon  Injectable 1 milliGRAM(s) IntraMuscular once PRN  guaiFENesin   Syrup  (Sugar-Free) 200 milliGRAM(s) Oral every 6 hours PRN  insulin lispro (HumaLOG) corrective regimen sliding scale   SubCutaneous three times a day before meals  insulin lispro (HumaLOG) corrective regimen sliding scale   SubCutaneous at bedtime  melatonin 5 milliGRAM(s) Oral at bedtime  metoprolol tartrate 25 milliGRAM(s) Oral two times a day  saccharomyces boulardii 250 milliGRAM(s) Oral two times a day  tamsulosin 0.8 milliGRAM(s) Oral at bedtime      Objective:  Vital Signs Last 24 Hrs  T(C): 36.7 (24 Mar 2020 08:02), Max: 38 (24 Mar 2020 01:10)  T(F): 98.1 (24 Mar 2020 08:02), Max: 100.4 (24 Mar 2020 01:10)  HR: 81 (24 Mar 2020 08:02) (81 - 94)  BP: 115/69 (24 Mar 2020 08:02) (115/69 - 136/72)  BP(mean): --  RR: 18 (24 Mar 2020 08:02) (14 - 18)  SpO2: 91% (24 Mar 2020 08:02) (91% - 96%)    T(C): 36.7 (03-24-20 @ 08:02), Max: 38 (03-24-20 @ 01:10)  T(C): 36.7 (03-24-20 @ 08:02), Max: 38.3 (03-22-20 @ 00:53)  T(C): 36.7 (03-24-20 @ 08:02), Max: 38.6 (03-21-20 @ 14:08)    PHYSICAL EXAM:  HEENT: NC atramautic  Neck: supple  Respiratory: no accessory muscle use, breathing comfortably  Cardiovascular: distant  Gastrointestinal: normal appearing, nondistended  Extremities: no clubbing, no cyanosis,      LABS:                          12.4   9.52  )-----------( 529      ( 24 Mar 2020 10:02 )             37.5       9.52 03-24 @ 10:02  8.47 03-23 @ 06:22  9.66 03-22 @ 09:28  8.97 03-21 @ 08:38  10.35 03-21 @ 01:11      03-24    139  |  101  |  8   ----------------------------<  121<H>  3.8   |  31  |  0.80    Ca    8.4<L>      24 Mar 2020 10:02  Phos  3.1     03-24  Mg     2.1     03-24    TPro  7.0  /  Alb  2.4<L>  /  TBili  0.6  /  DBili  x   /  AST  91<H>  /  ALT  92<H>  /  AlkPhos  81  03-24      Creatinine, Serum: 0.80 mg/dL (03-24-20 @ 10:02)  Creatinine, Serum: 0.83 mg/dL (03-23-20 @ 06:22)  Creatinine, Serum: 0.96 mg/dL (03-22-20 @ 09:28)  Creatinine, Serum: 0.95 mg/dL (03-21-20 @ 08:38)  Creatinine, Serum: 0.97 mg/dL (03-21-20 @ 01:11)      PT/INR - ( 24 Mar 2020 10:02 )   PT: 14.6 sec;   INR: 1.29 ratio         PTT - ( 24 Mar 2020 10:02 )  PTT:30.1 sec      COVID RISK SCORE    Auto Neutrophil #: 7.55 K/uL (03.24.20 @ 10:02)    Auto Lymphocyte #: 1.04 K/uL (03.24.20 @ 10:02)            MICROBIOLOGY:              RADIOLOGY & ADDITIONAL STUDIES:

## 2020-03-24 NOTE — PROGRESS NOTE ADULT - ASSESSMENT
58 y/o M with PMHx of HTN, DM2, BPH adm 3/21 recently returned from [place] ([place] and [place])   COVID-19---high risk period for decompensation  (7-14 days post symptom onset), avoid aerosolizing procedures,  steroids, NSAIDs ---no compelling evidence to date DO NOT recommend any specific therapies outside of established protocols or controlled trials -would support focus on supportive care  avoid excessive blood draws but do recommend for hospitalized patients  -daily CBC w diff to follow eos, lymphocytes and neutrophils and daily NLR calculation (NLR <3 low vs >5 high) -other labs that may be selectively used to risk stratify and predict clinical course,   Procalcitonin (<0.2 associated with more severe disease),  Ferritin (lower risk <450 vs >850) CRP (low risk <2 and higher risk >6) D-dimer (<1,000 vs >1,000) LDH, ESR, PT/PTT, CK, lactate, troponin, IL-6  -antibiotics only if there is concern for a bacterial process  -baseline and dynamic neutrophil lymphocyte ratio can risk stratify as age <50 and stable NLR <3 are low risk    3/15 symptom onset so criticla window 3/22-3/29  3/23 N/L ratio 6.22/1.53 = 4.1  3/24 NLR  =7.3 and increasing inflammatory markers suggesting concerns-discussed oxygen requirement and close observation for decompensation

## 2020-03-24 NOTE — PROGRESS NOTE ADULT - ATTENDING COMMENTS
pt seen and examine see above plan -60 y/o M with PMHx of HTN, DM2, BPH presented to the ED complaining of fever, cough and congestion for the past few days admitted for acute hypoxic respiratory failure 2/2  viral  bilateral pneumonia, +COVID-19 via  nc 3 to 4 lit - continuous pulse ox , dc Plaquenil as per id dr snider  discussion as marker of inflammation going up so this time harm of meds more than benefit.  daughter also spoke to id dr snider in length  agree with plan   .

## 2020-03-24 NOTE — PROGRESS NOTE ADULT - PROBLEM SELECTOR PLAN 2
suspicious for viral pneumonia - including the novel SARS-CoV-2  cannot exclude superimposed bacterial component at this point as well  off abx  , blood cult neg .  supplemental O2 as needed

## 2020-03-24 NOTE — PROGRESS NOTE ADULT - PROBLEM SELECTOR PLAN 1
2/2 COVID-19 viral PNA worsening neutrophil / lymphocyte , high ferritin and esr    remote tele/cont pulse oximetry  supplemental O2  3 to 4 lit  91  pulse  ox , monitor for increasing O2 requirements  - avoid nebs

## 2020-03-25 ENCOUNTER — TRANSCRIPTION ENCOUNTER (OUTPATIENT)
Age: 60
End: 2020-03-25

## 2020-03-25 LAB
ANION GAP SERPL CALC-SCNC: 8 MMOL/L — SIGNIFICANT CHANGE UP (ref 5–17)
BASOPHILS # BLD AUTO: 0.03 K/UL — SIGNIFICANT CHANGE UP (ref 0–0.2)
BASOPHILS NFR BLD AUTO: 0.3 % — SIGNIFICANT CHANGE UP (ref 0–2)
BUN SERPL-MCNC: 8 MG/DL — SIGNIFICANT CHANGE UP (ref 7–23)
CALCIUM SERPL-MCNC: 8.5 MG/DL — SIGNIFICANT CHANGE UP (ref 8.5–10.1)
CHLORIDE SERPL-SCNC: 103 MMOL/L — SIGNIFICANT CHANGE UP (ref 96–108)
CO2 SERPL-SCNC: 29 MMOL/L — SIGNIFICANT CHANGE UP (ref 22–31)
CREAT SERPL-MCNC: 0.75 MG/DL — SIGNIFICANT CHANGE UP (ref 0.5–1.3)
CRP SERPL-MCNC: 10.92 MG/DL — HIGH (ref 0–0.4)
D DIMER BLD IA.RAPID-MCNC: 497 NG/ML DDU — HIGH
EOSINOPHIL # BLD AUTO: 0.11 K/UL — SIGNIFICANT CHANGE UP (ref 0–0.5)
EOSINOPHIL NFR BLD AUTO: 1.3 % — SIGNIFICANT CHANGE UP (ref 0–6)
ERYTHROCYTE [SEDIMENTATION RATE] IN BLOOD: 34 MM/HR — HIGH (ref 0–20)
FERRITIN SERPL-MCNC: 791 NG/ML — HIGH (ref 30–400)
GLUCOSE SERPL-MCNC: 98 MG/DL — SIGNIFICANT CHANGE UP (ref 70–99)
HCT VFR BLD CALC: 34.6 % — LOW (ref 39–50)
HGB BLD-MCNC: 11.2 G/DL — LOW (ref 13–17)
IMM GRANULOCYTES NFR BLD AUTO: 1.4 % — SIGNIFICANT CHANGE UP (ref 0–1.5)
LYMPHOCYTES # BLD AUTO: 1.31 K/UL — SIGNIFICANT CHANGE UP (ref 1–3.3)
LYMPHOCYTES # BLD AUTO: 14.9 % — SIGNIFICANT CHANGE UP (ref 13–44)
MAGNESIUM SERPL-MCNC: 2.1 MG/DL — SIGNIFICANT CHANGE UP (ref 1.6–2.6)
MCHC RBC-ENTMCNC: 24.3 PG — LOW (ref 27–34)
MCHC RBC-ENTMCNC: 32.4 GM/DL — SIGNIFICANT CHANGE UP (ref 32–36)
MCV RBC AUTO: 75.1 FL — LOW (ref 80–100)
MONOCYTES # BLD AUTO: 0.89 K/UL — SIGNIFICANT CHANGE UP (ref 0–0.9)
MONOCYTES NFR BLD AUTO: 10.1 % — SIGNIFICANT CHANGE UP (ref 2–14)
NEUTROPHILS # BLD AUTO: 6.32 K/UL — SIGNIFICANT CHANGE UP (ref 1.8–7.4)
NEUTROPHILS NFR BLD AUTO: 72 % — SIGNIFICANT CHANGE UP (ref 43–77)
NRBC # BLD: 0 /100 WBCS — SIGNIFICANT CHANGE UP (ref 0–0)
PHOSPHATE SERPL-MCNC: 3.3 MG/DL — SIGNIFICANT CHANGE UP (ref 2.5–4.5)
PLATELET # BLD AUTO: 568 K/UL — HIGH (ref 150–400)
POTASSIUM SERPL-MCNC: 3.7 MMOL/L — SIGNIFICANT CHANGE UP (ref 3.5–5.3)
POTASSIUM SERPL-SCNC: 3.7 MMOL/L — SIGNIFICANT CHANGE UP (ref 3.5–5.3)
PROCALCITONIN SERPL-MCNC: 0.21 NG/ML — HIGH (ref 0–0.04)
RBC # BLD: 4.61 M/UL — SIGNIFICANT CHANGE UP (ref 4.2–5.8)
RBC # FLD: 14.3 % — SIGNIFICANT CHANGE UP (ref 10.3–14.5)
SODIUM SERPL-SCNC: 140 MMOL/L — SIGNIFICANT CHANGE UP (ref 135–145)
WBC # BLD: 8.78 K/UL — SIGNIFICANT CHANGE UP (ref 3.8–10.5)
WBC # FLD AUTO: 8.78 K/UL — SIGNIFICANT CHANGE UP (ref 3.8–10.5)

## 2020-03-25 PROCEDURE — 99233 SBSQ HOSP IP/OBS HIGH 50: CPT

## 2020-03-25 RX ORDER — ALBUTEROL 90 UG/1
2 AEROSOL, METERED ORAL EVERY 6 HOURS
Refills: 0 | Status: DISCONTINUED | OUTPATIENT
Start: 2020-03-25 | End: 2020-03-29

## 2020-03-25 RX ADMIN — Medication 250 MILLIGRAM(S): at 17:14

## 2020-03-25 RX ADMIN — ATORVASTATIN CALCIUM 10 MILLIGRAM(S): 80 TABLET, FILM COATED ORAL at 22:18

## 2020-03-25 RX ADMIN — AMLODIPINE BESYLATE 10 MILLIGRAM(S): 2.5 TABLET ORAL at 05:48

## 2020-03-25 RX ADMIN — Medication 5 MILLIGRAM(S): at 22:19

## 2020-03-25 RX ADMIN — Medication 25 MILLIGRAM(S): at 05:48

## 2020-03-25 RX ADMIN — TAMSULOSIN HYDROCHLORIDE 0.8 MILLIGRAM(S): 0.4 CAPSULE ORAL at 22:19

## 2020-03-25 RX ADMIN — Medication 250 MILLIGRAM(S): at 05:48

## 2020-03-25 RX ADMIN — ENOXAPARIN SODIUM 40 MILLIGRAM(S): 100 INJECTION SUBCUTANEOUS at 22:18

## 2020-03-25 RX ADMIN — Medication 25 MILLIGRAM(S): at 17:13

## 2020-03-25 NOTE — PROGRESS NOTE ADULT - PROBLEM SELECTOR PLAN 1
in bed  seen and examined  overnight events noted  isol precs  on and off o2 support  labs and biomarkers reviewed  ID follow up noted - remains in the critical window -   I alexis as tolerated  robitussin and tylenol prn - sx management  cont to monitor VS and HD and Sat and pulse ox - cont.

## 2020-03-25 NOTE — PROGRESS NOTE ADULT - PROBLEM SELECTOR PLAN 1
Fine per ID to look at DC planning and pt seems to have peaked and is now improving.  Thank you for consulting us and involving us in the management of this most interesting and challenging case.     Please Call with any further questions

## 2020-03-25 NOTE — DISCHARGE NOTE PROVIDER - NSDCMRMEDTOKEN_GEN_ALL_CORE_FT
alfuzosin 10 mg oral tablet, extended release: 1 tab(s) orally once a day  amLODIPine 10 mg oral tablet: 1 tab(s) orally once a day  metFORMIN 500 mg oral tablet: 1 tab(s) orally once a day  Metoprolol Succinate ER 25 mg oral tablet, extended release: 1 tab(s) orally once a day acetaminophen 500 mg oral tablet: 2 tab(s) orally every 6 hours, As needed, Temp greater or equal to 38C (100.4F), Mild Pain (1 - 3)  albuterol 90 mcg/inh inhalation aerosol: 2 puff(s) inhaled every 6 hours, As needed, Shortness of Breath and/or Wheezing  alfuzosin 10 mg oral tablet, extended release: 1 tab(s) orally once a day  amLODIPine 10 mg oral tablet: 1 tab(s) orally once a day  atorvastatin 10 mg oral tablet: 1 tab(s) orally once a day (at bedtime)  guaiFENesin 100 mg/5 mL oral liquid: 10 milliliter(s) orally every 6 hours, As needed, Cough  metFORMIN 500 mg oral tablet: 1 tab(s) orally once a day  metoprolol tartrate 50 mg oral tablet: 1 tab(s) orally 2 times a day

## 2020-03-25 NOTE — PROGRESS NOTE ADULT - SUBJECTIVE AND OBJECTIVE BOX
Date/Time Patient Seen:  		  Referring MD:   Data Reviewed	       Patient is a 59y old  Male who presents with a chief complaint of hypoxic respiratory failure; r/o COVID-19; bilateral pneumonia (24 Mar 2020 14:40)      Subjective/HPI     PAST MEDICAL & SURGICAL HISTORY:  Gastric ulcer  Dyspnea on exertion  Cardiomegaly  HLD (hyperlipidemia)  Type 2 diabetes mellitus  BPH (benign prostatic hyperplasia)  HTN (hypertension)  History of endoscopy        Medication list         MEDICATIONS  (STANDING):  amLODIPine   Tablet 10 milliGRAM(s) Oral daily  atorvastatin 10 milliGRAM(s) Oral at bedtime  dextrose 5%. 1000 milliLiter(s) (50 mL/Hr) IV Continuous <Continuous>  dextrose 50% Injectable 12.5 Gram(s) IV Push once  dextrose 50% Injectable 25 Gram(s) IV Push once  dextrose 50% Injectable 25 Gram(s) IV Push once  enoxaparin Injectable 40 milliGRAM(s) SubCutaneous at bedtime  insulin lispro (HumaLOG) corrective regimen sliding scale   SubCutaneous three times a day before meals  insulin lispro (HumaLOG) corrective regimen sliding scale   SubCutaneous at bedtime  melatonin 5 milliGRAM(s) Oral at bedtime  metoprolol tartrate 25 milliGRAM(s) Oral two times a day  saccharomyces boulardii 250 milliGRAM(s) Oral two times a day  tamsulosin 0.8 milliGRAM(s) Oral at bedtime    MEDICATIONS  (PRN):  acetaminophen   Tablet .. 1000 milliGRAM(s) Oral every 6 hours PRN Temp greater or equal to 38C (100.4F), Mild Pain (1 - 3)  dextrose 40% Gel 15 Gram(s) Oral once PRN Blood Glucose LESS THAN 70 milliGRAM(s)/deciliter  glucagon  Injectable 1 milliGRAM(s) IntraMuscular once PRN Glucose LESS THAN 70 milligrams/deciliter  guaiFENesin   Syrup  (Sugar-Free) 200 milliGRAM(s) Oral every 6 hours PRN Cough         Vitals log        ICU Vital Signs Last 24 Hrs  T(C): 36.7 (25 Mar 2020 05:47), Max: 36.9 (24 Mar 2020 16:00)  T(F): 98.1 (25 Mar 2020 05:47), Max: 98.4 (24 Mar 2020 16:00)  HR: 86 (25 Mar 2020 05:47) (81 - 90)  BP: 140/71 (25 Mar 2020 05:47) (115/69 - 148/70)  BP(mean): --  ABP: --  ABP(mean): --  RR: 18 (25 Mar 2020 05:47) (17 - 18)  SpO2: 96% (25 Mar 2020 05:47) (91% - 98%)           Input and Output:  I&O's Detail    23 Mar 2020 07:01  -  24 Mar 2020 07:00  --------------------------------------------------------  IN:  Total IN: 0 mL    OUT:    Voided: 600 mL  Total OUT: 600 mL    Total NET: -600 mL          Lab Data                        12.4   9.52  )-----------( 529      ( 24 Mar 2020 10:02 )             37.5     03-24    139  |  101  |  8   ----------------------------<  121<H>  3.8   |  31  |  0.80    Ca    8.4<L>      24 Mar 2020 10:02  Phos  3.1     03-24  Mg     2.1     03-24    TPro  7.0  /  Alb  2.4<L>  /  TBili  0.6  /  DBili  x   /  AST  91<H>  /  ALT  92<H>  /  AlkPhos  81  03-24      CARDIAC MARKERS ( 24 Mar 2020 10:02 )  <.015 ng/mL / x     / 77 U/L / x     / x            Review of Systems	      Objective     Physical Examination    heart s1s2  lung dc BS      Pertinent Lab findings & Imaging      Renay:  NO   Adequate UO     I&O's Detail    23 Mar 2020 07:01  -  24 Mar 2020 07:00  --------------------------------------------------------  IN:  Total IN: 0 mL    OUT:    Voided: 600 mL  Total OUT: 600 mL    Total NET: -600 mL               Discussed with:     Cultures:	        Radiology

## 2020-03-25 NOTE — DISCHARGE NOTE PROVIDER - HOSPITAL COURSE
FROM ADMISSION H+P:     HPI:    58 y/o M with PMHx of HTN, DM2, BPH presented to the ED complaining of fever and shortness of breath past few days. He recently returned from [place] ([place] approximately one week ago - took chloroquine for his trip. He developed difficulty breathing and was seen in urgent care. At urgent care, noted to be hypoxic to 88% on room air with bilateral pneumonia and referred to the ED for further evaluation. He admits to dyspnea mostly with exertion. Denies chest pain, palpitations, abdominal pain, n/v/d. States that he is otherwise feeling well. Had no sick contacts. He states that his symptoms started about 6 days PTA. No other complaints.        In the ED, VSS although SpO2 91% on RA. Labs notable for: lymphopenia, normal WBC although with neutrophilic shift, H/H 12.6/37.4, Glucose 143, AST 79. CXR with bilateral infiltrates. EKG: Sinus tachycardia at 102bpm, no acute ST-T changes. Given rocephin and axelro in ED. (21 Mar 2020 01:48)            ---    HOSPITAL COURSE:         Patient transferred to general medical floor for further management. Pulmonology, Dr. Arredondo and Infectious disease, Dr. Mnuguia consulted. COVID19 PCR resulted positive. On second day of hospital stay patient was transferred ICU due to worsening hypoxia requiring NRB. After improvement in respiratory status the next day, patient was transferred back to general medicine floor. Patient was managed with supportive treatment including supplemental O2. RVP panel resulted as negative for co-infections. Urine legionella and strep pneumoniae were negative. Blood cultures x2 and urine cultures no growth to date. Patient was treated with plaquenil. Patient completed a course of azithromycin. Inflammatory markers (ferritin, ESR, CRP, LDH) were trended and improved. Procalcitonin was <.05. Troponins were negative x2. Patient's respiratory status was monitored closely and improved through out hospital course.         ---    CONSULTANTS:         Dr. Crockett - Pulmonology    Dr. Munguia - Infectious Disease        ---    TIME SPENT:    The total amount of time spent reviewing the hospital notes, laboratory values, imaging findings, assessing/counseling the patient, discussing with consultant physicians, social work, nursing staff was -- minutes        --- FROM ADMISSION H+P:     HPI:    58 y/o M with PMHx of HTN, DM2, BPH presented to the ED complaining of fever and shortness of breath past few days. He recently returned from [place] ([place] approximately one week ago - took chloroquine for his trip. He developed difficulty breathing and was seen in urgent care. At urgent care, noted to be hypoxic to 88% on room air with bilateral pneumonia and referred to the ED for further evaluation. He admits to dyspnea mostly with exertion. Denies chest pain, palpitations, abdominal pain, n/v/d. States that he is otherwise feeling well. Had no sick contacts. He states that his symptoms started about 6 days PTA. No other complaints.        In the ED, VSS although SpO2 91% on RA. Labs notable for: lymphopenia, normal WBC although with neutrophilic shift, H/H 12.6/37.4, Glucose 143, AST 79. CXR with bilateral infiltrates. EKG: Sinus tachycardia at 102bpm, no acute ST-T changes. Given rocephin and nick in ED. (21 Mar 2020 01:48)            ---    HOSPITAL COURSE:         Patient transferred to general medical floor for further management. Pulmonology, Dr. Arredondo and Infectious disease, Dr. Munguia consulted. CT chest showed Multifocal patchy airspace disease with associated groundglass opacities throughout the lung fields compatible with a multifocal pneumonia with consideration for viral pneumonia including SARS-2-CoV/COVID-19.  Hepatic steatosis. Partially imaged probable hemorrhagic cyst left kidney.  COVID19 PCR resulted positive. On second day of hospital stay patient was transferred ICU due to worsening hypoxia requiring NRB. After improvement in respiratory status the next day, patient was transferred back to general medicine floor. Patient was managed with supportive treatment including supplemental O2. RVP panel resulted as negative for co-infections. Urine legionella and strep pneumoniae were negative. Blood cultures x2 and urine cultures no growth to date. Patient was treated with plaquenil. Patient completed a course of azithromycin. Inflammatory markers (ferritin, ESR, CRP, LDH) were trended and improved. Procalcitonin was <.05. Troponins were negative x2. Patient's respiratory status was monitored closely and improved through out hospital course.         ---    CONSULTANTS:         Dr. Crockett - Pulmonology    Dr. Munguia - Infectious Disease        ---    TIME SPENT:    The total amount of time spent reviewing the hospital notes, laboratory values, imaging findings, assessing/counseling the patient, discussing with consultant physicians, social work, nursing staff was -- minutes        --- FROM ADMISSION H+P:     HPI:    58 y/o M with PMHx of HTN, DM2, BPH presented to the ED complaining of fever and shortness of breath past few days. He recently returned from [place] ([place] approximately one week ago - took chloroquine for his trip. He developed difficulty breathing and was seen in urgent care. At urgent care, noted to be hypoxic to 88% on room air with bilateral pneumonia and referred to the ED for further evaluation. He admits to dyspnea mostly with exertion. Denies chest pain, palpitations, abdominal pain, n/v/d. States that he is otherwise feeling well. Had no sick contacts. He states that his symptoms started about 6 days PTA. No other complaints.        In the ED, VSS although SpO2 91% on RA. Labs notable for: lymphopenia, normal WBC although with neutrophilic shift, H/H 12.6/37.4, Glucose 143, AST 79. CXR with bilateral infiltrates. EKG: Sinus tachycardia at 102bpm, no acute ST-T changes. Given rocephin and nick in ED. (21 Mar 2020 01:48)            ---    HOSPITAL COURSE:         Patient transferred to general medical floor for further management. Pulmonology, Dr. Arredondo and Infectious disease, Dr. Munguia consulted. CT chest showed Multifocal patchy airspace disease with associated groundglass opacities throughout the lung fields compatible with a multifocal pneumonia with consideration for viral pneumonia including SARS-2-CoV/COVID-19.  Hepatic steatosis. Partially imaged probable hemorrhagic cyst left kidney.  COVID19 PCR resulted positive. On second day of hospital stay patient was transferred ICU due to worsening hypoxia requiring NRB. After improvement in respiratory status the next day, patient was transferred back to general medicine floor. Patient was managed with supportive treatment including supplemental O2. RVP panel resulted as negative for co-infections. Urine legionella and strep pneumoniae were negative. Blood cultures x2 and urine cultures no growth to date. Patient was treated with plaquenil. Patient completed a course of azithromycin. Inflammatory markers (ferritin, ESR, CRP, LDH) were trended and improved. Procalcitonin was <.05. Troponins were negative x2. Patient noted to have runs of NSVT, 36 beats as the longest, overnight on telemonitor. Cardiology, Dr. Morris consulted. NSVT possibly related to COVID19 infection vs. CAD, more definitive workup to be done after discharge. Patient's respiratory status was monitored closely and improved through out hospital course.         ---    CONSULTANTS:         Dr. Crockett - Pulmonology    Dr. Munguia - Infectious Disease    Dr. Morris - Cardiology        ---    TIME SPENT:    The total amount of time spent reviewing the hospital notes, laboratory values, imaging findings, assessing/counseling the patient, discussing with consultant physicians, social work, nursing staff was -- minutes        --- FROM ADMISSION H+P:     HPI:    58 y/o M with PMHx of HTN, DM2, BPH presented to the ED complaining of fever and shortness of breath past few days. He recently returned from [place] ([place] approximately one week ago - took chloroquine for his trip. He developed difficulty breathing and was seen in urgent care. At urgent care, noted to be hypoxic to 88% on room air with bilateral pneumonia and referred to the ED for further evaluation. He admits to dyspnea mostly with exertion. Denies chest pain, palpitations, abdominal pain, n/v/d. States that he is otherwise feeling well. Had no sick contacts. He states that his symptoms started about 6 days PTA. No other complaints.        In the ED, VSS although SpO2 91% on RA. Labs notable for: lymphopenia, normal WBC although with neutrophilic shift, H/H 12.6/37.4, Glucose 143, AST 79. CXR with bilateral infiltrates. EKG: Sinus tachycardia at 102bpm, no acute ST-T changes. Given rocephin and nick in ED. (21 Mar 2020 01:48)            ---    HOSPITAL COURSE:         Patient transferred to general medical floor for further management. Pulmonology, Dr. Arredondo and Infectious disease, Dr. Munguia consulted. CT chest showed Multifocal patchy airspace disease with associated groundglass opacities throughout the lung fields compatible with a multifocal pneumonia with consideration for viral pneumonia including SARS-2-CoV/COVID-19.  Hepatic steatosis. Partially imaged probable hemorrhagic cyst left kidney.  COVID19 PCR resulted positive. On second day of hospital stay patient was transferred ICU due to worsening hypoxia requiring NRB. After improvement in respiratory status the next day, patient was transferred back to general medicine floor. Patient was managed with supportive treatment including supplemental O2. RVP panel resulted as negative for co-infections. Urine legionella and strep pneumoniae were negative. Blood cultures x2 and urine cultures no growth to date. Patient was treated with plaquenil. Patient completed a course of azithromycin. Inflammatory markers (ferritin, ESR, CRP, LDH) were trended and improved. Procalcitonin was <.05. Troponins were negative x2. Patient noted to have runs of NSVT, 36 beats as the longest, overnight on telemonitor. Cardiology, Dr. Morris consulted. NSVT possibly related to COVID19 infection vs. CAD, more definitive workup to be done after discharge. Echo showed EF 65%.  Patient's respiratory status was monitored closely and improved through out hospital course.         On day of discharge patient is afebrile, hemodynamically stable, and maintaining SpO2 in 90% on room air.          Vitals:  T: 98.4  P: 88  BP:  145/84  RR: 18  SpO2: 94% RA    GENERAL: NAD    HEENT: EOMI, hearing normal, conjunctiva and sclera clear    Chest: CTA bilaterally, no wheezing    CV: S1S2, RRR,     GI: soft, +BS, NT/ND    Musculoskeletal: no edema    Psychiatric: affect nL, mood nL    Skin: warm and dry        ---    CONSULTANTS:         Dr. Crockett - Pulmonology    Dr. Munguia - Infectious Disease    Dr. Morris - Cardiology        Completion of discharge in 35 minutes

## 2020-03-25 NOTE — DISCHARGE NOTE PROVIDER - NSDCCPCAREPLAN_GEN_ALL_CORE_FT
PRINCIPAL DISCHARGE DIAGNOSIS  Diagnosis: Pneumonia of both lungs due to infectious organism, unspecified part of lung  Assessment and Plan of Treatment:   You should follow the prevention steps below until a healthcare provider or local or state health department says you can return to your normal activities.  1. You should restrict activities outside your home, except for getting medical care.  2. Do not go to work, school, or public areas.  3. Avoid using public transportation, ride-sharing, or taxis.  4. Separate yourself from other people and animals in your home.  People: As much as possible, you should stay in a specific room and away from other people in your home. Also, you should use a separate bathroom, if available.  Animals: You should restrict contact with pets and other animals while you are sick with COVID19.   5.Call ahead before visiting your doctor.  6. Wear a facemask.  7. Cover your coughs and sneezes.  8. Clean your hands often. Wash your hands often with soap and water for at least 20 seconds, especially after blowing your nose, coughing, or sneezing; going to the bathroom; and before eating or preparing food. If soap and water are not readily available, use an alcohol-based hand  with at least 60% alcohol.  9. Avoid sharing personal household items.  You should not share dishes, drinking glasses, cups, eating utensils, towels, or bedding with other people or pets in your home.   10. Clean all “high-touch” surfaces everyday.  11. Monitor your symptoms. Seek prompt medical attention if your illness is worsening (e.g., difficulty breathing).   12. Discontinuing home isolation. Patients with confirmed COVID-19 should remain under home isolation precautions for 14 days since the positive COVID-19 test and until the risk of secondary transmission to others is thought to be low. The decision to discontinue home isolation precautions should be made on a  case-by-case basis, in consultation with healthcare providers and state and local health departments. [facility] Department of Health can be reached at [phone] for further information about COVID-19. PRINCIPAL DISCHARGE DIAGNOSIS  Diagnosis: SARS-associated coronavirus as cause of disease classified elsewhere  Assessment and Plan of Treatment: You were found to be positive for COVID 19 infection.  Please continue isolation precautions until fever free without the use of antipyretics for 72 hours, improvement of respiratory symptoms, and for atleast 7 days from on set of symptoms. PRINCIPAL DISCHARGE DIAGNOSIS  Diagnosis: SARS-associated coronavirus as cause of disease classified elsewhere  Assessment and Plan of Treatment: You were found to be positive for COVID 19 infection.  Please continue isolation precautions until fever free without the use of antipyretics for 72 hours, improvement of respiratory symptoms, and for atleast 7 days from on set of symptoms.      SECONDARY DISCHARGE DIAGNOSES  Diagnosis: NSVT (nonsustained ventricular tachycardia)  Assessment and Plan of Treatment: Continue metoprolol as prescribed.  Echocardiogram with EF 65%.  Follow up with Cardiology in 2-4 weeks.

## 2020-03-25 NOTE — PROGRESS NOTE ADULT - ASSESSMENT
60 y/o M with PMHx of HTN, DM2, BPH adm 3/21 recently returned from [place] ([place]   COVID-19---high risk period for decompensation  (7-14 days post symptom onset), avoid aerosolizing procedures,  steroids, NSAIDs ---no compelling     3/15 symptom onset so criticla window 3/22-3/29  3/23 N/L ratio 6.22/1.53 = 4.1  3/24 NLR  =7.3  3/25 NLR 6.32/1.31 = 4.9  and pt now on NC breathing comfortably    Pt has started to improve and fine to consider discharge planning with hope for early but safe discharge. Expedited discharge with home oxygen and even persistent fever at time of discharge is reasonable.  When patient stable for discharge per standard criteria from an ID standpoint  would consider patient potentially infectious at time of discharge and would discharge to home quarantine if possible. Patient will need to either be able return to an isolated situation where they can care for themselves or be with a care giver who has been previously exposed and/or is will to accept and mitigate the infection risk.   Since there are both test-based and non-test based approaches regarding recommendations regarding ending quarantine and these are rapidly changing, recommend this issue be deferred to patient's primary outpatient physician to address and for them to coordinate with [facility] (as per CDC guidelines which are being updated as more information becomes available).

## 2020-03-25 NOTE — PROGRESS NOTE ADULT - SUBJECTIVE AND OBJECTIVE BOX
CHIEF COMPLAINT/INTERVAL HISTORY:  Pt. seen and evaluated for COVID 19 infection.  Pt. is in no distress.  Reports respiratory status is better this week compared to last week.  On Nasal canula maintaining SpO2>/=93%    REVIEW OF SYSTEMS:  No fever, CP, or abdominal pain.     Vital Signs Last 24 Hrs  T(C): 37.1 (25 Mar 2020 09:15), Max: 37.1 (25 Mar 2020 09:15)  T(F): 98.8 (25 Mar 2020 09:15), Max: 98.8 (25 Mar 2020 09:15)  HR: 91 (25 Mar 2020 09:15) (86 - 91)  BP: 141/80 (25 Mar 2020 09:15) (138/81 - 148/70)  BP(mean): --  RR: 18 (25 Mar 2020 09:15) (17 - 18)  SpO2: 93% (25 Mar 2020 09:15) (93% - 98%)    PHYSICAL EXAM:  GENERAL: NAD  HEENT: EOMI, hearing normal, conjunctiva and sclera clear  Chest: Diminished BS bilaterally, no wheezing  CV: S1S2, RRR,   GI: soft, +BS, NT/ND  Musculoskeletal: no edema  Psychiatric: affect nL, mood nL  Skin: warm and dry    LABS:                        11.2   8.78  )-----------( 568      ( 25 Mar 2020 07:52 )             34.6     03-25    140  |  103  |  8   ----------------------------<  98  3.7   |  29  |  0.75    Ca    8.5      25 Mar 2020 07:52  Phos  3.3     03-25  Mg     2.1     03-25    TPro  7.0  /  Alb  2.4<L>  /  TBili  0.6  /  DBili  x   /  AST  91<H>  /  ALT  92<H>  /  AlkPhos  81  03-24    PT/INR - ( 24 Mar 2020 10:02 )   PT: 14.6 sec;   INR: 1.29 ratio         PTT - ( 24 Mar 2020 10:02 )  PTT:30.1 sec      Assessment and Plan:  -Acute hypoxic respiratory failure 2/2 COVID 19 infection:  RVP negative.  Blood cx NGTD.  Continue Tylenol 1000mg PO Q6h PRN and Guaifenesin 200mg PO Q6h PRN.  O2 support via nasal canula.  Avoid HFNC/NIPPV/Nebulizer tx.  Pulmonary and ID f/u  -HTN:  Continue metoprolol 25mg PO BID and Norvasc 10mg PO daily  -Anemia:  stable with no gross bleeding.  Will continue to monitor H/H  -Type 2 DM:  continue low dose humalog sliding scale  -BPH:  continue Flomax 0.8mg PO QHS  -HLD:  continue Lipitor 10mg PO QHS  -VTE ppx:  Lovenox 40mg SQ QHS

## 2020-03-25 NOTE — CHART NOTE - NSCHARTNOTEFT_GEN_A_CORE
Called by RN for pt with 4 beats if vtach. Pt asymptomatic and sleeping. AM mag and phos ordered.    -RN to call with changes    -lorraine  [email or web address]

## 2020-03-25 NOTE — PROGRESS NOTE ADULT - SUBJECTIVE AND OBJECTIVE BOX
infectious diseases progress note:    АЛЕКСАНДР CRAWFORD is a 59y y. o. Male patient    COVID Patient    Allergies    No Known Allergies    Intolerances        ANTIBIOTICS/RELEVANT:  antimicrobials    immunologic:    OTHER:  acetaminophen   Tablet .. 1000 milliGRAM(s) Oral every 6 hours PRN  ALBUTerol    90 MICROgram(s) HFA Inhaler 2 Puff(s) Inhalation every 6 hours PRN  amLODIPine   Tablet 10 milliGRAM(s) Oral daily  atorvastatin 10 milliGRAM(s) Oral at bedtime  dextrose 40% Gel 15 Gram(s) Oral once PRN  dextrose 5%. 1000 milliLiter(s) IV Continuous <Continuous>  dextrose 50% Injectable 12.5 Gram(s) IV Push once  dextrose 50% Injectable 25 Gram(s) IV Push once  dextrose 50% Injectable 25 Gram(s) IV Push once  enoxaparin Injectable 40 milliGRAM(s) SubCutaneous at bedtime  glucagon  Injectable 1 milliGRAM(s) IntraMuscular once PRN  guaiFENesin   Syrup  (Sugar-Free) 200 milliGRAM(s) Oral every 6 hours PRN  insulin lispro (HumaLOG) corrective regimen sliding scale   SubCutaneous three times a day before meals  insulin lispro (HumaLOG) corrective regimen sliding scale   SubCutaneous at bedtime  melatonin 5 milliGRAM(s) Oral at bedtime  metoprolol tartrate 25 milliGRAM(s) Oral two times a day  saccharomyces boulardii 250 milliGRAM(s) Oral two times a day  tamsulosin 0.8 milliGRAM(s) Oral at bedtime      Objective:  Vital Signs Last 24 Hrs  T(C): 37.1 (25 Mar 2020 09:15), Max: 37.1 (25 Mar 2020 09:15)  T(F): 98.8 (25 Mar 2020 09:15), Max: 98.8 (25 Mar 2020 09:15)  HR: 91 (25 Mar 2020 09:15) (86 - 91)  BP: 141/80 (25 Mar 2020 09:15) (138/81 - 148/70)  BP(mean): --  RR: 18 (25 Mar 2020 09:15) (17 - 18)  SpO2: 93% (25 Mar 2020 09:15) (93% - 98%)    T(C): 37.1 (03-25-20 @ 09:15), Max: 38 (03-24-20 @ 01:10)  T(C): 37.1 (03-25-20 @ 09:15), Max: 38 (03-24-20 @ 01:10)  T(C): 37.1 (03-25-20 @ 09:15), Max: 38.3 (03-22-20 @ 00:53)    PHYSICAL EXAM:  HEENT: NC atraumatic  Neck: supple  Respiratory: no accessory muscle use, breathing comfortably  Cardiovascular: distant  Gastrointestinal: normal appearing, nondistended  Extremities: no clubbing, no cyanosis,      LABS:                          11.2   8.78  )-----------( 568      ( 25 Mar 2020 07:52 )             34.6       8.78 03-25 @ 07:52  9.52 03-24 @ 10:02  8.47 03-23 @ 06:22  9.66 03-22 @ 09:28  8.97 03-21 @ 08:38  10.35 03-21 @ 01:11      03-25    140  |  103  |  8   ----------------------------<  98  3.7   |  29  |  0.75    Ca    8.5      25 Mar 2020 07:52  Phos  3.3     03-25  Mg     2.1     03-25    TPro  7.0  /  Alb  2.4<L>  /  TBili  0.6  /  DBili  x   /  AST  91<H>  /  ALT  92<H>  /  AlkPhos  81  03-24      Creatinine, Serum: 0.75 mg/dL (03-25-20 @ 07:52)  Creatinine, Serum: 0.80 mg/dL (03-24-20 @ 10:02)  Creatinine, Serum: 0.83 mg/dL (03-23-20 @ 06:22)  Creatinine, Serum: 0.96 mg/dL (03-22-20 @ 09:28)  Creatinine, Serum: 0.95 mg/dL (03-21-20 @ 08:38)  Creatinine, Serum: 0.97 mg/dL (03-21-20 @ 01:11)      PT/INR - ( 24 Mar 2020 10:02 )   PT: 14.6 sec;   INR: 1.29 ratio         PTT - ( 24 Mar 2020 10:02 )  PTT:30.1 sec          COVID RISK SCORE  Auto Neutrophil #: 6.32 K/uL (03-25-20 @ 07:52)  Auto Lymphocyte #: 1.31 K/uL (03-25-20 @ 07:52)  Auto Neutrophil #: 7.55 K/uL (03-24-20 @ 10:02)  Auto Lymphocyte #: 1.04 K/uL (03-24-20 @ 10:02)  Auto Neutrophil #: 6.22 K/uL (03-23-20 @ 06:22)  Auto Lymphocyte #: 1.53 K/uL (03-23-20 @ 06:22)  Auto Neutrophil #: 8.22 K/uL (03-22-20 @ 09:28)  Auto Lymphocyte #: 1.00 K/uL (03-22-20 @ 09:28)  Auto Neutrophil #: 7.50 K/uL (03-21-20 @ 08:38)  Auto Lymphocyte #: 1.09 K/uL (03-21-20 @ 08:38)  Auto Neutrophil #: 9.22 K/uL (03-21-20 @ 01:11)  Auto Lymphocyte #: 0.63 K/uL (03-21-20 @ 01:11)    Lactate, Blood: 1.2 mmol/L (03-24-20 @ 09:58)  Lactate, Blood: 1.1 mmol/L (03-21-20 @ 01:11)    Auto Eosinophil #: 0.11 K/uL (03-25-20 @ 07:52)  Auto Eosinophil #: 0.07 K/uL (03-24-20 @ 10:02)  Auto Eosinophil #: 0.06 K/uL (03-23-20 @ 06:22)  Auto Eosinophil #: 0.01 K/uL (03-22-20 @ 09:28)  Auto Eosinophil #: 0.01 K/uL (03-21-20 @ 08:38)  Auto Eosinophil #: 0.00 K/uL (03-21-20 @ 01:11)    Lactate Dehydrogenase, Serum: 527 U/L (03-24-20 @ 16:09)  Lactate Dehydrogenase, Serum: 480 U/L (03-23-20 @ 09:06)  Lactate Dehydrogenase, Serum: 567 U/L (03-21-20 @ 14:56)  Lactate Dehydrogenase, Serum: 543 U/L (03-21-20 @ 14:55)    Sedimentation Rate, Erythrocyte: 34 mm/hr (03-25-20 @ 07:52)  Sedimentation Rate, Erythrocyte: 43 mm/hr (03-24-20 @ 10:02)  Sedimentation Rate, Erythrocyte: 28 mm/hr (03-23-20 @ 06:22)  Sedimentation Rate, Erythrocyte: 14 mm/hr (03-21-20 @ 08:38)    Procalcitonin, Serum: 0.21 ng/mL (03-25-20 @ 07:52)  Procalcitonin, Serum: 0.26 ng/mL (03-24-20 @ 10:02)  Procalcitonin, Serum: 0.32 ng/mL (03-21-20 @ 08:38)    Troponin I, Serum: <.015 ng/mL (03-24-20 @ 10:02)  Troponin I, Serum: <.015 ng/mL (03-23-20 @ 06:22)  Troponin I, Serum: <.015 ng/mL (03-21-20 @ 01:11)    Creatine Kinase, Serum: 77 U/L (03-24-20 @ 10:02)  Creatine Kinase, Serum: 162 U/L (03-21-20 @ 08:38)        Ferritin, Serum: 791 ng/mL (03-25-20 @ 11:15)  Ferritin, Serum: 834 ng/mL (03-24-20 @ 16:10)  Ferritin, Serum: 668 ng/mL (03-23-20 @ 09:07)  Ferritin, Serum: 579 ng/mL (03-21-20 @ 14:58)        INR: 1.29 ratio (03-24-20 @ 10:02)  Activated Partial Thromboplastin Time: 30.1 sec (03-24-20 @ 10:02)  INR: 1.23 ratio (03-21-20 @ 08:38)  Activated Partial Thromboplastin Time: 30.0 sec (03-21-20 @ 08:38)    D-Dimer Assay, Quantitative: 497 ng/mL DDU (03-25-20 @ 07:52)  D-Dimer Assay, Quantitative: 522 ng/mL DDU (03-24-20 @ 10:02)  D-Dimer Assay, Quantitative: 339 ng/mL DDU (03-22-20 @ 09:28)        MICROBIOLOGY:              RADIOLOGY & ADDITIONAL STUDIES:

## 2020-03-25 NOTE — PROVIDER CONTACT NOTE (OTHER) - ASSESSMENT
appears asleep
Patient alert oriented x4 but having trouble maintaining sats.  Patient having difficulty breathing at rest
Patient has  cough.  Patient sats 88% on 3 liters nasal canula 89% on 4 liters 92% on 4 liters nasal canula

## 2020-03-25 NOTE — DISCHARGE NOTE PROVIDER - CARE PROVIDER_API CALL
James Zuleta (MD)  Infectious Disease; Internal Medicine  [address], [address] [place] [address]  Phone: [phone]  Fax: [phone]  Follow Up Time:     Marcio Amezcua)  Cardiovascular Disease; Internal Medicine  [address] [place] [address]  Phone: [phone]  Fax: [phone]  Follow Up Time:

## 2020-03-26 LAB
ANION GAP SERPL CALC-SCNC: 7 MMOL/L — SIGNIFICANT CHANGE UP (ref 5–17)
BASOPHILS # BLD AUTO: 0.04 K/UL — SIGNIFICANT CHANGE UP (ref 0–0.2)
BASOPHILS NFR BLD AUTO: 0.5 % — SIGNIFICANT CHANGE UP (ref 0–2)
BUN SERPL-MCNC: 10 MG/DL — SIGNIFICANT CHANGE UP (ref 7–23)
CALCIUM SERPL-MCNC: 8.8 MG/DL — SIGNIFICANT CHANGE UP (ref 8.5–10.1)
CHLORIDE SERPL-SCNC: 105 MMOL/L — SIGNIFICANT CHANGE UP (ref 96–108)
CK MB BLD-MCNC: <2 % — SIGNIFICANT CHANGE UP (ref 0–3.5)
CK MB CFR SERPL CALC: <1 NG/ML — SIGNIFICANT CHANGE UP (ref 0–3.6)
CK SERPL-CCNC: 50 U/L — SIGNIFICANT CHANGE UP (ref 26–308)
CO2 SERPL-SCNC: 28 MMOL/L — SIGNIFICANT CHANGE UP (ref 22–31)
CREAT SERPL-MCNC: 0.72 MG/DL — SIGNIFICANT CHANGE UP (ref 0.5–1.3)
CRP SERPL-MCNC: 6.18 MG/DL — HIGH (ref 0–0.4)
CULTURE RESULTS: SIGNIFICANT CHANGE UP
CULTURE RESULTS: SIGNIFICANT CHANGE UP
EOSINOPHIL # BLD AUTO: 0.13 K/UL — SIGNIFICANT CHANGE UP (ref 0–0.5)
EOSINOPHIL NFR BLD AUTO: 1.6 % — SIGNIFICANT CHANGE UP (ref 0–6)
ERYTHROCYTE [SEDIMENTATION RATE] IN BLOOD: 65 MM/HR — HIGH (ref 0–20)
FERRITIN SERPL-MCNC: 767 NG/ML — HIGH (ref 30–400)
GLUCOSE SERPL-MCNC: 116 MG/DL — HIGH (ref 70–99)
HCT VFR BLD CALC: 37.2 % — LOW (ref 39–50)
HGB BLD-MCNC: 12.2 G/DL — LOW (ref 13–17)
IMM GRANULOCYTES NFR BLD AUTO: 2 % — HIGH (ref 0–1.5)
LYMPHOCYTES # BLD AUTO: 1.21 K/UL — SIGNIFICANT CHANGE UP (ref 1–3.3)
LYMPHOCYTES # BLD AUTO: 15.2 % — SIGNIFICANT CHANGE UP (ref 13–44)
MAGNESIUM SERPL-MCNC: 2.1 MG/DL — SIGNIFICANT CHANGE UP (ref 1.6–2.6)
MAGNESIUM SERPL-MCNC: 2.2 MG/DL — SIGNIFICANT CHANGE UP (ref 1.6–2.6)
MCHC RBC-ENTMCNC: 25.1 PG — LOW (ref 27–34)
MCHC RBC-ENTMCNC: 32.8 GM/DL — SIGNIFICANT CHANGE UP (ref 32–36)
MCV RBC AUTO: 76.4 FL — LOW (ref 80–100)
MONOCYTES # BLD AUTO: 0.65 K/UL — SIGNIFICANT CHANGE UP (ref 0–0.9)
MONOCYTES NFR BLD AUTO: 8.1 % — SIGNIFICANT CHANGE UP (ref 2–14)
NEUTROPHILS # BLD AUTO: 5.79 K/UL — SIGNIFICANT CHANGE UP (ref 1.8–7.4)
NEUTROPHILS NFR BLD AUTO: 72.6 % — SIGNIFICANT CHANGE UP (ref 43–77)
NRBC # BLD: 0 /100 WBCS — SIGNIFICANT CHANGE UP (ref 0–0)
PHOSPHATE SERPL-MCNC: 3.3 MG/DL — SIGNIFICANT CHANGE UP (ref 2.5–4.5)
PLATELET # BLD AUTO: 651 K/UL — HIGH (ref 150–400)
POTASSIUM SERPL-MCNC: 3.9 MMOL/L — SIGNIFICANT CHANGE UP (ref 3.5–5.3)
POTASSIUM SERPL-SCNC: 3.9 MMOL/L — SIGNIFICANT CHANGE UP (ref 3.5–5.3)
RBC # BLD: 4.87 M/UL — SIGNIFICANT CHANGE UP (ref 4.2–5.8)
RBC # FLD: 14.3 % — SIGNIFICANT CHANGE UP (ref 10.3–14.5)
SODIUM SERPL-SCNC: 140 MMOL/L — SIGNIFICANT CHANGE UP (ref 135–145)
SPECIMEN SOURCE: SIGNIFICANT CHANGE UP
SPECIMEN SOURCE: SIGNIFICANT CHANGE UP
TROPONIN I SERPL-MCNC: <.015 NG/ML — SIGNIFICANT CHANGE UP (ref 0.01–0.04)
WBC # BLD: 7.98 K/UL — SIGNIFICANT CHANGE UP (ref 3.8–10.5)
WBC # FLD AUTO: 7.98 K/UL — SIGNIFICANT CHANGE UP (ref 3.8–10.5)

## 2020-03-26 PROCEDURE — 99233 SBSQ HOSP IP/OBS HIGH 50: CPT

## 2020-03-26 RX ADMIN — Medication 25 MILLIGRAM(S): at 05:57

## 2020-03-26 RX ADMIN — Medication 250 MILLIGRAM(S): at 05:57

## 2020-03-26 RX ADMIN — AMLODIPINE BESYLATE 10 MILLIGRAM(S): 2.5 TABLET ORAL at 05:57

## 2020-03-26 RX ADMIN — Medication 5 MILLIGRAM(S): at 21:32

## 2020-03-26 RX ADMIN — TAMSULOSIN HYDROCHLORIDE 0.8 MILLIGRAM(S): 0.4 CAPSULE ORAL at 21:32

## 2020-03-26 RX ADMIN — ENOXAPARIN SODIUM 40 MILLIGRAM(S): 100 INJECTION SUBCUTANEOUS at 21:32

## 2020-03-26 RX ADMIN — Medication 25 MILLIGRAM(S): at 17:43

## 2020-03-26 RX ADMIN — ATORVASTATIN CALCIUM 10 MILLIGRAM(S): 80 TABLET, FILM COATED ORAL at 21:32

## 2020-03-26 RX ADMIN — Medication 250 MILLIGRAM(S): at 17:43

## 2020-03-26 NOTE — CHART NOTE - NSCHARTNOTEFT_GEN_A_CORE
Called by RN because patient had 39 beats of Vtach on remote tele. Patient is 60 yo M admitted for acute hypoxic respiratory failure 2/2 COVID 19 infection. Patient seen at bedside. Denies chest pain, palpitations, SOB, dizziness. Spoke with tele tech, patient currently in NSR on monitor    Vital Signs Last 24 Hrs  T(C): 36.8 (26 Mar 2020 16:49), Max: 37.2 (26 Mar 2020 00:00)  T(F): 98.3 (26 Mar 2020 16:49), Max: 99 (26 Mar 2020 00:00)  HR: 100 (26 Mar 2020 16:49) (84 - 100)  BP: 137/81 (26 Mar 2020 16:49) (135/88 - 163/86)  BP(mean): --  RR: 17 (26 Mar 2020 16:49) (17 - 18)  SpO2: 90% (26 Mar 2020 16:49) (90% - 98%)    PHYSICAL EXAM:  GENERAL: NAD  HEAD:  Atraumatic, Normocephalic  CHEST/LUNG: Coarse breath sounds in diffuse lung fields  HEART: Regular rate and rhythm; No murmurs, rubs, or gallops  ABDOMEN: Bowel sounds present; Soft, Nontender, Nondistended. No hepatomegally  EXTREMITIES:  2+ Peripheral Pulses, No clubbing, cyanosis, or edema  NERVOUS SYSTEM:  Alert & Oriented X3, speech clear. No deficits     A/P  60 YO M admitted for acute hypoxic respiratory 2/2 covid 19. Now with 39 beats of Vtach on monitor likely 2/2 acute illness, asymptomatic.  12 lead EKG in NSR  follow up stat cardiac enzymes  follow up stat  magnesium, phos  RN to call with changes

## 2020-03-26 NOTE — PROVIDER CONTACT NOTE (OTHER) - ACTION/TREATMENT ORDERED:
will continue remote telemetry
Dr aware.  to place orders.
MD ordering 40% venti.  change patients oxygen.  cont to assess
cont to assess

## 2020-03-26 NOTE — PROGRESS NOTE ADULT - SUBJECTIVE AND OBJECTIVE BOX
CHIEF COMPLAINT/INTERVAL HISTORY:  Pt. seen and evaluated for COVID 19 infection.  Pt. is in no distress.  Feeling well.  Denies any SOB.  Afebrile.     REVIEW OF SYSTEMS:  No fever, CP, SOB, or abdominal pain.     Vital Signs Last 24 Hrs  T(C): 37.2 (26 Mar 2020 00:00), Max: 37.2 (26 Mar 2020 00:00)  T(F): 99 (26 Mar 2020 00:00), Max: 99 (26 Mar 2020 00:00)  HR: 86 (26 Mar 2020 05:45) (84 - 88)  BP: 152/78 (26 Mar 2020 05:45) (134/76 - 163/86)  BP(mean): --  RR: 18 (26 Mar 2020 00:00) (18 - 18)  SpO2: 98% (26 Mar 2020 00:00) (91% - 98%)    PHYSICAL EXAM:  GENERAL: NAD  HEENT: EOMI, hearing normal, conjunctiva and sclera clear  Chest: Diminished BS bilaterally, no wheezing  CV: S1S2, RRR,   GI: soft, +BS, NT/ND  Musculoskeletal: no edema  Psychiatric: affect nL, mood nL  Skin: warm and dry    LABS:                        11.2   8.78  )-----------( 568      ( 25 Mar 2020 07:52 )             34.6     03-25    140  |  103  |  8   ----------------------------<  98  3.7   |  29  |  0.75    Ca    8.5      25 Mar 2020 07:52  Phos  3.3     03-25  Mg     2.1     03-25    TPro  7.0  /  Alb  2.4<L>  /  TBili  0.6  /  DBili  x   /  AST  91<H>  /  ALT  92<H>  /  AlkPhos  81  03-24    PT/INR - ( 24 Mar 2020 10:02 )   PT: 14.6 sec;   INR: 1.29 ratio         PTT - ( 24 Mar 2020 10:02 )  PTT:30.1 sec      Assessment and Plan:  -Acute hypoxic respiratory failure 2/2 COVID 19 infection:  RVP negative.  Blood cx NGTD.  Continue Tylenol 1000mg PO Q6h PRN and Guaifenesin 200mg PO Q6h PRN.  Titrate off O2 and check room air SpO2.  Avoid HFNC/NIPPV/Nebulizer tx.  Pulmonary and ID f/u  -HTN:  Continue metoprolol 25mg PO BID and Norvasc 10mg PO daily  -Anemia:  stable with no gross bleeding.  Will continue to monitor H/H  -Type 2 DM:  continue low dose humalog sliding scale  -BPH:  continue Flomax 0.8mg PO QHS  -HLD:  continue Lipitor 10mg PO QHS  -VTE ppx:  Lovenox 40mg SQ QHS

## 2020-03-27 LAB
ANION GAP SERPL CALC-SCNC: 7 MMOL/L — SIGNIFICANT CHANGE UP (ref 5–17)
BASOPHILS # BLD AUTO: 0.03 K/UL — SIGNIFICANT CHANGE UP (ref 0–0.2)
BASOPHILS NFR BLD AUTO: 0.4 % — SIGNIFICANT CHANGE UP (ref 0–2)
BUN SERPL-MCNC: 10 MG/DL — SIGNIFICANT CHANGE UP (ref 7–23)
CALCIUM SERPL-MCNC: 8.9 MG/DL — SIGNIFICANT CHANGE UP (ref 8.5–10.1)
CHLORIDE SERPL-SCNC: 108 MMOL/L — SIGNIFICANT CHANGE UP (ref 96–108)
CO2 SERPL-SCNC: 27 MMOL/L — SIGNIFICANT CHANGE UP (ref 22–31)
CREAT SERPL-MCNC: 0.73 MG/DL — SIGNIFICANT CHANGE UP (ref 0.5–1.3)
CRP SERPL-MCNC: 3.25 MG/DL — HIGH (ref 0–0.4)
EOSINOPHIL # BLD AUTO: 0.11 K/UL — SIGNIFICANT CHANGE UP (ref 0–0.5)
EOSINOPHIL NFR BLD AUTO: 1.3 % — SIGNIFICANT CHANGE UP (ref 0–6)
ERYTHROCYTE [SEDIMENTATION RATE] IN BLOOD: 57 MM/HR — HIGH (ref 0–20)
FERRITIN SERPL-MCNC: 667 NG/ML — HIGH (ref 30–400)
GLUCOSE SERPL-MCNC: 100 MG/DL — HIGH (ref 70–99)
HCT VFR BLD CALC: 36.1 % — LOW (ref 39–50)
HGB BLD-MCNC: 11.7 G/DL — LOW (ref 13–17)
IMM GRANULOCYTES NFR BLD AUTO: 2.2 % — HIGH (ref 0–1.5)
LYMPHOCYTES # BLD AUTO: 1.35 K/UL — SIGNIFICANT CHANGE UP (ref 1–3.3)
LYMPHOCYTES # BLD AUTO: 16.5 % — SIGNIFICANT CHANGE UP (ref 13–44)
MAGNESIUM SERPL-MCNC: 2.1 MG/DL — SIGNIFICANT CHANGE UP (ref 1.6–2.6)
MCHC RBC-ENTMCNC: 24.8 PG — LOW (ref 27–34)
MCHC RBC-ENTMCNC: 32.4 GM/DL — SIGNIFICANT CHANGE UP (ref 32–36)
MCV RBC AUTO: 76.5 FL — LOW (ref 80–100)
MONOCYTES # BLD AUTO: 0.71 K/UL — SIGNIFICANT CHANGE UP (ref 0–0.9)
MONOCYTES NFR BLD AUTO: 8.7 % — SIGNIFICANT CHANGE UP (ref 2–14)
NEUTROPHILS # BLD AUTO: 5.81 K/UL — SIGNIFICANT CHANGE UP (ref 1.8–7.4)
NEUTROPHILS NFR BLD AUTO: 70.9 % — SIGNIFICANT CHANGE UP (ref 43–77)
NRBC # BLD: 0 /100 WBCS — SIGNIFICANT CHANGE UP (ref 0–0)
PLATELET # BLD AUTO: 635 K/UL — HIGH (ref 150–400)
POTASSIUM SERPL-MCNC: 4.2 MMOL/L — SIGNIFICANT CHANGE UP (ref 3.5–5.3)
POTASSIUM SERPL-SCNC: 4.2 MMOL/L — SIGNIFICANT CHANGE UP (ref 3.5–5.3)
RBC # BLD: 4.72 M/UL — SIGNIFICANT CHANGE UP (ref 4.2–5.8)
RBC # FLD: 14.1 % — SIGNIFICANT CHANGE UP (ref 10.3–14.5)
SODIUM SERPL-SCNC: 142 MMOL/L — SIGNIFICANT CHANGE UP (ref 135–145)
WBC # BLD: 8.19 K/UL — SIGNIFICANT CHANGE UP (ref 3.8–10.5)
WBC # FLD AUTO: 8.19 K/UL — SIGNIFICANT CHANGE UP (ref 3.8–10.5)

## 2020-03-27 PROCEDURE — 99233 SBSQ HOSP IP/OBS HIGH 50: CPT

## 2020-03-27 PROCEDURE — 99223 1ST HOSP IP/OBS HIGH 75: CPT

## 2020-03-27 PROCEDURE — 93010 ELECTROCARDIOGRAM REPORT: CPT

## 2020-03-27 RX ORDER — METOPROLOL TARTRATE 50 MG
50 TABLET ORAL
Refills: 0 | Status: DISCONTINUED | OUTPATIENT
Start: 2020-03-27 | End: 2020-03-29

## 2020-03-27 RX ADMIN — Medication 50 MILLIGRAM(S): at 17:27

## 2020-03-27 RX ADMIN — ATORVASTATIN CALCIUM 10 MILLIGRAM(S): 80 TABLET, FILM COATED ORAL at 21:31

## 2020-03-27 RX ADMIN — Medication 5 MILLIGRAM(S): at 21:31

## 2020-03-27 RX ADMIN — TAMSULOSIN HYDROCHLORIDE 0.8 MILLIGRAM(S): 0.4 CAPSULE ORAL at 21:31

## 2020-03-27 RX ADMIN — Medication 25 MILLIGRAM(S): at 05:49

## 2020-03-27 RX ADMIN — ENOXAPARIN SODIUM 40 MILLIGRAM(S): 100 INJECTION SUBCUTANEOUS at 21:32

## 2020-03-27 RX ADMIN — Medication 250 MILLIGRAM(S): at 05:49

## 2020-03-27 RX ADMIN — Medication 250 MILLIGRAM(S): at 17:27

## 2020-03-27 RX ADMIN — AMLODIPINE BESYLATE 10 MILLIGRAM(S): 2.5 TABLET ORAL at 05:49

## 2020-03-27 NOTE — CONSULT NOTE ADULT - REASON FOR ADMISSION
hypoxic respiratory failure; r/o COVID-19; bilateral pneumonia

## 2020-03-27 NOTE — CONSULT NOTE ADULT - SUBJECTIVE AND OBJECTIVE BOX
[facility] - Varinder Walker, Arturo Green, Syed, Ihsan, Cele Cr  Office Number: [phone]    Initial Consult Note: This is a 58 y/o M HTN, DM2, HLD, and cardiomegaly presented with respiratory symptoms along with fever, found to have COVID 19 after a recent travel to [place].    CHIEF COMPLAINT: Patient is a 59y old  Male who presents with a chief complaint of hypoxic respiratory failure; r/o COVID-19; bilateral pneumonia (27 Mar 2020 10:16)    HPI:  58 y/o M with PMHx of HTN, DM2, BPH presented to the ED complaining of fever and shortness of breath past few days. He recently returned from [place] ([facility] and [place]) approximately one week ago - took chloroquine for his trip. He developed difficulty breathing and was seen in urgent care. At urgent care, noted to be hypoxic to 88% on room air with bilateral pneumonia and referred to the ED for further evaluation. He admits to dyspnea mostly with exertion. Denies chest pain, palpitations, abdominal pain, n/v/d. States that he is otherwise feeling well. Had no sick contacts. He states that his symptoms started about 6 days PTA. No other complaints.    In the ED, VSS although SpO2 91% on RA. Labs notable for: lymphopenia, normal WBC although with neutrophilic shift, H/H 12.6/37.4, Glucose 143, AST 79. CXR with bilateral infiltrates. EKG: Sinus tachycardia at 102bpm, no acute ST-T changes. Given rocephin and azithro in ED. (21 Mar 2020 01:48)    PAST MEDICAL & SURGICAL HISTORY:  Gastric ulcer  Dyspnea on exertion  Cardiomegaly  HLD (hyperlipidemia)  Type 2 diabetes mellitus  BPH (benign prostatic hyperplasia)  HTN (hypertension)  History of endoscopy    SOCIAL HISTORY:  No tobacco, ethanol, or drug abuse.  FAMILY HISTORY:  FH: CAD (coronary artery disease): sister (with hx of coronary stent) and father (with history of CABG)  Family history of CABG: father    No family history of acute MI or sudden cardiac death.  MEDICATIONS  (STANDING):  amLODIPine   Tablet 10 milliGRAM(s) Oral daily  atorvastatin 10 milliGRAM(s) Oral at bedtime  dextrose 5%. 1000 milliLiter(s) (50 mL/Hr) IV Continuous <Continuous>  dextrose 50% Injectable 12.5 Gram(s) IV Push once  dextrose 50% Injectable 25 Gram(s) IV Push once  dextrose 50% Injectable 25 Gram(s) IV Push once  enoxaparin Injectable 40 milliGRAM(s) SubCutaneous at bedtime  insulin lispro (HumaLOG) corrective regimen sliding scale   SubCutaneous three times a day before meals  insulin lispro (HumaLOG) corrective regimen sliding scale   SubCutaneous at bedtime  melatonin 5 milliGRAM(s) Oral at bedtime  metoprolol tartrate 25 milliGRAM(s) Oral two times a day  saccharomyces boulardii 250 milliGRAM(s) Oral two times a day  tamsulosin 0.8 milliGRAM(s) Oral at bedtime    MEDICATIONS  (PRN):  acetaminophen   Tablet .. 1000 milliGRAM(s) Oral every 6 hours PRN Temp greater or equal to 38C (100.4F), Mild Pain (1 - 3)  ALBUTerol    90 MICROgram(s) HFA Inhaler 2 Puff(s) Inhalation every 6 hours PRN Shortness of Breath and/or Wheezing  dextrose 40% Gel 15 Gram(s) Oral once PRN Blood Glucose LESS THAN 70 milliGRAM(s)/deciliter  glucagon  Injectable 1 milliGRAM(s) IntraMuscular once PRN Glucose LESS THAN 70 milligrams/deciliter  guaiFENesin   Syrup  (Sugar-Free) 200 milliGRAM(s) Oral every 6 hours PRN Cough    Allergies    No Known Allergies    Intolerances    REVIEW OF SYSTEMS:  CONSTITUTIONAL: No weakness, fevers or chills  EYES/ENT: No visual changes;  No vertigo or throat pain   NECK: No pain or stiffness  RESPIRATORY: No cough, wheezing, hemoptysis; No shortness of breath  CARDIOVASCULAR: No chest pain or palpitations  GASTROINTESTINAL: No abdominal pain. No nausea, vomiting, or hematemesis; No diarrhea or constipation. No melena or hematochezia.  GENITOURINARY: No dysuria, frequency or hematuria  NEUROLOGICAL: No numbness or weakness  SKIN: No itching or rash  All other review of systems is negative unless indicated above  VITAL SIGNS:   Vital Signs Last 24 Hrs  T(C): 36.8 (27 Mar 2020 10:25), Max: 37.1 (27 Mar 2020 05:43)  T(F): 98.3 (27 Mar 2020 10:25), Max: 98.7 (27 Mar 2020 05:43)  HR: 83 (27 Mar 2020 10:25) (83 - 100)  BP: 116/77 (27 Mar 2020 10:25) (116/77 - 159/77)  BP(mean): --  RR: 17 (27 Mar 2020 10:25) (17 - 18)  SpO2: 96% (27 Mar 2020 10:25) (90% - 96%)  I&O's Summary    On Exam:  Constitutional: NAD, alert and oriented x 3  Lungs:  Non-labored, breath sounds are clear bilaterally, No wheezing, rales or rhonchi  Cardiovascular: RRR.  S1 and S2 positive.  No murmurs, rubs, gallops or clicks  Gastrointestinal: Bowel Sounds present, soft, nontender.   Lymph: No peripheral edema. No cervical lymphadenopathy.  Neurological: Alert, no focal deficits  Skin: No rashes or ulcers   Psych:  Mood & affect appropriate.    LABS: All Labs Reviewed:                        11.7   8.19  )-----------( 635      ( 27 Mar 2020 09:38 )             36.1                         12.2   7.98  )-----------( 651      ( 26 Mar 2020 10:21 )             37.2                         11.2   8.78  )-----------( 568      ( 25 Mar 2020 07:52 )             34.6     27 Mar 2020 09:38    142    |  108    |  10     ----------------------------<  100    4.2     |  27     |  0.73   26 Mar 2020 10:21    140    |  105    |  10     ----------------------------<  116    3.9     |  28     |  0.72   25 Mar 2020 07:52    140    |  103    |  8      ----------------------------<  98     3.7     |  29     |  0.75     Ca    8.9        27 Mar 2020 09:38  Ca    8.8        26 Mar 2020 10:21  Ca    8.5        25 Mar 2020 07:52  Phos  3.3       26 Mar 2020 22:55  Phos  3.3       25 Mar 2020 07:52  Mg     2.1       27 Mar 2020 09:38  Mg     2.2       26 Mar 2020 22:55  Mg     2.1       26 Mar 2020 10:21        CARDIAC MARKERS ( 26 Mar 2020 22:55 )  <.015 ng/mL / x     / 50 U/L / x     / <1.0 ng/mL    Blood Culture:   RADIOLOGY:  < from: CT Chest No Cont (03.21.20 @ 04:50) >    EXAM:  CT CHEST                            PROCEDURE DATE:  03/21/2020          INTERPRETATION:  Exam: CT Chest Without Contrast   Exam date and time: 3/21/2020 4:27 AM   Age: 59 years old   Clinical indication: Shortness of breath     TECHNIQUE:   Imaging protocol: Computed tomography of the chest without contrast.     COMPARISON:   DX XR CHEST PA AND LATERAL 3/21/2020 12:26 AM     FINDINGS:     Lungs: Central tracheobronchial tree is grossly patent. No endobronchial lesions. Multifocal patchyairspace disease with associated groundglass opacities throughout the lung fields compatible with a multifocal pneumonia with consideration for viral pneumonia including SARS-2-CoV/COVID-19. No pleural effusion.  Pleural space: Unremarkable. No pneumothorax. No pleural effusion.   Heart: Mild cardiomegaly.   Aorta: No aortic aneurysm.   Lymph nodes: Small reactive mediastinal lymph nodes.   Liver: Diffuse hepatic steatosis.   Kidneys and ureters: Hyperdense probable hemorrhagic cyst left kidney. This measures 2 cm.   Bones/joints: Degenerative changes. No acute fracture.   Soft tissues: Unremarkable.     IMPRESSION:     1. Multifocal patchy airspace disease with associated groundglass opacities throughout the lung fields compatible with a multifocal pneumonia with consideration for viral pneumonia including SARS-2-CoV/COVID-19.     2. Hepatic steatosis.   3. Partially imaged probable hemorrhagic cyst left kidney.    Addendum created on 3/21/2020 5:02:13 AM EDT     Findings discussed with Dr. Mendez  by Boris Crisostomo MD  at 5:01 a.m. 03/21/2020    DOV TURNER M.D., ATTENDING RADIOLOGIST  This document has been electronically signed. Mar 21 2020  9:17AM      < end of copied text >    EKG:    < from: 12 Lead ECG (03.21.20 @ 01:16) >    Ventricular Rate 102 BPM    Atrial Rate 102 BPM    P-R Interval 178 ms    QRS Duration 92 ms    Q-T Interval 352 ms    QTC Calculation(Bezet) 458 ms    P Axis 14 degrees    R Axis 8 degrees    T Axis 24 degrees    Diagnosis Line Sinus tachycardia  Minimal voltage criteria for LVH, may be normal variant  Borderline ECG  When compared with ECG of 21-MAR-2020 01:14, (Unconfirmed)  No significant change was found  Confirmed by Will Green MD ([id number]) on 3/22/2020 11:57:20 AM    < end of copied text > [facility] - Varinder Walker, Norma, Arturo, Syed, Ihsan, Cele Cr  Office Number: [phone]    Initial Consult Note: This is a 58 y/o M HTN, DM2, HLD, and cardiomegaly presented with respiratory symptoms along with fever, found to have COVID 19 after a recent travel to [place].  Overnight, he had runs of VT, 36 beats as the longest.  Patient claimed, he was sitting on the chair, watching TV when it happened.  Denies palpitations, CP, dizziness, lightheadedness, SOB or BLAKE.  Admits to have dry cough.  He follows with his private cardiologist, Dr. Amezcua and claimed to have had a cardiac cath in 1990's which was normal.  Claims to have had a normal Nuclear stress test and TTE >1 year ago.    CHIEF COMPLAINT: Patient is a 59y old  Male who presents with a chief complaint of hypoxic respiratory failure; r/o COVID-19; bilateral pneumonia (27 Mar 2020 10:16)    HPI:  58 y/o M with PMHx of HTN, DM2, BPH presented to the ED complaining of fever and shortness of breath past few days. He recently returned from [place] ([facility] and [place]) approximately one week ago - took chloroquine for his trip. He developed difficulty breathing and was seen in urgent care. At urgent care, noted to be hypoxic to 88% on room air with bilateral pneumonia and referred to the ED for further evaluation. He admits to dyspnea mostly with exertion. Denies chest pain, palpitations, abdominal pain, n/v/d. States that he is otherwise feeling well. Had no sick contacts. He states that his symptoms started about 6 days PTA. No other complaints.    In the ED, VSS although SpO2 91% on RA. Labs notable for: lymphopenia, normal WBC although with neutrophilic shift, H/H 12.6/37.4, Glucose 143, AST 79. CXR with bilateral infiltrates. EKG: Sinus tachycardia at 102bpm, no acute ST-T changes. Given rocephin and azithro in ED. (21 Mar 2020 01:48)    PAST MEDICAL & SURGICAL HISTORY:  Gastric ulcer  Dyspnea on exertion  Cardiomegaly  HLD (hyperlipidemia)  Type 2 diabetes mellitus  BPH (benign prostatic hyperplasia)  HTN (hypertension)  History of endoscopy    SOCIAL HISTORY:  No tobacco, ethanol, or drug abuse.  FAMILY HISTORY:  FH: CAD (coronary artery disease): sister (with hx of coronary stent) and father (with history of CABG)  Family history of CABG: father    No family history of acute MI or sudden cardiac death.  MEDICATIONS  (STANDING):  amLODIPine   Tablet 10 milliGRAM(s) Oral daily  atorvastatin 10 milliGRAM(s) Oral at bedtime  dextrose 5%. 1000 milliLiter(s) (50 mL/Hr) IV Continuous <Continuous>  dextrose 50% Injectable 12.5 Gram(s) IV Push once  dextrose 50% Injectable 25 Gram(s) IV Push once  dextrose 50% Injectable 25 Gram(s) IV Push once  enoxaparin Injectable 40 milliGRAM(s) SubCutaneous at bedtime  insulin lispro (HumaLOG) corrective regimen sliding scale   SubCutaneous three times a day before meals  insulin lispro (HumaLOG) corrective regimen sliding scale   SubCutaneous at bedtime  melatonin 5 milliGRAM(s) Oral at bedtime  metoprolol tartrate 25 milliGRAM(s) Oral two times a day  saccharomyces boulardii 250 milliGRAM(s) Oral two times a day  tamsulosin 0.8 milliGRAM(s) Oral at bedtime    MEDICATIONS  (PRN):  acetaminophen   Tablet .. 1000 milliGRAM(s) Oral every 6 hours PRN Temp greater or equal to 38C (100.4F), Mild Pain (1 - 3)  ALBUTerol    90 MICROgram(s) HFA Inhaler 2 Puff(s) Inhalation every 6 hours PRN Shortness of Breath and/or Wheezing  dextrose 40% Gel 15 Gram(s) Oral once PRN Blood Glucose LESS THAN 70 milliGRAM(s)/deciliter  glucagon  Injectable 1 milliGRAM(s) IntraMuscular once PRN Glucose LESS THAN 70 milligrams/deciliter  guaiFENesin   Syrup  (Sugar-Free) 200 milliGRAM(s) Oral every 6 hours PRN Cough    Allergies    No Known Allergies    Intolerances    REVIEW OF SYSTEMS:  CONSTITUTIONAL: No weakness, fevers or chills  EYES/ENT: No visual changes;  No vertigo or throat pain   NECK: No pain or stiffness  RESPIRATORY: +cough.  No wheezing, hemoptysis; No shortness of breath  CARDIOVASCULAR: No chest pain or palpitations  GASTROINTESTINAL: No abdominal pain. No nausea, vomiting, or hematemesis; No diarrhea or constipation. No melena or hematochezia.  GENITOURINARY: No dysuria, frequency or hematuria  NEUROLOGICAL: No numbness or weakness  SKIN: No itching or rash  All other review of systems is negative unless indicated above  VITAL SIGNS:   Vital Signs Last 24 Hrs  T(C): 36.8 (27 Mar 2020 10:25), Max: 37.1 (27 Mar 2020 05:43)  T(F): 98.3 (27 Mar 2020 10:25), Max: 98.7 (27 Mar 2020 05:43)  HR: 83 (27 Mar 2020 10:25) (83 - 100)  BP: 116/77 (27 Mar 2020 10:25) (116/77 - 159/77)  BP(mean): --  RR: 17 (27 Mar 2020 10:25) (17 - 18)  SpO2: 96% (27 Mar 2020 10:25) (90% - 96%)  I&O's Summary    On Exam:  Constitutional: NAD, alert and oriented x 3.  Obese  Lungs:  Non-labored, breath sounds are diminished bilaterally, No wheezing, rales.  Very fine rhonchi at bases  Cardiovascular: RRR. S1 and S2 positive.  No murmurs, rubs, gallops or clicks  Gastrointestinal: Bowel Sounds present, soft, nontender.   Lymph: No peripheral edema. No cervical lymphadenopathy.  Neurological: Alert, no focal deficits  Skin: No rashes or ulcers   Psych:  Mood & affect appropriate.    LABS: All Labs Reviewed:                        11.7   8.19  )-----------( 635      ( 27 Mar 2020 09:38 )             36.1                         12.2   7.98  )-----------( 651      ( 26 Mar 2020 10:21 )             37.2                         11.2   8.78  )-----------( 568      ( 25 Mar 2020 07:52 )             34.6     27 Mar 2020 09:38    142    |  108    |  10     ----------------------------<  100    4.2     |  27     |  0.73   26 Mar 2020 10:21    140    |  105    |  10     ----------------------------<  116    3.9     |  28     |  0.72   25 Mar 2020 07:52    140    |  103    |  8      ----------------------------<  98     3.7     |  29     |  0.75     Ca    8.9        27 Mar 2020 09:38  Ca    8.8        26 Mar 2020 10:21  Ca    8.5        25 Mar 2020 07:52  Phos  3.3       26 Mar 2020 22:55  Phos  3.3       25 Mar 2020 07:52  Mg     2.1       27 Mar 2020 09:38  Mg     2.2       26 Mar 2020 22:55  Mg     2.1       26 Mar 2020 10:21        CARDIAC MARKERS ( 26 Mar 2020 22:55 )  <.015 ng/mL / x     / 50 U/L / x     / <1.0 ng/mL    Blood Culture:   RADIOLOGY:  < from: CT Chest No Cont (03.21.20 @ 04:50) >    EXAM:  CT CHEST                            PROCEDURE DATE:  03/21/2020          INTERPRETATION:  Exam: CT Chest Without Contrast   Exam date and time: 3/21/2020 4:27 AM   Age: 59 years old   Clinical indication: Shortness of breath     TECHNIQUE:   Imaging protocol: Computed tomography of the chest without contrast.     COMPARISON:   DX XR CHEST PA AND LATERAL 3/21/2020 12:26 AM     FINDINGS:     Lungs: Central tracheobronchial tree is grossly patent. No endobronchial lesions. Multifocal patchyairspace disease with associated groundglass opacities throughout the lung fields compatible with a multifocal pneumonia with consideration for viral pneumonia including SARS-2-CoV/COVID-19. No pleural effusion.  Pleural space: Unremarkable. No pneumothorax. No pleural effusion.   Heart: Mild cardiomegaly.   Aorta: No aortic aneurysm.   Lymph nodes: Small reactive mediastinal lymph nodes.   Liver: Diffuse hepatic steatosis.   Kidneys and ureters: Hyperdense probable hemorrhagic cyst left kidney. This measures 2 cm.   Bones/joints: Degenerative changes. No acute fracture.   Soft tissues: Unremarkable.     IMPRESSION:     1. Multifocal patchy airspace disease with associated groundglass opacities throughout the lung fields compatible with a multifocal pneumonia with consideration for viral pneumonia including SARS-2-CoV/COVID-19.     2. Hepatic steatosis.   3. Partially imaged probable hemorrhagic cyst left kidney.    Addendum created on 3/21/2020 5:02:13 AM EDT     Findings discussed with Dr. Mendez  by Boris Crisostomo MD  at 5:01 a.m. 03/21/2020    DOV TURNER M.D., ATTENDING RADIOLOGIST  This document has been electronically signed. Mar 21 2020  9:17AM      < end of copied text >    EKG:    < from: 12 Lead ECG (03.21.20 @ 01:16) >    Ventricular Rate 102 BPM    Atrial Rate 102 BPM    P-R Interval 178 ms    QRS Duration 92 ms    Q-T Interval 352 ms    QTC Calculation(Bezet) 458 ms    P Axis 14 degrees    R Axis 8 degrees    T Axis 24 degrees    Diagnosis Line Sinus tachycardia  Minimal voltage criteria for LVH, may be normal variant  Borderline ECG  When compared with ECG of 21-MAR-2020 01:14, (Unconfirmed)  No significant change was found  Confirmed by Norma RIVAS, Will ([id number]) on 3/22/2020 11:57:20 AM    < end of copied text >

## 2020-03-27 NOTE — CONSULT NOTE ADULT - ASSESSMENT
Assessment/Plan:  59y Male    Gale Harrison DNP, NP-C  Cardiology  Spectra #[phone] Assessment/Plan:  58 y/o M HTN, DM2, HLD, and cardiomegaly? presented with respiratory symptoms along with fever, found to have COVID 19 after a recent travel to [place].  Overnight, he had runs of VT, 36 beats as the longest.  Patient claimed, he was sitting on the chair, watching TV when it happened.  Denies palpitations, CP, dizziness, lightheadedness, SOB or BLAKE.  Admits to have dry cough.  He follows with his private cardiologist, Dr. Amezcua and claimed to have had a cardiac cath in 1990's which was normal.  Claims to have had a normal Nuclear stress test and TTE >1 year ago.    NSVT  - He had several episodes of NSVT overnight, longest as 36 beats.  Patient was asymptomatic the whole time  - He remains NSR on tele, thereafter.  - He has no evidence of volume overload  - His EKG showed NSR with no ischemic changes.  His CE's x 3 are negative  - His electrolytes are normal.  Please monitor electrolytes and replete to keep K>4 and Mag>2  - Increase BB to 50 mg bid  - If NSVT recurs, increase BB and discontinue Norvasc to have more room for AVN titration  - Obtain TTE as soon as feasible to assess cardiac structures  - Attempt to obtain outpatient records from Dr. Seven Haas+  - Supportive care  - Supplemental O2 as needed    HTN  - Continue Norvasc for now but can discontinue if higher dose of BB needed    DM  - Management per Primary    HLD  - Continue statin    DVT ppx  - Per Primary    Gale Harrison DNP, NP-C  Cardiology  Spectra #[phone] Assessment/Plan:  58 y/o M HTN, DM2, HLD, and cardiomegaly? presented with respiratory symptoms along with fever, found to have COVID 19 after a recent travel to [place].  Overnight, he had runs of VT, 36 beats as the longest.  Patient claimed, he was sitting on the chair, watching TV when it happened.  Denies palpitations, CP, dizziness, lightheadedness, SOB or BLAKE.  Admits to have dry cough.  He follows with his private cardiologist, Dr. Amezcua and claimed to have had a cardiac cath in 1990's which was normal.  Claims to have had a normal Nuclear stress test and TTE >1 year ago.    NSVT  - He had several episodes of asx NSVT overnight, longest as 36 beats.  P   - He remains NSR on tele, thereafter.  - He has no evidence of volume overload  - His EKG showed NSR with no ischemic changes.  His CE's x 3 are negative  - His electrolytes are normal.  Please monitor electrolytes and replete to keep K>4 and Mag>2  - Increase BB to 50 mg bid  - If NSVT recurs, increase BB and discontinue Norvasc to have more room for AVN titration  - Obtain TTE as soon as feasible to assess cardiac structures  - Attempt to obtain outpatient records from Dr. Amezcua   - this arrhythmia may be on the basis of cad, or on the basis of covid induced abnormalities in structure/function. if cad-related, unfortunately it is likely that definitive assessment will need to wait    Covid+  - Supportive care  - Supplemental O2 as needed    HTN  - Continue Norvasc for now but can discontinue if higher dose of BB needed    DM  - Management per Primary    HLD  - Continue statin    DVT ppx  - Per Primary    Gale Harrison DNP, NP-C  Cardiology  Spectra #[phone]

## 2020-03-27 NOTE — CHART NOTE - NSCHARTNOTEFT_GEN_A_CORE
Assessment:   Pt is a 58 y/o M admitted for +COVID-19. Attempted to contact pt via phone. Per EMR, tolerated being on room air yesterday. On low dose sliding scale insulin. No GI distress noted. Last BM documented 3/23. (3/24) 100% PO documented per flowsheets.    Factors impacting intake: [ x ] none [ ] nausea  [ ] vomiting [ ] diarrhea [ ] constipation  [ ]chewing problems [ ] swallowing issues  [ ] other:     Diet Presciption: Diet, DASH/TLC:   Sodium & Cholesterol Restricted  Consistent Carbohydrate {Evening Snack} (03-22-20 @ 09:04)    Current Weight: (3/23) 255 lb    Pertinent Medications: MEDICATIONS  (STANDING):  amLODIPine   Tablet 10 milliGRAM(s) Oral daily  atorvastatin 10 milliGRAM(s) Oral at bedtime  dextrose 5%. 1000 milliLiter(s) (50 mL/Hr) IV Continuous <Continuous>  dextrose 50% Injectable 12.5 Gram(s) IV Push once  dextrose 50% Injectable 25 Gram(s) IV Push once  dextrose 50% Injectable 25 Gram(s) IV Push once  enoxaparin Injectable 40 milliGRAM(s) SubCutaneous at bedtime  insulin lispro (HumaLOG) corrective regimen sliding scale   SubCutaneous three times a day before meals  insulin lispro (HumaLOG) corrective regimen sliding scale   SubCutaneous at bedtime  melatonin 5 milliGRAM(s) Oral at bedtime  metoprolol tartrate 25 milliGRAM(s) Oral two times a day  saccharomyces boulardii 250 milliGRAM(s) Oral two times a day  tamsulosin 0.8 milliGRAM(s) Oral at bedtime    MEDICATIONS  (PRN):  acetaminophen   Tablet .. 1000 milliGRAM(s) Oral every 6 hours PRN Temp greater or equal to 38C (100.4F), Mild Pain (1 - 3)  ALBUTerol    90 MICROgram(s) HFA Inhaler 2 Puff(s) Inhalation every 6 hours PRN Shortness of Breath and/or Wheezing  dextrose 40% Gel 15 Gram(s) Oral once PRN Blood Glucose LESS THAN 70 milliGRAM(s)/deciliter  glucagon  Injectable 1 milliGRAM(s) IntraMuscular once PRN Glucose LESS THAN 70 milligrams/deciliter  guaiFENesin   Syrup  (Sugar-Free) 200 milliGRAM(s) Oral every 6 hours PRN Cough    Pertinent Labs: 03-27 Na142 mmol/L Glu 100 mg/dL<H> K+ 4.2 mmol/L Cr  0.73 mg/dL BUN 10 mg/dL 03-26 Phos 3.3 mg/dL 03-24 Alb 2.4 g/dL<L> 03-21 [id number] 6.7 %<H> 03-21 Chol 94 mg/dL LDL 55 mg/dL HDL 23 mg/dL<L> Trig 80 mg/dL     CAPILLARY BLOOD GLUCOSE      POCT Blood Glucose.: 129 mg/dL (27 Mar 2020 08:08)  POCT Blood Glucose.: 148 mg/dL (26 Mar 2020 22:43)  POCT Blood Glucose.: 130 mg/dL (26 Mar 2020 16:34)  POCT Blood Glucose.: 130 mg/dL (26 Mar 2020 12:57)    Skin: No edema/pressure injuries documented.    Estimated Needs:   [ x ] no change since previous assessment  [ ] recalculated:     Previous Nutrition Diagnosis:   [ ] Inadequate Energy Intake [ ]Inadequate Oral Intake [ ] Excessive Energy Intake   [ ] Underweight [ x ] Increased Nutrient Needs [ ] Overweight/Obesity   [ ] Altered GI Function [ ] Unintended Weight Loss [ ] Food & Nutrition Related Knowledge Deficit [ ] Malnutrition     Nutrition Diagnosis is [ x ] ongoing, being addressed with PO diet     New Nutrition Diagnosis: [ x ] not applicable       Interventions:   Recommend  [ ] Change Diet To:  [ ] Nutrition Supplement  [ ] Nutrition Support  [ x ] Other:   1) Continue DASH/TLC, Consistent CHO diet.  2) Encourage adequate intake.    Monitoring and Evaluation:   [ x ] PO intake [ x ] Tolerance to diet prescription [ x ] weights [ x ] labs[ x ] follow up per protocol  [ ] other:

## 2020-03-27 NOTE — PROGRESS NOTE ADULT - SUBJECTIVE AND OBJECTIVE BOX
CHIEF COMPLAINT/INTERVAL HISTORY:  Pt. seen and evaluated for COVID 19 infection.  Pt. is in no distress.  Feeling well.  Denies having any SOB.  Tolerated being off O2 nasal canula for most of yesterday afternoon.  Had 39 beats of Vtach on remote tele last night.  Pt. reports being asymptomatic and watching a movie during that time.     REVIEW OF SYSTEMS:  No fever, CP, SOB, or abdominal pain.     Vital Signs Last 24 Hrs  T(C): 37.1 (27 Mar 2020 05:43), Max: 37.1 (27 Mar 2020 05:43)  T(F): 98.7 (27 Mar 2020 05:43), Max: 98.7 (27 Mar 2020 05:43)  HR: 85 (27 Mar 2020 05:43) (85 - 100)  BP: 151/81 (27 Mar 2020 05:43) (137/71 - 159/77)  BP(mean): --  RR: 17 (27 Mar 2020 05:43) (17 - 18)  SpO2: 96% (27 Mar 2020 05:43) (90% - 96%)    PHYSICAL EXAM:  GENERAL: NAD  HEENT: EOMI, hearing normal, conjunctiva and sclera clear  Chest: CTA bilaterally, no wheezing  CV: S1S2, RRR,   GI: soft, +BS, NT/ND  Musculoskeletal: no edema  Psychiatric: affect nL, mood nL  Skin: warm and dry    LABS:                        11.7   8.19  )-----------( 635      ( 27 Mar 2020 09:38 )             36.1     03-27    142  |  108  |  10  ----------------------------<  100<H>  4.2   |  27  |  0.73    Ca    8.9      27 Mar 2020 09:38  Phos  3.3     03-26  Mg     2.1     03-27      Assessment and Plan:  -Acute hypoxic respiratory failure 2/2 COVID 19 infection:  RVP negative.  Blood cx NGTD.  Continue Tylenol 1000mg PO Q6h PRN and Guaifenesin 200mg PO Q6h PRN.  Tolerated being on room air yesterday.  Avoid HFNC/NIPPV/Nebulizer tx.  Pulmonary and ID f/u  -Episode of Vtach:  Was asymptomatic.  Cardiac enzymes normal.  Cardiology consult.   -HTN:  Continue metoprolol 25mg PO BID and Norvasc 10mg PO daily  -Anemia:  stable with no gross bleeding.  Will continue to monitor H/H  -Type 2 DM:  continue low dose humalog sliding scale  -BPH:  continue Flomax 0.8mg PO QHS  -HLD:  continue Lipitor 10mg PO QHS  -VTE ppx:  Lovenox 40mg SQ QHS

## 2020-03-27 NOTE — CONSULT NOTE ADULT - ATTENDING COMMENTS
The patient was personally seen and examined, in addition to being examined and evaluated by NP.  All elements of the note were edited where appropriate.    long runs of nsvt >30 beats  to first assess ef  ultimately will need a more comprehensive assessment but this will need to wait for options to become available given the covid pandemic  bb for now

## 2020-03-27 NOTE — CHART NOTE - NSCHARTNOTEFT_GEN_A_CORE
I was called by his wife to see the patient for arrhythmias.  I follow with her in the office and have seen him in the distant past as well.  When I reviewed the chart, I found that he was already seen by Dr. Walker's group.  I willl not see the patient.  Thank you.

## 2020-03-28 DIAGNOSIS — B97.21 SARS-ASSOCIATED CORONAVIRUS AS THE CAUSE OF DISEASES CLASSIFIED ELSEWHERE: ICD-10-CM

## 2020-03-28 LAB
ANION GAP SERPL CALC-SCNC: 5 MMOL/L — SIGNIFICANT CHANGE UP (ref 5–17)
BASOPHILS # BLD AUTO: 0.05 K/UL — SIGNIFICANT CHANGE UP (ref 0–0.2)
BASOPHILS NFR BLD AUTO: 0.6 % — SIGNIFICANT CHANGE UP (ref 0–2)
BUN SERPL-MCNC: 11 MG/DL — SIGNIFICANT CHANGE UP (ref 7–23)
CALCIUM SERPL-MCNC: 9.1 MG/DL — SIGNIFICANT CHANGE UP (ref 8.5–10.1)
CHLORIDE SERPL-SCNC: 110 MMOL/L — HIGH (ref 96–108)
CO2 SERPL-SCNC: 27 MMOL/L — SIGNIFICANT CHANGE UP (ref 22–31)
CREAT SERPL-MCNC: 0.76 MG/DL — SIGNIFICANT CHANGE UP (ref 0.5–1.3)
EOSINOPHIL # BLD AUTO: 0.13 K/UL — SIGNIFICANT CHANGE UP (ref 0–0.5)
EOSINOPHIL NFR BLD AUTO: 1.5 % — SIGNIFICANT CHANGE UP (ref 0–6)
GLUCOSE SERPL-MCNC: 111 MG/DL — HIGH (ref 70–99)
HCT VFR BLD CALC: 36.2 % — LOW (ref 39–50)
HGB BLD-MCNC: 11.7 G/DL — LOW (ref 13–17)
IMM GRANULOCYTES NFR BLD AUTO: 1.5 % — SIGNIFICANT CHANGE UP (ref 0–1.5)
LYMPHOCYTES # BLD AUTO: 1.67 K/UL — SIGNIFICANT CHANGE UP (ref 1–3.3)
LYMPHOCYTES # BLD AUTO: 19.6 % — SIGNIFICANT CHANGE UP (ref 13–44)
MCHC RBC-ENTMCNC: 24.7 PG — LOW (ref 27–34)
MCHC RBC-ENTMCNC: 32.3 GM/DL — SIGNIFICANT CHANGE UP (ref 32–36)
MCV RBC AUTO: 76.5 FL — LOW (ref 80–100)
MONOCYTES # BLD AUTO: 0.83 K/UL — SIGNIFICANT CHANGE UP (ref 0–0.9)
MONOCYTES NFR BLD AUTO: 9.7 % — SIGNIFICANT CHANGE UP (ref 2–14)
NEUTROPHILS # BLD AUTO: 5.72 K/UL — SIGNIFICANT CHANGE UP (ref 1.8–7.4)
NEUTROPHILS NFR BLD AUTO: 67.1 % — SIGNIFICANT CHANGE UP (ref 43–77)
NRBC # BLD: 0 /100 WBCS — SIGNIFICANT CHANGE UP (ref 0–0)
PLATELET # BLD AUTO: 738 K/UL — HIGH (ref 150–400)
POTASSIUM SERPL-MCNC: 3.9 MMOL/L — SIGNIFICANT CHANGE UP (ref 3.5–5.3)
POTASSIUM SERPL-SCNC: 3.9 MMOL/L — SIGNIFICANT CHANGE UP (ref 3.5–5.3)
RBC # BLD: 4.73 M/UL — SIGNIFICANT CHANGE UP (ref 4.2–5.8)
RBC # FLD: 14.2 % — SIGNIFICANT CHANGE UP (ref 10.3–14.5)
SODIUM SERPL-SCNC: 142 MMOL/L — SIGNIFICANT CHANGE UP (ref 135–145)
WBC # BLD: 8.53 K/UL — SIGNIFICANT CHANGE UP (ref 3.8–10.5)
WBC # FLD AUTO: 8.53 K/UL — SIGNIFICANT CHANGE UP (ref 3.8–10.5)

## 2020-03-28 PROCEDURE — 99232 SBSQ HOSP IP/OBS MODERATE 35: CPT

## 2020-03-28 PROCEDURE — 93306 TTE W/DOPPLER COMPLETE: CPT | Mod: 26

## 2020-03-28 RX ADMIN — ATORVASTATIN CALCIUM 10 MILLIGRAM(S): 80 TABLET, FILM COATED ORAL at 21:08

## 2020-03-28 RX ADMIN — Medication 250 MILLIGRAM(S): at 05:39

## 2020-03-28 RX ADMIN — AMLODIPINE BESYLATE 10 MILLIGRAM(S): 2.5 TABLET ORAL at 05:39

## 2020-03-28 RX ADMIN — Medication 250 MILLIGRAM(S): at 17:35

## 2020-03-28 RX ADMIN — ENOXAPARIN SODIUM 40 MILLIGRAM(S): 100 INJECTION SUBCUTANEOUS at 21:08

## 2020-03-28 RX ADMIN — TAMSULOSIN HYDROCHLORIDE 0.8 MILLIGRAM(S): 0.4 CAPSULE ORAL at 21:09

## 2020-03-28 RX ADMIN — Medication 50 MILLIGRAM(S): at 05:39

## 2020-03-28 RX ADMIN — Medication 5 MILLIGRAM(S): at 21:09

## 2020-03-28 RX ADMIN — Medication 50 MILLIGRAM(S): at 17:35

## 2020-03-28 NOTE — PROGRESS NOTE ADULT - SUBJECTIVE AND OBJECTIVE BOX
CHIEF COMPLAINT/INTERVAL HISTORY:  Pt. seen and evaluated for COVID 19 infection.  Pt. is in no distress.  Feels well on room air.      REVIEW OF SYSTEMS:  No fever, CP, SOB, or abdominal pain.     Vital Signs Last 24 Hrs  T(C): 36.6 (28 Mar 2020 08:12), Max: 37.2 (27 Mar 2020 16:58)  T(F): 97.8 (28 Mar 2020 08:12), Max: 98.9 (27 Mar 2020 16:58)  HR: 89 (28 Mar 2020 08:12) (83 - 103)  BP: 152/86 (28 Mar 2020 08:12) (116/77 - 152/86)  BP(mean): --  RR: 18 (28 Mar 2020 08:12) (17 - 18)  SpO2: 94% (28 Mar 2020 08:12) (94% - 96%)    PHYSICAL EXAM:  GENERAL: NAD  HEENT: EOMI, hearing normal, conjunctiva and sclera clear  Chest: CTA bilaterally, no wheezing  CV: S1S2, RRR,   GI: soft, +BS, NT/ND  Musculoskeletal: no edema  Psychiatric: affect nL, mood nL  Skin: warm and dry    LABS:                        11.7   8.53  )-----------( 738      ( 28 Mar 2020 08:03 )             36.2     03-28    142  |  110<H>  |  11  ----------------------------<  111<H>  3.9   |  27  |  0.76    Ca    9.1      28 Mar 2020 08:03  Phos  3.3     03-26  Mg     2.1     03-27      Assessment and Plan:  -Acute hypoxic respiratory failure 2/2 COVID 19 infection:  RVP negative.  Blood cx NG.  Continue Tylenol 1000mg PO Q6h PRN and Guaifenesin 200mg PO Q6h PRN.  Maintaining SpO2 on room air.  Avoid HFNC/NIPPV/Nebulizer tx.  Pulmonary and ID f/u  -NSVT:  continue Metoprolol 50mg PO BID.  Check echo.  Cardiology f/u  -HTN:  Continue metoprolol 50mg PO BID and Norvasc 10mg PO daily  -Anemia:  stable with no gross bleeding.  Will continue to monitor H/H  -Type 2 DM:  continue low dose humalog sliding scale  -BPH:  continue Flomax 0.8mg PO QHS  -HLD:  continue Lipitor 10mg PO QHS  -VTE ppx:  Lovenox 40mg SQ QHS

## 2020-03-28 NOTE — PROGRESS NOTE ADULT - PROBLEM SELECTOR PLAN 1
tolerating off NC  keep sat > 90 pct  I alexis  mobilize  self proning  tylenol and robitussin PRN  cardio follow up noted  overall appears better  covid 19 illness - supportive management in effect  isol precs

## 2020-03-28 NOTE — PROGRESS NOTE ADULT - ASSESSMENT
Assessment/Plan:  60 y/o M HTN, DM2, HLD, and cardiomegaly? presented with respiratory symptoms along with fever, found to have COVID 19 after a recent travel to [place].  Overnight, he had runs of VT, 36 beats as the longest.  Patient claimed, he was sitting on the chair, watching TV when it happened.  Denies palpitations, CP, dizziness, lightheadedness, SOB or BLAKE.  Admits to have dry cough.  He follows with his private cardiologist, Dr. Amezcua and claimed to have had a cardiac cath in 1990's which was normal.  Claims to have had a normal Nuclear stress test and TTE >1 year ago.    NSVT  - No further episodes of VT on monitor  - He remains NSR on tele  - He has no evidence of volume overload  - His EKG showed NSR with no ischemic changes.  His CE's x 3 are negative  - His electrolytes are normal.  Please monitor electrolytes and replete to keep K>4 and Mag>2  - CW BB 50 mg bid  - If NSVT recurs, increase BB and discontinue Norvasc to have more room for AVN titration  - Obtain TTE as soon as feasible to assess cardiac structures pending   - Attempt to obtain outpatient records from Dr. Amezcua   - this arrhythmia may be on the basis of cad, or on the basis of covid induced abnormalities in structure/function. if cad-related, unfortunately it is likely that definitive assessment will need to wait    Covid+  - Supportive care  - Supplemental O2 as needed    HTN  - Continue Norvasc for now but can discontinue if higher dose of BB needed    DM  - Management per Primary    HLD  - Continue statin    DVT ppx  - Per Primary    Will [facility]mond[facility]  Cardiology   Spectra #[phone] Assessment/Plan:  60 y/o M HTN, DM2, HLD, and cardiomegaly? presented with respiratory symptoms along with fever, found to have COVID 19 after a recent travel to [place].  Overnight, he had runs of VT, 36 beats as the longest.  Patient claimed, he was sitting on the chair, watching TV when it happened.  Denies palpitations, CP, dizziness, lightheadedness, SOB or BLAKE.  Admits to have dry cough.  He follows with his private cardiologist, Dr. Amezcua and claimed to have had a cardiac cath in 1990's which was normal.  Claims to have had a normal Nuclear stress test and TTE >1 year ago.    NSVT  - No further episodes of VT on monitor  - this arrhythmia was likely demand related secondary to covid infection and underlying cad,  if cad-related, unfortunately it is likely that definitive assessment will need to wait and pt maybe D/C to follow up as outpt for ischemic w/u  - He remains NSR on tele  - He has no evidence of volume overload  - His EKG showed NSR with no ischemic changes.  His CE's x 3 are negative  - His electrolytes are normal.  Please monitor electrolytes and replete to keep K>4 and Mag>2  - CW BB 50 mg bid  - If NSVT recurs, increase BB and discontinue Norvasc to have more room for AVN titration  - TTE nL LV & RV size and function no significant valvular dysfunction   - Attempt to obtain outpatient records from Dr. Seven Haas+  - Supportive care  - Supplemental O2 as needed    HTN  - Continue Norvasc for now but can discontinue if higher dose of BB needed    DM  - Management per Primary    HLD  - Continue statin    DVT ppx  - Per Primary  -From a cardiac standpoint the patient may be discharged home  - To follow up w/ cardiology for ischemia torsten Edmondson [facility]  Cardiology   Spectra #[phone]

## 2020-03-28 NOTE — PROGRESS NOTE ADULT - SUBJECTIVE AND OBJECTIVE BOX
[facility] -- Varinder Walker, Norma, Arturo, Ihsan Lynch Savella  Office # [phone]      Follow Up:    Vtach  Subjective/Observations:   No events overnight resting comfortably in chair.  No complaints of chest pain, dyspnea, or palpitations reported. No signs of orthopnea or PND.     REVIEW OF SYSTEMS: All other review of systems is negative unless indicated above    PAST MEDICAL & SURGICAL HISTORY:  Gastric ulcer  Dyspnea on exertion  Cardiomegaly  HLD (hyperlipidemia)  Type 2 diabetes mellitus  BPH (benign prostatic hyperplasia)  HTN (hypertension)  History of endoscopy      MEDICATIONS  (STANDING):  amLODIPine   Tablet 10 milliGRAM(s) Oral daily  atorvastatin 10 milliGRAM(s) Oral at bedtime  dextrose 5%. 1000 milliLiter(s) (50 mL/Hr) IV Continuous <Continuous>  dextrose 50% Injectable 12.5 Gram(s) IV Push once  dextrose 50% Injectable 25 Gram(s) IV Push once  dextrose 50% Injectable 25 Gram(s) IV Push once  enoxaparin Injectable 40 milliGRAM(s) SubCutaneous at bedtime  insulin lispro (HumaLOG) corrective regimen sliding scale   SubCutaneous three times a day before meals  insulin lispro (HumaLOG) corrective regimen sliding scale   SubCutaneous at bedtime  melatonin 5 milliGRAM(s) Oral at bedtime  metoprolol tartrate 50 milliGRAM(s) Oral two times a day  saccharomyces boulardii 250 milliGRAM(s) Oral two times a day  tamsulosin 0.8 milliGRAM(s) Oral at bedtime    MEDICATIONS  (PRN):  acetaminophen   Tablet .. 1000 milliGRAM(s) Oral every 6 hours PRN Temp greater or equal to 38C (100.4F), Mild Pain (1 - 3)  ALBUTerol    90 MICROgram(s) HFA Inhaler 2 Puff(s) Inhalation every 6 hours PRN Shortness of Breath and/or Wheezing  dextrose 40% Gel 15 Gram(s) Oral once PRN Blood Glucose LESS THAN 70 milliGRAM(s)/deciliter  glucagon  Injectable 1 milliGRAM(s) IntraMuscular once PRN Glucose LESS THAN 70 milligrams/deciliter  guaiFENesin   Syrup  (Sugar-Free) 200 milliGRAM(s) Oral every 6 hours PRN Cough      Allergies    No Known Allergies    Intolerances        Vital Signs Last 24 Hrs  T(C): 36.6 (28 Mar 2020 08:12), Max: 37.2 (27 Mar 2020 16:58)  T(F): 97.8 (28 Mar 2020 08:12), Max: 98.9 (27 Mar 2020 16:58)  HR: 89 (28 Mar 2020 08:12) (88 - 103)  BP: 152/86 (28 Mar 2020 08:12) (132/77 - 152/86)  BP(mean): --  RR: 18 (28 Mar 2020 08:12) (17 - 18)  SpO2: 94% (28 Mar 2020 08:12) (94% - 94%)    I&O's Summary    27 Mar 2020 07:01  -  28 Mar 2020 07:00  --------------------------------------------------------  IN: 460 mL / OUT: 150 mL / NET: 310 mL          PHYSICAL EXAM:  TELE:  w/o [facility]  Constitutional: NAD, awake and alert, well-developed  HEENT: Moist Mucous Membranes, Anicteric  Pulmonary: Non-labored, breath sounds with crackles bilaterally at bases , No wheezing, or rhonchi   Cardiovascular: Regular, S1 and S2 nl, No murmurs, rubs, gallops or clicks  Gastrointestinal: Bowel Sounds present, soft, nontender.   Lymph: No lymphadenopathy. No peripheral edema.  Skin: No visible rashes or ulcers.  Psych:  Mood & affect appropriate    LABS: All Labs Reviewed:                        11.7   8.53  )-----------( 738      ( 28 Mar 2020 08:03 )             36.2                         11.7   8.19  )-----------( 635      ( 27 Mar 2020 09:38 )             36.1                         12.2   7.98  )-----------( 651      ( 26 Mar 2020 10:21 )             37.2     28 Mar 2020 08:03    142    |  110    |  11     ----------------------------<  111    3.9     |  27     |  0.76   27 Mar 2020 09:38    142    |  108    |  10     ----------------------------<  100    4.2     |  27     |  0.73   26 Mar 2020 10:21    140    |  105    |  10     ----------------------------<  116    3.9     |  28     |  0.72     Ca    9.1        28 Mar 2020 08:03  Ca    8.9        27 Mar 2020 09:38  Ca    8.8        26 Mar 2020 10:21  Phos  3.3       26 Mar 2020 22:55  Mg     2.1       27 Mar 2020 09:38  Mg     2.2       26 Mar 2020 22:55  Mg     2.1       26 Mar 2020 10:21        CARDIAC MARKERS ( 26 Mar 2020 22:55 )  <.015 ng/mL / x     / 50 U/L / x     / <1.0 ng/mL         ECG:  < from: 12 Lead ECG (03.27.20 @ 10:34) >  Ventricular Rate 89 BPM    Atrial Rate 89 BPM    P-R Interval 190 ms    QRS Duration 92 ms    Q-T Interval 370 ms    QTC Calculation(Bezet) 450 ms    P Axis 11 degrees    R Axis 2 degrees    T Axis 20 degrees    Diagnosis Line Normal sinus rhythm  Moderate voltage criteria for LVH, may be normal variant  Inferior infarct , age undetermined  Abnormal ECG  When compared with ECG of 21-MAR-2020 01:16,  No significant change was found  Confirmed by Archie Morris MD ([id number]) on 3/27/2020 3:14:01 PM    < end of copied text >    Echo:    Radiology:  < from: CT Chest No Cont (03.21.20 @ 04:50) >  EXAM:  CT CHEST                            PROCEDURE DATE:  03/21/2020          INTERPRETATION:  Exam: CT Chest Without Contrast   Exam date and time: 3/21/2020 4:27 AM   Age: 59 years old   Clinical indication: Shortness of breath     TECHNIQUE:   Imaging protocol: Computed tomography of the chest without contrast.     COMPARISON:   DX XR CHEST PA AND LATERAL 3/21/2020 12:26 AM     FINDINGS:     Lungs: Central tracheobronchial tree is grossly patent. No endobronchial lesions. Multifocal patchyairspace disease with associated groundglass opacities throughout the lung fields compatible with a multifocal pneumonia with consideration for viral pneumonia including SARS-2-CoV/COVID-19. No pleural effusion.  Pleural space: Unremarkable. No pneumothorax. No pleural effusion.   Heart: Mild cardiomegaly.   Aorta: No aortic aneurysm.   Lymph nodes: Small reactive mediastinal lymph nodes.   Liver: Diffuse hepatic steatosis.   Kidneys and ureters: Hyperdense probable hemorrhagic cyst left kidney. This measures 2 cm.   Bones/joints: Degenerative changes. No acute fracture.   Soft tissues: Unremarkable.     IMPRESSION:     1. Multifocal patchy airspace disease with associated groundglass opacities throughout the lung fields compatible with a multifocal pneumonia with consideration for viral pneumonia including SARS-2-CoV/COVID-19.     2. Hepatic steatosis.   3. Partially imaged probable hemorrhagic cyst left kidney.    Addendum created on 3/21/2020 5:02:13 AM EDT     Findings discussed with Dr. Mendez  by Boris Crisostomo MD  at 5:01 a.m. 03/21/2020    DOV TURNER M.D., ATTENDING RADIOLOGIST  This document has been electronically signed. Mar 21 2020  9:17AM    < end of copied text >

## 2020-03-28 NOTE — PROGRESS NOTE ADULT - SUBJECTIVE AND OBJECTIVE BOX
Date/Time Patient Seen:  		  Referring MD:   Data Reviewed	       Patient is a 59y old  Male who presents with a chief complaint of hypoxic respiratory failure; r/o COVID-19; bilateral pneumonia (28 Mar 2020 10:37)      Subjective/HPI     PAST MEDICAL & SURGICAL HISTORY:  Gastric ulcer  Dyspnea on exertion  Cardiomegaly  HLD (hyperlipidemia)  Type 2 diabetes mellitus  BPH (benign prostatic hyperplasia)  HTN (hypertension)  History of endoscopy        Medication list         MEDICATIONS  (STANDING):  amLODIPine   Tablet 10 milliGRAM(s) Oral daily  atorvastatin 10 milliGRAM(s) Oral at bedtime  dextrose 5%. 1000 milliLiter(s) (50 mL/Hr) IV Continuous <Continuous>  dextrose 50% Injectable 12.5 Gram(s) IV Push once  dextrose 50% Injectable 25 Gram(s) IV Push once  dextrose 50% Injectable 25 Gram(s) IV Push once  enoxaparin Injectable 40 milliGRAM(s) SubCutaneous at bedtime  insulin lispro (HumaLOG) corrective regimen sliding scale   SubCutaneous three times a day before meals  insulin lispro (HumaLOG) corrective regimen sliding scale   SubCutaneous at bedtime  melatonin 5 milliGRAM(s) Oral at bedtime  metoprolol tartrate 50 milliGRAM(s) Oral two times a day  saccharomyces boulardii 250 milliGRAM(s) Oral two times a day  tamsulosin 0.8 milliGRAM(s) Oral at bedtime    MEDICATIONS  (PRN):  acetaminophen   Tablet .. 1000 milliGRAM(s) Oral every 6 hours PRN Temp greater or equal to 38C (100.4F), Mild Pain (1 - 3)  ALBUTerol    90 MICROgram(s) HFA Inhaler 2 Puff(s) Inhalation every 6 hours PRN Shortness of Breath and/or Wheezing  dextrose 40% Gel 15 Gram(s) Oral once PRN Blood Glucose LESS THAN 70 milliGRAM(s)/deciliter  glucagon  Injectable 1 milliGRAM(s) IntraMuscular once PRN Glucose LESS THAN 70 milligrams/deciliter  guaiFENesin   Syrup  (Sugar-Free) 200 milliGRAM(s) Oral every 6 hours PRN Cough         Vitals log        ICU Vital Signs Last 24 Hrs  T(C): 36.6 (28 Mar 2020 08:12), Max: 37.2 (27 Mar 2020 16:58)  T(F): 97.8 (28 Mar 2020 08:12), Max: 98.9 (27 Mar 2020 16:58)  HR: 89 (28 Mar 2020 08:12) (88 - 103)  BP: 152/86 (28 Mar 2020 08:12) (132/77 - 152/86)  BP(mean): --  ABP: --  ABP(mean): --  RR: 18 (28 Mar 2020 08:12) (17 - 18)  SpO2: 94% (28 Mar 2020 08:12) (94% - 94%)           Input and Output:  I&O's Detail    27 Mar 2020 07:01  -  28 Mar 2020 07:00  --------------------------------------------------------  IN:    Oral Fluid: 460 mL  Total IN: 460 mL    OUT:    Voided: 150 mL  Total OUT: 150 mL    Total NET: 310 mL          Lab Data                        11.7   8.53  )-----------( 738      ( 28 Mar 2020 08:03 )             36.2     03-28    142  |  110<H>  |  11  ----------------------------<  111<H>  3.9   |  27  |  0.76    Ca    9.1      28 Mar 2020 08:03  Phos  3.3     03-26  Mg     2.1     03-27        CARDIAC MARKERS ( 26 Mar 2020 22:55 )  <.015 ng/mL / x     / 50 U/L / x     / <1.0 ng/mL        Review of Systems	      Objective     Physical Examination    hear s1s2  lung dec BS  abd soft      Pertinent Lab findings & Imaging      Niño:  NO   Adequate UO     I&O's Detail    27 Mar 2020 07:01  -  28 Mar 2020 07:00  --------------------------------------------------------  IN:    Oral Fluid: 460 mL  Total IN: 460 mL    OUT:    Voided: 150 mL  Total OUT: 150 mL    Total NET: 310 mL               Discussed with:     Cultures:	        Radiology

## 2020-03-29 ENCOUNTER — TRANSCRIPTION ENCOUNTER (OUTPATIENT)
Age: 60
End: 2020-03-29

## 2020-03-29 VITALS
SYSTOLIC BLOOD PRESSURE: 143 MMHG | HEART RATE: 89 BPM | RESPIRATION RATE: 16 BRPM | DIASTOLIC BLOOD PRESSURE: 80 MMHG | TEMPERATURE: 98 F

## 2020-03-29 PROCEDURE — 36415 COLL VENOUS BLD VENIPUNCTURE: CPT

## 2020-03-29 PROCEDURE — 82550 ASSAY OF CK (CPK): CPT

## 2020-03-29 PROCEDURE — 99239 HOSP IP/OBS DSCHRG MGMT >30: CPT

## 2020-03-29 PROCEDURE — 87486 CHLMYD PNEUM DNA AMP PROBE: CPT

## 2020-03-29 PROCEDURE — 93306 TTE W/DOPPLER COMPLETE: CPT

## 2020-03-29 PROCEDURE — 86803 HEPATITIS C AB TEST: CPT

## 2020-03-29 PROCEDURE — 84484 ASSAY OF TROPONIN QUANT: CPT

## 2020-03-29 PROCEDURE — 85384 FIBRINOGEN ACTIVITY: CPT

## 2020-03-29 PROCEDURE — 99232 SBSQ HOSP IP/OBS MODERATE 35: CPT

## 2020-03-29 PROCEDURE — 80048 BASIC METABOLIC PNL TOTAL CA: CPT

## 2020-03-29 PROCEDURE — 83735 ASSAY OF MAGNESIUM: CPT

## 2020-03-29 PROCEDURE — 85610 PROTHROMBIN TIME: CPT

## 2020-03-29 PROCEDURE — 82962 GLUCOSE BLOOD TEST: CPT

## 2020-03-29 PROCEDURE — 84100 ASSAY OF PHOSPHORUS: CPT

## 2020-03-29 PROCEDURE — 96367 TX/PROPH/DG ADDL SEQ IV INF: CPT

## 2020-03-29 PROCEDURE — 96365 THER/PROPH/DIAG IV INF INIT: CPT

## 2020-03-29 PROCEDURE — 87581 M.PNEUMON DNA AMP PROBE: CPT

## 2020-03-29 PROCEDURE — U0001: CPT

## 2020-03-29 PROCEDURE — 99285 EMERGENCY DEPT VISIT HI MDM: CPT | Mod: 25

## 2020-03-29 PROCEDURE — 87633 RESP VIRUS 12-25 TARGETS: CPT

## 2020-03-29 PROCEDURE — 84145 PROCALCITONIN (PCT): CPT

## 2020-03-29 PROCEDURE — 85027 COMPLETE CBC AUTOMATED: CPT

## 2020-03-29 PROCEDURE — 83036 HEMOGLOBIN GLYCOSYLATED A1C: CPT

## 2020-03-29 PROCEDURE — 71250 CT THORAX DX C-: CPT

## 2020-03-29 PROCEDURE — 93005 ELECTROCARDIOGRAM TRACING: CPT

## 2020-03-29 PROCEDURE — 71046 X-RAY EXAM CHEST 2 VIEWS: CPT

## 2020-03-29 PROCEDURE — 85379 FIBRIN DEGRADATION QUANT: CPT

## 2020-03-29 PROCEDURE — 82728 ASSAY OF FERRITIN: CPT

## 2020-03-29 PROCEDURE — 80061 LIPID PANEL: CPT

## 2020-03-29 PROCEDURE — 83605 ASSAY OF LACTIC ACID: CPT

## 2020-03-29 PROCEDURE — 87040 BLOOD CULTURE FOR BACTERIA: CPT

## 2020-03-29 PROCEDURE — 80074 ACUTE HEPATITIS PANEL: CPT

## 2020-03-29 PROCEDURE — 86140 C-REACTIVE PROTEIN: CPT

## 2020-03-29 PROCEDURE — 87631 RESP VIRUS 3-5 TARGETS: CPT

## 2020-03-29 PROCEDURE — 80053 COMPREHEN METABOLIC PANEL: CPT

## 2020-03-29 PROCEDURE — 83615 LACTATE (LD) (LDH) ENZYME: CPT

## 2020-03-29 PROCEDURE — 87798 DETECT AGENT NOS DNA AMP: CPT

## 2020-03-29 PROCEDURE — 99053 MED SERV 10PM-8AM 24 HR FAC: CPT

## 2020-03-29 PROCEDURE — 85652 RBC SED RATE AUTOMATED: CPT

## 2020-03-29 PROCEDURE — 85730 THROMBOPLASTIN TIME PARTIAL: CPT

## 2020-03-29 PROCEDURE — 82553 CREATINE MB FRACTION: CPT

## 2020-03-29 PROCEDURE — 84443 ASSAY THYROID STIM HORMONE: CPT

## 2020-03-29 RX ORDER — ACETAMINOPHEN 500 MG
2 TABLET ORAL
Qty: 0 | Refills: 0 | DISCHARGE
Start: 2020-03-29

## 2020-03-29 RX ORDER — ALBUTEROL 90 UG/1
2 AEROSOL, METERED ORAL
Qty: 1 | Refills: 0
Start: 2020-03-29

## 2020-03-29 RX ORDER — METOPROLOL TARTRATE 50 MG
1 TABLET ORAL
Qty: 60 | Refills: 0 | DISCHARGE
Start: 2020-03-29

## 2020-03-29 RX ORDER — ATORVASTATIN CALCIUM 20 MG/1
1 TABLET, FILM COATED ORAL
Qty: 30 | Refills: 0 | DISCHARGE
Start: 2020-03-29

## 2020-03-29 RX ORDER — METOPROLOL TARTRATE 50 MG
1 TABLET ORAL
Qty: 60 | Refills: 0
Start: 2020-03-29

## 2020-03-29 RX ORDER — METOPROLOL TARTRATE 50 MG
1 TABLET ORAL
Qty: 0 | Refills: 0 | DISCHARGE

## 2020-03-29 RX ORDER — ATORVASTATIN CALCIUM 80 MG/1
1 TABLET, FILM COATED ORAL
Qty: 30 | Refills: 0
Start: 2020-03-29

## 2020-03-29 RX ADMIN — Medication 50 MILLIGRAM(S): at 05:28

## 2020-03-29 RX ADMIN — AMLODIPINE BESYLATE 10 MILLIGRAM(S): 2.5 TABLET ORAL at 05:29

## 2020-03-29 RX ADMIN — Medication 250 MILLIGRAM(S): at 05:28

## 2020-03-29 NOTE — PROGRESS NOTE ADULT - ASSESSMENT
60 y/o M HTN, DM2, HLD, and cardiomegaly? presented with respiratory symptoms along with fever, found to have COVID 19 after a recent travel to [place].  Overnight, he had runs of VT, 36 beats as the longest.  Patient claimed, he was sitting on the chair, watching TV when it happened.  Denies palpitations, CP, dizziness, lightheadedness, SOB or BLAKE.  Admits to have dry cough.  He follows with his private cardiologist, Dr. Amezcua and claimed to have had a cardiac cath in 1990's which was normal.  Claims to have had a normal Nuclear stress test and TTE >1 year ago.    NSVT  - No further episodes of VT on monitor.  Can discontinue telemetry monitoring  - His arrhythmia was likely demand related secondary to his COVID infection and underlying CAD,  if CAD-related, unfortunately, it is likely that a definitive assessment such as cardiac cath will need to wait and pt maybe D/C to follow up as outpt for ischemic w/u  - He remains NSR on tele  - He has no evidence of volume overload  - His initial EKG showed NSR with no ischemic changes.  His CE's x 3 are negative  - His electrolytes were normal on admission, so electrolyte imbalance as etiology of his NSVT's is ruled out.  Please monitor electrolytes and replete to keep K>4 and Mag>2  - Continue BB 50 mg bid  - Prelim TTE showed normaL LV & RV size and function with no significant valvular dysfunction   - He will follow up with us as outpatient as soon as feasible    Covid+  - Supportive care  - Supplemental O2 as needed    HTN  - Continue Norvasc for now but can discontinue if higher dose of BB needed    DM  - Management per Primary    HLD  - Continue statin    DVT ppx  - Per Primary    From a cardiac standpoint, he is stable for discharge  To follow up w/ us for ischemia torsten Harrison DNP, NP-C  Cardiology   Spectra #[phone]

## 2020-03-29 NOTE — PROGRESS NOTE ADULT - REASON FOR ADMISSION
hypoxic respiratory failure; r/o COVID-19; bilateral pneumonia

## 2020-03-29 NOTE — PROGRESS NOTE ADULT - SUBJECTIVE AND OBJECTIVE BOX
[facility] -- Varinder Walker, Arturo Green, Ihsan Lynch [place], [place]  Office # [phone]    Follow Up:  NSVT    Subjective/Observations: No complaints.  No palpitations overnight.  No arrhythmia overnight.  Remained in NSR    REVIEW OF SYSTEMS: All other review of systems is negative unless indicated above  PAST MEDICAL & SURGICAL HISTORY:  Gastric ulcer  Dyspnea on exertion  Cardiomegaly  HLD (hyperlipidemia)  Type 2 diabetes mellitus  BPH (benign prostatic hyperplasia)  HTN (hypertension)  History of endoscopy    MEDICATIONS  (STANDING):  amLODIPine   Tablet 10 milliGRAM(s) Oral daily  atorvastatin 10 milliGRAM(s) Oral at bedtime  dextrose 5%. 1000 milliLiter(s) (50 mL/Hr) IV Continuous <Continuous>  dextrose 50% Injectable 12.5 Gram(s) IV Push once  dextrose 50% Injectable 25 Gram(s) IV Push once  dextrose 50% Injectable 25 Gram(s) IV Push once  enoxaparin Injectable 40 milliGRAM(s) SubCutaneous at bedtime  insulin lispro (HumaLOG) corrective regimen sliding scale   SubCutaneous three times a day before meals  insulin lispro (HumaLOG) corrective regimen sliding scale   SubCutaneous at bedtime  melatonin 5 milliGRAM(s) Oral at bedtime  metoprolol tartrate 50 milliGRAM(s) Oral two times a day  saccharomyces boulardii 250 milliGRAM(s) Oral two times a day  tamsulosin 0.8 milliGRAM(s) Oral at bedtime    MEDICATIONS  (PRN):  acetaminophen   Tablet .. 1000 milliGRAM(s) Oral every 6 hours PRN Temp greater or equal to 38C (100.4F), Mild Pain (1 - 3)  ALBUTerol    90 MICROgram(s) HFA Inhaler 2 Puff(s) Inhalation every 6 hours PRN Shortness of Breath and/or Wheezing  dextrose 40% Gel 15 Gram(s) Oral once PRN Blood Glucose LESS THAN 70 milliGRAM(s)/deciliter  glucagon  Injectable 1 milliGRAM(s) IntraMuscular once PRN Glucose LESS THAN 70 milligrams/deciliter  guaiFENesin   Syrup  (Sugar-Free) 200 milliGRAM(s) Oral every 6 hours PRN Cough    Allergies    No Known Allergies    Intolerances    Vital Signs Last 24 Hrs  T(C): 36.9 (29 Mar 2020 04:38), Max: 36.9 (29 Mar 2020 04:38)  T(F): 98.4 (29 Mar 2020 04:38), Max: 98.4 (29 Mar 2020 04:38)  HR: 88 (29 Mar 2020 04:38) (82 - 96)  BP: 145/84 (29 Mar 2020 04:38) (129/84 - 145/84)  BP(mean): --  RR: 18 (29 Mar 2020 04:38) (18 - 18)  SpO2: 94% (29 Mar 2020 04:38) (94% - 96%)  I&O's Summary      PHYSICAL EXAM: Per Dr. Morris  TELE: NSR  Constitutional: NAD, awake and alert, obese  HEENT: Moist Mucous Membranes, Anicteric  Pulmonary: Non-labored, breath sounds are clear bilaterally, No wheezing, rales or rhonchi  Cardiovascular: Regular, S1 and S2, No murmurs, rubs, gallops or clicks  Gastrointestinal: Bowel Sounds present, soft, nontender.   Lymph: No peripheral edema. No lymphadenopathy.  Skin: No visible rashes or ulcers.  Psych:  Mood & affect appropriate  LABS: All Labs Reviewed:                        11.7   8.53  )-----------( 738      ( 28 Mar 2020 08:03 )             36.2                         11.7   8.19  )-----------( 635      ( 27 Mar 2020 09:38 )             36.1                         12.2   7.98  )-----------( 651      ( 26 Mar 2020 10:21 )             37.2     28 Mar 2020 08:03    142    |  110    |  11     ----------------------------<  111    3.9     |  27     |  0.76   27 Mar 2020 09:38    142    |  108    |  10     ----------------------------<  100    4.2     |  27     |  0.73   26 Mar 2020 10:21    140    |  105    |  10     ----------------------------<  116    3.9     |  28     |  0.72     Ca    9.1        28 Mar 2020 08:03  Ca    8.9        27 Mar 2020 09:38  Ca    8.8        26 Mar 2020 10:21  Phos  3.3       26 Mar 2020 22:55  Mg     2.1       27 Mar 2020 09:38  Mg     2.2       26 Mar 2020 22:55  Mg     2.1       26 Mar 2020 10:21    TTE pending    < from: CT Chest No Cont (03.21.20 @ 04:50) >    EXAM:  CT CHEST                          PROCEDURE DATE:  03/21/2020      INTERPRETATION:  Exam: CT Chest Without Contrast   Exam date and time: 3/21/2020 4:27 AM   Age: 59 years old   Clinical indication: Shortness of breath     TECHNIQUE:   Imaging protocol: Computed tomography of the chest without contrast.     COMPARISON:   DX XR CHEST PA AND LATERAL 3/21/2020 12:26 AM     FINDINGS:     Lungs: Central tracheobronchial tree is grossly patent. No endobronchial lesions. Multifocal patchyairspace disease with associated groundglass opacities throughout the lung fields compatible with a multifocal pneumonia with consideration for viral pneumonia including SARS-2-CoV/COVID-19. No pleural effusion.  Pleural space: Unremarkable. No pneumothorax. No pleural effusion.   Heart: Mild cardiomegaly.   Aorta: No aortic aneurysm.   Lymph nodes: Small reactive mediastinal lymph nodes.   Liver: Diffuse hepatic steatosis.   Kidneys and ureters: Hyperdense probable hemorrhagic cyst left kidney. This measures 2 cm.   Bones/joints: Degenerative changes. No acute fracture.   Soft tissues: Unremarkable.     IMPRESSION:     1. Multifocal patchy airspace disease with associated groundglass opacities throughout the lung fields compatible with a multifocal pneumonia with consideration for viral pneumonia including SARS-2-CoV/COVID-19.     2. Hepatic steatosis.   3. Partially imaged probable hemorrhagic cyst left kidney.    Addendum created on 3/21/2020 5:02:13 AM EDT     Findings discussed with Dr. Mendez  by Boris Crisostomo MD  at 5:01 a.m. 03/21/2020    DOV TURNER M.D., ATTENDING RADIOLOGIST  This document has been electronically signed. Mar 21 2020  9:17AM     < end of copied text >    < from: 12 Lead ECG (03.27.20 @ 10:34) >    Ventricular Rate 89 BPM    Atrial Rate 89 BPM    P-R Interval 190 ms    QRS Duration 92 ms    Q-T Interval 370 ms    QTC Calculation(Bezet) 450 ms    P Axis 11 degrees    R Axis 2 degrees    T Axis 20 degrees    Diagnosis Line Normal sinus rhythm  Moderate voltage criteria for LVH, may be normal variant  Inferior infarct , age undetermined  Abnormal ECG  When compared with ECG of 21-MAR-2020 01:16,  No significant change was found  Confirmed by Archie Morris MD ([id number]) on 3/27/2020 3:14:01 PM    < end of copied text >

## 2020-03-29 NOTE — PROGRESS NOTE ADULT - PROBLEM SELECTOR PROBLEM 1
R/O SARS-associated coronavirus infection
SARS-associated coronavirus as cause of disease classified elsewhere
SARS-associated coronavirus as cause of disease classified elsewhere
Acute respiratory failure with hypoxia
SARS pneumonia
SARS pneumonia

## 2020-03-29 NOTE — PROGRESS NOTE ADULT - PROBLEM SELECTOR PLAN 1
covid  supportive measures  I alexis  mobilize  self prone  cont to monitor sx and VS and HD and Sat  I alexis as able to cooperate  cvs rx regimen and BP control and management of comorbidities -

## 2020-03-29 NOTE — PROGRESS NOTE ADULT - SUBJECTIVE AND OBJECTIVE BOX
Date/Time Patient Seen:  		  Referring MD:   Data Reviewed	       Patient is a 59y old  Male who presents with a chief complaint of hypoxic respiratory failure; r/o COVID-19; bilateral pneumonia (28 Mar 2020 14:16)      Subjective/HPI     PAST MEDICAL & SURGICAL HISTORY:  Gastric ulcer  Dyspnea on exertion  Cardiomegaly  HLD (hyperlipidemia)  Type 2 diabetes mellitus  BPH (benign prostatic hyperplasia)  HTN (hypertension)  History of endoscopy        Medication list         MEDICATIONS  (STANDING):  amLODIPine   Tablet 10 milliGRAM(s) Oral daily  atorvastatin 10 milliGRAM(s) Oral at bedtime  dextrose 5%. 1000 milliLiter(s) (50 mL/Hr) IV Continuous <Continuous>  dextrose 50% Injectable 12.5 Gram(s) IV Push once  dextrose 50% Injectable 25 Gram(s) IV Push once  dextrose 50% Injectable 25 Gram(s) IV Push once  enoxaparin Injectable 40 milliGRAM(s) SubCutaneous at bedtime  insulin lispro (HumaLOG) corrective regimen sliding scale   SubCutaneous three times a day before meals  insulin lispro (HumaLOG) corrective regimen sliding scale   SubCutaneous at bedtime  melatonin 5 milliGRAM(s) Oral at bedtime  metoprolol tartrate 50 milliGRAM(s) Oral two times a day  saccharomyces boulardii 250 milliGRAM(s) Oral two times a day  tamsulosin 0.8 milliGRAM(s) Oral at bedtime    MEDICATIONS  (PRN):  acetaminophen   Tablet .. 1000 milliGRAM(s) Oral every 6 hours PRN Temp greater or equal to 38C (100.4F), Mild Pain (1 - 3)  ALBUTerol    90 MICROgram(s) HFA Inhaler 2 Puff(s) Inhalation every 6 hours PRN Shortness of Breath and/or Wheezing  dextrose 40% Gel 15 Gram(s) Oral once PRN Blood Glucose LESS THAN 70 milliGRAM(s)/deciliter  glucagon  Injectable 1 milliGRAM(s) IntraMuscular once PRN Glucose LESS THAN 70 milligrams/deciliter  guaiFENesin   Syrup  (Sugar-Free) 200 milliGRAM(s) Oral every 6 hours PRN Cough         Vitals log        ICU Vital Signs Last 24 Hrs  T(C): 36.9 (29 Mar 2020 04:38), Max: 36.9 (29 Mar 2020 04:38)  T(F): 98.4 (29 Mar 2020 04:38), Max: 98.4 (29 Mar 2020 04:38)  HR: 88 (29 Mar 2020 04:38) (82 - 96)  BP: 145/84 (29 Mar 2020 04:38) (129/84 - 152/86)  BP(mean): --  ABP: --  ABP(mean): --  RR: 18 (29 Mar 2020 04:38) (18 - 18)  SpO2: 94% (29 Mar 2020 04:38) (94% - 96%)           Input and Output:  I&O's Detail      Lab Data                        11.7   8.53  )-----------( 738      ( 28 Mar 2020 08:03 )             36.2     03-28    142  |  110<H>  |  11  ----------------------------<  111<H>  3.9   |  27  |  0.76    Ca    9.1      28 Mar 2020 08:03  Mg     2.1     03-27              Review of Systems	      Objective     Physical Examination    heart s1s2  lung dc BS      Pertinent Lab findings & Imaging      Renay:  NO   Adequate UO     I&O's Detail           Discussed with:     Cultures:	        Radiology

## 2020-07-30 ENCOUNTER — APPOINTMENT (OUTPATIENT)
Dept: CARDIOLOGY | Facility: CLINIC | Age: 60
End: 2020-07-30

## 2020-10-30 NOTE — ED ADULT TRIAGE NOTE - CCCP TRG CHIEF CMPLNT
Discharge instructions given to pt and spouse. Both verbalize understanding,deny any questions and/or concerns. Pt denies need for wheelchair and ambulates to bathroom w/ standby assist, slow steady gait noted. Then d/c'd off unit in wheelchair w/ all belongings. Discharge Criteria    Inpatients must meet Criteria 1 through 7. All other patients are either YES or N/A. If a NO is chosen then Anesthesia or Surgeon must be notified. 1.  Minimum 30 minutes after last dose of sedative medication, minimum 120 minutes after last dose of reversal agent. Yes      2. Systolic BP stable within 20 mmHg for 30 minutes & systolic BP between 90 & 149 or within 10 mmHg of baseline. Yes      3. Pulse between 60 and 100 or within 10 bpm of baseline. Yes      4. Spontaneous respiratory rate >/= 10 per minute. Yes      5. SaO2 >/= 95 or  >/= baseline. Yes      6. Able to cough and swallow or return to baseline function. Yes      7. Alert and oriented or return to baseline mental status. Yes      8. Demonstrates controlled, coordinated movements, ambulates with steady gait, or return to baseline activity function. Yes      9. Minimal or no pain or nausea, or at a level tolerable and acceptable to patient. Yes      10. Takes and retains oral fluids as allowed. Yes      11. Procedural / perioperative site stable. Minimal or no bleeding. Yes          12. If GI endoscopy procedure, minimal or no abdominal distention or passing flatus. Yes      13. Written discharge instructions and emergency telephone number provided. Yes      14. Accompanied by a responsible adult.     Yes difficulty breathing/fever

## 2021-01-01 NOTE — DISCHARGE NOTE NURSING/CASE MANAGEMENT/SOCIAL WORK - PATIENT PORTAL LINK FT
Statement Selected You can access the FollowMyHealth Patient Portal offered by [facility] by registering at the following website: [email or web address]. By joining [facility]’s FollowMyHealth portal, you will also be able to view your health information using other applications (apps) compatible with our system.

## 2022-03-10 NOTE — PATIENT PROFILE ADULT - LEGAL HELP
Pt came in for cll/md/poss fluids. Per MD, no fluids needed at this time. Needle taken out and pt sent home. no

## 2023-10-11 NOTE — ED PROVIDER NOTE - ENMT, MLM
Airway patent, Nasal mucosa clear. Mouth with normal mucosa. Throat has no vesicles, no oropharyngeal exudates and uvula is midline. MM MOist. neck supple. no meningeal signs. Stable

## 2023-12-05 NOTE — PATIENT PROFILE ADULT - HAS THE PATIENT EXPERIENCED ANY OF THE FOLLOWING WITHIN THE WEEK PRIOR TO ADMISSION?
Quality 137: Melanoma: Continuity Of Care - Recall System: Patient information entered into a recall system that includes: target date for the next exam specified AND a process to follow up with patients regarding missed or unscheduled appointments
Detail Level: Detailed
When Should The Patient Follow-Up For Their Next Full-Body Skin Exam?: 6 Months
Detail Level: Simple
Include Location In Plan?: No
Detail Level: Generalized
Detail Level: Zone
no

## 2024-04-23 PROBLEM — N40.0 BENIGN PROSTATIC HYPERPLASIA WITHOUT LOWER URINARY TRACT SYMPTOMS: Chronic | Status: ACTIVE | Noted: 2020-03-21

## 2024-04-23 PROBLEM — I10 ESSENTIAL (PRIMARY) HYPERTENSION: Chronic | Status: ACTIVE | Noted: 2020-03-21

## 2024-04-23 PROBLEM — E78.5 HYPERLIPIDEMIA, UNSPECIFIED: Chronic | Status: ACTIVE | Noted: 2020-03-21

## 2024-04-23 PROBLEM — E11.9 TYPE 2 DIABETES MELLITUS WITHOUT COMPLICATIONS: Chronic | Status: ACTIVE | Noted: 2020-03-21

## 2024-04-23 PROBLEM — I51.7 CARDIOMEGALY: Chronic | Status: ACTIVE | Noted: 2020-03-21

## 2024-04-23 PROBLEM — R06.09 OTHER FORMS OF DYSPNEA: Chronic | Status: ACTIVE | Noted: 2020-03-21

## 2024-04-23 PROBLEM — K25.9 GASTRIC ULCER, UNSPECIFIED AS ACUTE OR CHRONIC, WITHOUT HEMORRHAGE OR PERFORATION: Chronic | Status: ACTIVE | Noted: 2020-03-21

## 2024-05-06 ENCOUNTER — APPOINTMENT (OUTPATIENT)
Dept: SURGERY | Facility: CLINIC | Age: 64
End: 2024-05-06
Payer: COMMERCIAL

## 2024-05-06 VITALS
WEIGHT: 249 LBS | HEIGHT: 72 IN | RESPIRATION RATE: 14 BRPM | OXYGEN SATURATION: 97 % | BODY MASS INDEX: 33.72 KG/M2 | SYSTOLIC BLOOD PRESSURE: 159 MMHG | TEMPERATURE: 98.3 F | HEART RATE: 76 BPM | DIASTOLIC BLOOD PRESSURE: 95 MMHG

## 2024-05-06 DIAGNOSIS — R10.32 LEFT LOWER QUADRANT PAIN: ICD-10-CM

## 2024-05-06 DIAGNOSIS — K40.90 UNILATERAL INGUINAL HERNIA, W/OUT OBSTRUCTION OR GANGRENE, NOT SPECIFIED AS RECURRENT: ICD-10-CM

## 2024-05-06 PROCEDURE — 99024 POSTOP FOLLOW-UP VISIT: CPT

## 2024-07-17 ENCOUNTER — OUTPATIENT (OUTPATIENT)
Dept: OUTPATIENT SERVICES | Facility: HOSPITAL | Age: 64
LOS: 1 days | End: 2024-07-17
Payer: COMMERCIAL

## 2024-07-17 VITALS
DIASTOLIC BLOOD PRESSURE: 84 MMHG | OXYGEN SATURATION: 99 % | HEART RATE: 81 BPM | SYSTOLIC BLOOD PRESSURE: 140 MMHG | WEIGHT: 255.07 LBS | RESPIRATION RATE: 14 BRPM | TEMPERATURE: 98 F | HEIGHT: 72 IN

## 2024-07-17 DIAGNOSIS — K40.30 UNILATERAL INGUINAL HERNIA, WITH OBSTRUCTION, WITHOUT GANGRENE, NOT SPECIFIED AS RECURRENT: ICD-10-CM

## 2024-07-17 DIAGNOSIS — Z90.49 ACQUIRED ABSENCE OF OTHER SPECIFIED PARTS OF DIGESTIVE TRACT: Chronic | ICD-10-CM

## 2024-07-17 DIAGNOSIS — Z90.89 ACQUIRED ABSENCE OF OTHER ORGANS: Chronic | ICD-10-CM

## 2024-07-17 DIAGNOSIS — Z01.818 ENCOUNTER FOR OTHER PREPROCEDURAL EXAMINATION: ICD-10-CM

## 2024-07-17 DIAGNOSIS — Z98.890 OTHER SPECIFIED POSTPROCEDURAL STATES: Chronic | ICD-10-CM

## 2024-07-17 LAB
A1C WITH ESTIMATED AVERAGE GLUCOSE RESULT: 6.3 % — HIGH (ref 4–5.6)
ANION GAP SERPL CALC-SCNC: 8 MMOL/L — SIGNIFICANT CHANGE UP (ref 5–17)
BUN SERPL-MCNC: 13 MG/DL — SIGNIFICANT CHANGE UP (ref 7–23)
CALCIUM SERPL-MCNC: 9.1 MG/DL — SIGNIFICANT CHANGE UP (ref 8.5–10.1)
CHLORIDE SERPL-SCNC: 108 MMOL/L — SIGNIFICANT CHANGE UP (ref 96–108)
CO2 SERPL-SCNC: 24 MMOL/L — SIGNIFICANT CHANGE UP (ref 22–31)
CREAT SERPL-MCNC: 1.3 MG/DL — SIGNIFICANT CHANGE UP (ref 0.5–1.3)
EGFR: 61 ML/MIN/1.73M2 — SIGNIFICANT CHANGE UP
ESTIMATED AVERAGE GLUCOSE: 134 MG/DL — HIGH (ref 68–114)
GLUCOSE SERPL-MCNC: 126 MG/DL — HIGH (ref 70–99)
HCT VFR BLD CALC: 39.8 % — SIGNIFICANT CHANGE UP (ref 39–50)
HGB BLD-MCNC: 13.2 G/DL — SIGNIFICANT CHANGE UP (ref 13–17)
MCHC RBC-ENTMCNC: 25.5 PG — LOW (ref 27–34)
MCHC RBC-ENTMCNC: 33.2 GM/DL — SIGNIFICANT CHANGE UP (ref 32–36)
MCV RBC AUTO: 77 FL — LOW (ref 80–100)
NRBC # BLD: 0 /100 WBCS — SIGNIFICANT CHANGE UP (ref 0–0)
PLATELET # BLD AUTO: 300 K/UL — SIGNIFICANT CHANGE UP (ref 150–400)
POTASSIUM SERPL-MCNC: 4 MMOL/L — SIGNIFICANT CHANGE UP (ref 3.5–5.3)
POTASSIUM SERPL-SCNC: 4 MMOL/L — SIGNIFICANT CHANGE UP (ref 3.5–5.3)
RBC # BLD: 5.17 M/UL — SIGNIFICANT CHANGE UP (ref 4.2–5.8)
RBC # FLD: 16.3 % — HIGH (ref 10.3–14.5)
SODIUM SERPL-SCNC: 140 MMOL/L — SIGNIFICANT CHANGE UP (ref 135–145)
WBC # BLD: 8.88 K/UL — SIGNIFICANT CHANGE UP (ref 3.8–10.5)
WBC # FLD AUTO: 8.88 K/UL — SIGNIFICANT CHANGE UP (ref 3.8–10.5)

## 2024-07-17 PROCEDURE — 93005 ELECTROCARDIOGRAM TRACING: CPT

## 2024-07-17 PROCEDURE — 36415 COLL VENOUS BLD VENIPUNCTURE: CPT

## 2024-07-17 PROCEDURE — 93010 ELECTROCARDIOGRAM REPORT: CPT

## 2024-07-17 PROCEDURE — 85027 COMPLETE CBC AUTOMATED: CPT

## 2024-07-17 PROCEDURE — 83036 HEMOGLOBIN GLYCOSYLATED A1C: CPT

## 2024-07-17 PROCEDURE — G0463: CPT

## 2024-07-17 PROCEDURE — 80048 BASIC METABOLIC PNL TOTAL CA: CPT

## 2024-07-17 RX ORDER — ATORVASTATIN CALCIUM 20 MG/1
1 TABLET, FILM COATED ORAL
Refills: 0 | DISCHARGE

## 2024-07-17 RX ORDER — HYDROCHLOROTHIAZIDE 25 MG
1 TABLET ORAL
Refills: 0 | DISCHARGE

## 2024-07-17 RX ORDER — FUROSEMIDE 10 MG/ML
1 INJECTION, SOLUTION INTRAMUSCULAR; INTRAVENOUS
Refills: 0 | DISCHARGE

## 2024-07-17 RX ORDER — EMPAGLIFLOZIN 25 MG/1
1 TABLET, FILM COATED ORAL
Refills: 0 | DISCHARGE

## 2024-07-17 RX ORDER — METFORMIN HYDROCHLORIDE 850 MG/1
1 TABLET, FILM COATED ORAL
Refills: 0 | DISCHARGE

## 2024-07-17 RX ORDER — METOPROLOL TARTRATE 50 MG
1 TABLET ORAL
Refills: 0 | DISCHARGE

## 2024-07-17 RX ORDER — VALSARTAN 320 MG/1
1 TABLET ORAL
Refills: 0 | DISCHARGE

## 2024-07-17 NOTE — H&P PST ADULT - NSICDXFAMILYHX_GEN_ALL_CORE_FT
FAMILY HISTORY:  Father  Still living? No  Family history of CABG, Age at diagnosis: Age Unknown  Family history of coronary artery disease, Age at diagnosis: Age Unknown  Family history of lung cancer, Age at diagnosis: Age Unknown    Mother  Still living? No  Family history of bone cancer, Age at diagnosis: Age Unknown

## 2024-07-17 NOTE — H&P PST ADULT - HISTORY OF PRESENT ILLNESS
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 (managed with a light machine at home and a RX cream - pt does not remember the name of the cream),Arrhythmia (H/O Cardiac Ablation X2), Hypercholesterolemia, HTN, Hyperlipidemia,  Abnormal EKG, Cardiomegaly, BLAKE, Type 2 DM, EVAN ( not currently using a CPAP machine), BPH, Gastric Ulcer, Diverticulitis/Diverticulosis ( last attack in 2010), Hearing loss ( wears bilateral hearing aids),  COVID-19 ( was hospitalized here in [place]);  now with  Unilateral Inguinal Hernia With Obstruction Without Gangrene Not Specified As Recurrent; presents today for PST prior to Repair LEFT Inguinal Hernia With MESH with Dr Siddharth Henriquez on 7/31/2024.     Pt notes "pulling and bulging" in left inguinal region. Pt notes he has had these SX for several years. Denies any N/V. Denies any changes on bowel or bladder habits. Following consult, exam and discussions with Dr Henriquez regarding treatment options pt is electing for scheduled procedure.

## 2024-07-17 NOTE — H&P PST ADULT - ASSESSMENT
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 (managed with a light machine at home and a RX cream - pt does not remember the name of the cream),Arrhythmia (H/O Cardiac Ablation X2), Hypercholesterolemia, HTN, Hyperlipidemia,  Abnormal EKG, Cardiomegaly, BLAKE, Type 2 DM, EVAN ( not currently using a CPAP machine), BPH, Gastric Ulcer, Diverticulitis/Diverticulosis ( last attack in 2010), Hearing loss ( wears bilateral hearing aids),  COVID-19 ( was hospitalized here in [place]);  now with  Unilateral Inguinal Hernia With Obstruction Without Gangrene Not Specified As Recurrent; presents today for PST prior to Repair LEFT Inguinal Hernia With MESH with Dr Siddharth Henriquez on 7/31/2024.     Pt notes "pulling and bulging" in left inguinal region. Pt notes he has had these SX for several years. Denies any N/V. Denies any changes on bowel or bladder habits. Following consult, exam and discussions with Dr Henriquez regarding treatment options pt is electing for scheduled procedure.

## 2024-07-17 NOTE — H&P PST ADULT - NSICDXPASTSURGICALHX_GEN_ALL_CORE_FT
PAST SURGICAL HISTORY:  H/O cardiac radiofrequency ablation     History of appendectomy     History of endoscopy     History of tonsillectomy

## 2024-07-17 NOTE — H&P PST ADULT - PATIENT OPTIMIZED PENDING
Pt notes he has been seen for medical, cardiac clearance - also discussed upcoming surgical procedure with Oncologist

## 2024-07-17 NOTE — H&P PST ADULT - PROBLEM SELECTOR PLAN 1
PST Labs; CBC ,BMP, HgB A1C, EKG. Pt notes he has been seen by his PCP, Cardiologist and Oncologist for clearances. Will fax over PST results for doctors to review.  Pt instructed to stop any NSAIDS/Herbal Supplements one week prior to procedure. May take Tylenol if needed for any pain between now and procedure.   Pt does see an Endocrinologist for his Diabetes however does not remember the doctors name. Discussed with pt if today's A1C comes back > 9 he would need to see Endo for clearance prior to procedure. Pt notes the only time he checks his blood glucose levels at home is if he is not feeling well.   Pt was instructed that his last dose of Jardiance prior to procedure would be on 7/27/2024. He was also instructed that his last dose of metformin prior to procedure would be on 7/29/2024.   Morning of procedure he was instructed he may take his Metoprolol, Amlodipine, Valsartan, Atorvastatin and Alfuzosin with small sips of water.   Due to patients H/O Cutaneous T-Cell Lymphoma  he was NOT given surgical scrubs. Instead he was instructed to wash on 7/29/24, 7/30/24 and 7/31/24 using Dial anti-bacterial soap. Understanding of instructions verbalized.   Pre-op instructions given to pt with understanding verbalized. All questions addressed with pt prior to him leaving the PST department today.

## 2024-07-17 NOTE — H&P PST ADULT - SKIN/BREAST COMMENTS
Does note prior H/O T-Cell Lymphoma (Cutaneous)- using light machine at home ( unsure if it is red light or green light) and a RX cream he is unsure of the name of

## 2024-07-17 NOTE — H&P PST ADULT - RESPIRATORY
clear to auscultation bilaterally/no respiratory distress/breath sounds equal/good air movement/respirations non-labored

## 2024-07-17 NOTE — H&P PST ADULT - GENERAL COMMENTS
Came back 2 weeks ago from [place]- Denies any recent/known exposure to COVID - denies any current COVID SX

## 2024-07-31 ENCOUNTER — APPOINTMENT (OUTPATIENT)
Dept: SURGERY | Facility: HOSPITAL | Age: 64
End: 2024-07-31

## 2024-08-15 ENCOUNTER — APPOINTMENT (OUTPATIENT)
Dept: SURGERY | Facility: CLINIC | Age: 64
End: 2024-08-15

## 2024-08-16 ENCOUNTER — EMERGENCY (EMERGENCY)
Facility: HOSPITAL | Age: 64
LOS: 1 days | Discharge: HOME | End: 2024-08-16
Attending: EMERGENCY MEDICINE | Admitting: EMERGENCY MEDICINE
Payer: COMMERCIAL

## 2024-08-16 VITALS
SYSTOLIC BLOOD PRESSURE: 144 MMHG | DIASTOLIC BLOOD PRESSURE: 88 MMHG | TEMPERATURE: 99 F | WEIGHT: 248.9 LBS | HEIGHT: 72 IN | OXYGEN SATURATION: 99 % | RESPIRATION RATE: 17 BRPM | HEART RATE: 84 BPM

## 2024-08-16 DIAGNOSIS — Z98.890 OTHER SPECIFIED POSTPROCEDURAL STATES: Chronic | ICD-10-CM

## 2024-08-16 DIAGNOSIS — Z90.49 ACQUIRED ABSENCE OF OTHER SPECIFIED PARTS OF DIGESTIVE TRACT: Chronic | ICD-10-CM

## 2024-08-16 DIAGNOSIS — Z90.89 ACQUIRED ABSENCE OF OTHER ORGANS: Chronic | ICD-10-CM

## 2024-08-16 LAB
ALBUMIN SERPL ELPH-MCNC: 3.8 G/DL — SIGNIFICANT CHANGE UP (ref 3.3–5)
ALP SERPL-CCNC: 95 U/L — SIGNIFICANT CHANGE UP (ref 40–120)
ALT FLD-CCNC: 146 U/L — HIGH (ref 12–78)
ANION GAP SERPL CALC-SCNC: 5 MMOL/L — SIGNIFICANT CHANGE UP (ref 5–17)
APPEARANCE UR: CLEAR — SIGNIFICANT CHANGE UP
AST SERPL-CCNC: 67 U/L — HIGH (ref 15–37)
BASOPHILS # BLD AUTO: 0.04 K/UL — SIGNIFICANT CHANGE UP (ref 0–0.2)
BASOPHILS NFR BLD AUTO: 0.5 % — SIGNIFICANT CHANGE UP (ref 0–2)
BILIRUB SERPL-MCNC: 1.4 MG/DL — HIGH (ref 0.2–1.2)
BILIRUB UR-MCNC: NEGATIVE — SIGNIFICANT CHANGE UP
BUN SERPL-MCNC: 17 MG/DL — SIGNIFICANT CHANGE UP (ref 7–23)
CALCIUM SERPL-MCNC: 9.8 MG/DL — SIGNIFICANT CHANGE UP (ref 8.5–10.1)
CHLORIDE SERPL-SCNC: 107 MMOL/L — SIGNIFICANT CHANGE UP (ref 96–108)
CO2 SERPL-SCNC: 27 MMOL/L — SIGNIFICANT CHANGE UP (ref 22–31)
COLOR SPEC: YELLOW — SIGNIFICANT CHANGE UP
CREAT SERPL-MCNC: 1.2 MG/DL — SIGNIFICANT CHANGE UP (ref 0.5–1.3)
DIFF PNL FLD: NEGATIVE — SIGNIFICANT CHANGE UP
EGFR: 67 ML/MIN/1.73M2 — SIGNIFICANT CHANGE UP
EOSINOPHIL # BLD AUTO: 0.17 K/UL — SIGNIFICANT CHANGE UP (ref 0–0.5)
EOSINOPHIL NFR BLD AUTO: 2 % — SIGNIFICANT CHANGE UP (ref 0–6)
GLUCOSE SERPL-MCNC: 126 MG/DL — HIGH (ref 70–99)
GLUCOSE UR QL: >=1000 MG/DL
HCT VFR BLD CALC: 39.1 % — SIGNIFICANT CHANGE UP (ref 39–50)
HGB BLD-MCNC: 12.1 G/DL — LOW (ref 13–17)
IMM GRANULOCYTES NFR BLD AUTO: 0.5 % — SIGNIFICANT CHANGE UP (ref 0–0.9)
KETONES UR-MCNC: NEGATIVE MG/DL — SIGNIFICANT CHANGE UP
LEUKOCYTE ESTERASE UR-ACNC: NEGATIVE — SIGNIFICANT CHANGE UP
LIDOCAIN IGE QN: 32 U/L — SIGNIFICANT CHANGE UP (ref 13–75)
LYMPHOCYTES # BLD AUTO: 1.79 K/UL — SIGNIFICANT CHANGE UP (ref 1–3.3)
LYMPHOCYTES # BLD AUTO: 21.3 % — SIGNIFICANT CHANGE UP (ref 13–44)
MCHC RBC-ENTMCNC: 23.4 PG — LOW (ref 27–34)
MCHC RBC-ENTMCNC: 30.9 GM/DL — LOW (ref 32–36)
MCV RBC AUTO: 75.8 FL — LOW (ref 80–100)
MONOCYTES # BLD AUTO: 0.11 K/UL — SIGNIFICANT CHANGE UP (ref 0–0.9)
MONOCYTES NFR BLD AUTO: 1.3 % — LOW (ref 2–14)
NEUTROPHILS # BLD AUTO: 6.27 K/UL — SIGNIFICANT CHANGE UP (ref 1.8–7.4)
NEUTROPHILS NFR BLD AUTO: 74.4 % — SIGNIFICANT CHANGE UP (ref 43–77)
NITRITE UR-MCNC: NEGATIVE — SIGNIFICANT CHANGE UP
NRBC # BLD: 0 /100 WBCS — SIGNIFICANT CHANGE UP (ref 0–0)
PH UR: 5.5 — SIGNIFICANT CHANGE UP (ref 5–8)
PLATELET # BLD AUTO: 234 K/UL — SIGNIFICANT CHANGE UP (ref 150–400)
POTASSIUM SERPL-MCNC: 4.3 MMOL/L — SIGNIFICANT CHANGE UP (ref 3.5–5.3)
POTASSIUM SERPL-SCNC: 4.3 MMOL/L — SIGNIFICANT CHANGE UP (ref 3.5–5.3)
PROT SERPL-MCNC: 8.2 G/DL — SIGNIFICANT CHANGE UP (ref 6–8.3)
PROT UR-MCNC: SIGNIFICANT CHANGE UP MG/DL
RBC # BLD: 5.16 M/UL — SIGNIFICANT CHANGE UP (ref 4.2–5.8)
RBC # FLD: 15.5 % — HIGH (ref 10.3–14.5)
SODIUM SERPL-SCNC: 139 MMOL/L — SIGNIFICANT CHANGE UP (ref 135–145)
SP GR SPEC: 1.02 — SIGNIFICANT CHANGE UP (ref 1–1.03)
UROBILINOGEN FLD QL: 0.2 MG/DL — SIGNIFICANT CHANGE UP (ref 0.2–1)
WBC # BLD: 8.42 K/UL — SIGNIFICANT CHANGE UP (ref 3.8–10.5)
WBC # FLD AUTO: 8.42 K/UL — SIGNIFICANT CHANGE UP (ref 3.8–10.5)

## 2024-08-16 PROCEDURE — 74177 CT ABD & PELVIS W/CONTRAST: CPT | Mod: 26,MC

## 2024-08-16 PROCEDURE — 99285 EMERGENCY DEPT VISIT HI MDM: CPT

## 2024-08-16 RX ORDER — BACTERIOSTATIC SODIUM CHLORIDE 0.9 %
1000 VIAL (ML) INJECTION ONCE
Refills: 0 | Status: COMPLETED | OUTPATIENT
Start: 2024-08-16 | End: 2024-08-16

## 2024-08-16 RX ADMIN — Medication 1000 MILLILITER(S): at 23:47

## 2024-08-16 NOTE — ED ADULT NURSE NOTE - NSICDXPASTSURGICALHX_GEN_ALL_CORE_FT
rolling walker
PAST SURGICAL HISTORY:  H/O cardiac radiofrequency ablation     History of appendectomy     History of endoscopy     History of tonsillectomy

## 2024-08-16 NOTE — ED PROVIDER NOTE - CLINICAL SUMMARY MEDICAL DECISION MAKING FREE TEXT BOX
64-year-old male with complaints of diffuse lower abdominal pain associated with urinary frequency and dysuria x 2 days.  Labs, UA, CT rule out diverticulitis, UTI, pyelonephritis, kidney stones

## 2024-08-16 NOTE — ED PROVIDER NOTE - OBJECTIVE STATEMENT
64-year-old male with significant past medical history for T-cell lymphoma, diabetes on methotrexate presents to the ED from the urgent care with complaints of lower abdominal pain and urinary frequency x 2 days.  Patient states history of diverticulitis.  Patient denies any nausea, vomiting, diarrhea, constipation.  UA in urgent care positive for glucose but no leukocytes.

## 2024-08-16 NOTE — ED PROVIDER NOTE - CARE PROVIDER_API CALL
Sanju Mobley  Gastroenterology  [address] [place] [id number]  Phone: [phone]  Fax: [phone]  Follow Up Time:

## 2024-08-16 NOTE — ED PROVIDER NOTE - NSFOLLOWUPINSTRUCTIONS_ED_ALL_ED_FT
Diverticulitis  Body outline showing the stomach and intestines, with a close-up of diverticula on the large intestine.  Diverticulitis is when small pouches in your colon get infected or swollen. This causes pain in your belly (abdomen) and watery poop (diarrhea).    The small pouches are called diverticula. They may form if you have a condition called diverticulosis.    What are the causes?  You may get this condition if poop (stool) gets trapped in the pouches in your colon. The poop lets germs (bacteria) grow. This causes an infection.    What increases the risk?  You are more likely to get this condition if you have small pouches in your colon. You are also more likely to get it if:  You are overweight or very overweight (obese).  You do not exercise enough.  You drink alcohol.  You smoke.  You eat a lot of red meat, like beef, pork, or lamb.  You do not eat enough fiber.  You are older than 40 years of age.  What are the signs or symptoms?  Pain in your belly. Pain is often on the left side, but it may be felt in other spots too.  Fever and chills.  Feeling like you may vomit.  Vomiting.  Having cramps.  Feeling full.  Changes in how often you poop.  Blood in your poop.  How is this treated?  Most cases are treated at home. You may be told to:  Take over-the-counter pain medicines.  Only eat and drink clear liquids.  Take antibiotics.  Rest.  Very bad cases may need to be treated at a hospital. Treatment may include:  Not eating or drinking.  Taking pain medicines.  Getting antibiotics through an IV tube.  Getting fluid and food through an IV tube.  Having surgery.  When you are feeling better, you may need to have a test to look at your colon (colonoscopy).    Follow these instructions at home:  Medicines    Take over-the-counter and prescription medicines only as told by your doctor. These include:  Fiber pills.  Probiotics.  Medicines to make your poop soft (stool softeners).  If you were prescribed antibiotics, take them as told by your doctor. Do not stop taking them even if you start to feel better.  Ask your doctor if you should avoid driving or using machines while you are taking your medicine.  Eating and drinking    Pear, berries, artichoke, and beans.  Follow the diet told by your doctor. You may need to only eat and drink liquids.  When you feel better, you may be able to eat more foods. You may also be told to eat a lot of fiber. Fiber helps you poop. Foods with fiber include berries, beans, lentils, and green vegetables.  Try not to eat red meat.  General instructions    Do not smoke or use any products that contain nicotine or tobacco. If you need help quitting, ask your doctor.  Exercise 3 or more times a week. Try to go for 30 minutes each time. Exercise enough to sweat and make your heart beat faster.  Contact a doctor if:  Your pain gets worse.  You are not pooping like normal.  Your symptoms do not get better.  Your symptoms get worse very fast.  You have a fever.  You vomit more than one time.  You have poop that is:  Bloody.  Black.  Tarry.  This information is not intended to replace advice given to you by your health care provider. Make sure you discuss any questions you have with your health care provider.    Document Revised: 09/14/2023 Document Reviewed: 09/14/2023  [employer] Patient Education © 2024 [employer]

## 2024-08-16 NOTE — ED ADULT NURSE NOTE - OBJECTIVE STATEMENT
64 y m presents to ED c/o frequent urination, patient denies flank pain, hematuria. nausea/vomiting, fevers

## 2024-08-16 NOTE — ED PROVIDER NOTE - PROVIDER TOKENS
Pt is calling and looking for a refill on her percocet, please advise.     Ila Mead, [place]      PROVIDER:[TOKEN:[[id number]]]

## 2024-08-16 NOTE — ED ADULT NURSE NOTE - NS PRO PASSIVE SMOKE EXP
[facility]  Outpatient Speech Language Pathology   [address], [address]  [place] [address]  Pediatric Speech and Language Treatment Note      Today's Visit Information         Patient Name: Yousuf Lambert      : 2021      MRN: [id number]           Visit Date: 2023          Visit Dx:  (F84.0) Autism    (F80.9) Speech delay    (F80.9) Developmental disorder of speech and language, unspecified    (F80.1) Expressive language delay    (R48.2) Childhood apraxia of speech    (F80.2) Receptive language delay          Patient seen for 84 sessions      Subjective    Yousuf was seen for speech and language therapy on today's date. Yousuf was accompanied to the session by his father. He transitioned to go with the therapist without difficulty.     Behavior(s) observed this date: alert, awake, cooperative, impulsive and happy.    Objective    Activities addressed during today's session:  Targeted iPad Raymundo, Book sharing, Sensory Motor Play, Routine speech games, Parent Education, Verbal Routines and Mobile puzzle.  Corey also enjoyed pretend play with dinosaur figurines and reading a pop up book targeting farm animal vocabulary and sea creature vocabulary.  Pretend play with food/kitchen items was presented as well.    Skilled therapeutic strategies incorporated by Speech Language Pathologist during today's session:  Language Therapy Strategies:  Alistair classifying cards paired with Video Touch vocabulary raymundo targeting transportation, Caregiver Education, Chaining, Directed practice, Errorless learning, First/then statements, Hand over hand assistance, Modeling, Parallel play, Parallel talk, Prompting Hierarchy, Reciprocal Play, Self-talk, Sign language, Tactile cues, Verbal cues and Visual cues.    Articulation Therapy Strategies: Modeling, Auditory bombardment, Visual cues and Guided Practice.    Therapeutic/Cognitive Interventions: attention compensatory strategies, memory  "strategies, executive functioning strategies and pragmatic functioning strategies.    Speech Goals      1. Pragmatics   LTG 1: Corey will demonstrate age appropriate pragmatics skills in all activities of daily living.    STATUS:  PROGRESSING       Stg1e: Corey will participate in a non-preferred turn-taking game 1x per session from start to finish without negative behaviors.   STATUS: GOAL MET 1/3/2023     2. Receptive Language   LTG 2: Corey will demonstrate 12 months growth in the are of receptive language in 12 chronological months.    STATUS:  PROGRESSING      STG 2a: Corey will point to a desired object or picture in 3 of 5 opportunities for 3 consecutive sessions.    STATUS:  GOAL MET 3/28/2023     STG2b: Corey will point to body parts upon request in 3 of 5 requests for 3 consecutive sessions.   STATUS: GOAL MET 5/9/2023      STG 2c: Corey will identify animals upon request in 8 of 10 requests for 3 consecutive sessions.   STATUS: GOAL MET 3/28/2023     STG 2d: Corey will identify animals by associated sounds with 80% accuracy for 3 consecutive sessions.   STATUS: GOAL MET 8/22/2023     STG 2e: Corey will follow directions with the quantity concept \"one\" with 80% accuracy and min cues for 3 consecutive sessions.   STATUS: PROGRESSING   9/20/2023: 50%, max cues (direct teaching)   10/11/2023: 70%, mod cues -Progrress note   10/25/2023: 70%, mod cues     STG 2f: Corey will follow directions with the quantity concept \"some\" with 80% accuracy and min cues for 3 consecutive sessions.   STATUS: NEW    STG 2g: Corey will demonstrate understanding of the concept \"not\" with 80% accuracy and min cues for 3 consecutive sessions.   STATUS: PROGRESSING   9/20/2023: direct teaching provided-no data   10/11/2023: 60%, max cues -Progress note   10/25/2023: 50%, mod cues    11/8/2023: 60%, max cues -Recertification   11/29/2023: 60%, max cues     3. Expressive Language    LTG 3: Corey will demonstrate 12 months growth in the are of expressive " "language in 12 chronological months.    STATUS:  PROGRESSING       STG 3b: Corey will imitate environmental sounds 3x per session for 3 consecutive sessions.    GOAL MET 2/21/2023      STG3d: Corey will label pictures of common vocabulary with 80% response rate for 3 consecutive sessions.   STATUS: PROGRESSING   2/14/2023: 2x-Recertification   2/21/2023: 0x   3/7/2023: 10x-food items during pretend play   3/14/2023: 4x   3/21/2023: 5x   3/28/2023: 5x   4/18/2023: 10x-Progress note   4/25/2023: 10x   5/9/2023: 90%, min cues -Recertification (animals)- Recertification   5/16/2023: 80%   5/23/2023: 80%   6/13/2023: 50%, max cues -Progress note   6/20/2023: 0%, max cues    7/11/2023: 50%, mod cues -Progress note   7/18/2023:  60%, mod cues    8/1/2023: 75%   8/8/2023: 80%, mod cues    9/7/2023: 80%, mod cues -Progress note   9/13/2023: 80%, mod cues    9/20/2023: 90%, mod cues    9/27/2023: 90%   10/11/2023: 80%-Halloween vocabulary-Progress note   10/25/2023: 80%   11/8/2023: 80%-Recertification   11/29/2023: 80%    STG 3e: Corey will describe a picture using 2-3 word phrases with min cues 5x per session for 3 consecutive sessions.   STATUS: PROGRESSING   9/13/2023: 1x, max cues    9/20/2023: 3x, mod cues    9/27/2023: 5x, mod cues    10/11/2023: 15x-Progress note   10/25/2023: 15X   11/8/2023: 15x-Recertification   11/29/2023: 15x     Language Scale 5th Edition (PLS-5)    Yousuf was administered the  Language Scale 5th Edition (PLS-5), a standardized assessment of expressive/receptive and total language development normed on peers of similar ages.     \"The PLS-5 is designed for use with children aged birth through 7:11 to assess language development and identify children who have a language delay or disorder. The test aims to identify receptive and expressive language skills in the areas of attention, gesture, play, vocal development, social communication, vocabulary, concepts, language structure, " "integrative language, and emergent literacy. The PLS-5 aids in determining strengths and weaknesses in these areas in order to determine the presence and type of language disorder, eligibility for services and to design interventions based on norm-referenced and criterion referenced scores.\"     Receptive language is the “input” of language, or the ability to listen to and comprehend spoken language that you hear or read. An example of the function of receptive language would be a child's ability to listen and follow directions (e.g. “Put on your shoes”). In typical development, children are able to understand language before they are able to produce it. Children who are unable to comprehend language may have receptive language difficulties or a receptive language disorder.    Expressive language is the “output” of language, or the ability to express your wants and needs through verbal or nonverbal communication. It is how we put our thoughts into words and sentences in a way that makes sense and uses correct grammar. Children that have difficulty communicating their wants and needs may have expressive language difficulties or an expressive language disorder. For example, children may have expressive language difficulties if they are unable to tell you what they want to play or when they are hungry.”    A standard score shows how your child's raw score (# of items mastered) differs from the average by converting the raw score to a score on a new scale. A standard score of 100 is average for a student's age or grade. Standard Scores within the range of  are considered to be within normal limits. Standard scores higher than 115 are above average, and those lower than 85 are below average. For example, if your child's standard score is 110, this indicates a high average performance on the test. If the score is 89, that indicates a low average performance.     A percentile rank indicates the percentage of students " in the group tested who performed at or below your child's score. For example, if your child's percentile is 64, this means that he or she performed as well, or better than, 64% of the children of the same age or in the same grade. A percentile ranking is a standardized test score that allows the child's performance on a specific task(s) to be compared to 99 same-age peers with 1 being the weakest and 100 being the best performance.  A percentile ranking between 16 and 84 is considered to be within normal limits; however, between 15 to 25 is considered to be a borderline delay.  Percentile rankings of 7 to 14 represent a mild delay; 2 to 6 is a moderate delay; < 2 is a severe delay.     The age equivalent identifies the age in years and months for which the score was the mean for that age group (ie. the point at which 50% of the children in the same age range get higher scores and 50% get lower scores). For example, if your 9-year-old child scores a 42 raw score on a test, and that score is average for 8-year-olds, their age equivalent score would be 8.      Yousuf's results are as follows:       Raw Score Standard Score Percentile Rank Age Equivalent   Auditory Comprehension 31 66 1% 2-4   Expressive Communication 31 69 2% 2-4   Total Language Score 135 65 1% 2-4        PROGRESS NOTE DUE BY:    12/8/2023    Assessment     Patient is progressing with targeted goals to facilitate increased receptive language skills (understanding what is said to him), expressive language skills (communicating their wants and needs to others with gestures, AAC or spoken language), pragmatic skills (how they interact and communicate socially with others) and AAC skills (independently using Augmentative Alternative Communication to express their wants and needs) to communicate effectively with medical professionals and communication partners in all activities of daily living across all settings.  Corey demonstrated increased use of  simple sentences and increased variety of vocabulary throughout today's session even within structured turn taking play interactions.  Increased use of spontaneous 3-4 word sentences observed throughout today's session.  Plan     Continue with speech and language therapy to allow for improved independence in communicating wants and needs during ADLs per patient's plan of care.    Home program activities:   Discussed with caregiver and/or sent home program activities in speech folder including: Language acquisition activities, Early language carryover techniques and Instructions for carryover of targeted skills into Activities of Daily Living to facilitate generalization of skills to new environments.      Plan of Care: Continue Speech Therapy 1 time(s) per week for 12 weeks.   Rehabilitation Potential: Excellent      Billed Treatment Time    Un-timed Minutes: 45      Serena De Souza MA, CCC/SLP  12/4/2023  [place] License #: [id number]  NPI # [id number]    Electronically Signed          CERTIFICATION PERIOD: 11/13/2023 through 2/10/2024        Unknown

## 2024-08-16 NOTE — ED PROVIDER NOTE - PATIENT PORTAL LINK FT
You can access the FollowMyHealth Patient Portal offered by [facility] by registering at the following website: [email or web address]. By joining [facility]’s FollowMyHealth portal, you will also be able to view your health information using other applications (apps) compatible with our system.

## 2024-08-16 NOTE — ED PROVIDER NOTE - NSICDXPASTMEDICALHX_GEN_ALL_CORE_FT
PAST MEDICAL HISTORY:  2019 novel coronavirus disease (COVID-19)     Abnormal EKG     BPH (benign prostatic hyperplasia)     Cardiomegaly     Dyspnea on exertion     Gastric ulcer     History of use of hearing aid in both ears     HLD (hyperlipidemia)     HTN (hypertension)     Hypercholesterolemia     Type 2 diabetes mellitus     Unilateral inguinal hernia, with obstruction, without gangrene, not specified as recurrent

## 2024-08-17 VITALS
TEMPERATURE: 98 F | OXYGEN SATURATION: 97 % | SYSTOLIC BLOOD PRESSURE: 139 MMHG | DIASTOLIC BLOOD PRESSURE: 84 MMHG | RESPIRATION RATE: 18 BRPM | HEART RATE: 79 BPM

## 2024-08-17 PROBLEM — R94.31 ABNORMAL ELECTROCARDIOGRAM [ECG] [EKG]: Chronic | Status: ACTIVE | Noted: 2024-07-17

## 2024-08-17 PROBLEM — E78.00 PURE HYPERCHOLESTEROLEMIA, UNSPECIFIED: Chronic | Status: ACTIVE | Noted: 2024-07-17

## 2024-08-17 PROBLEM — Z92.89 PERSONAL HISTORY OF OTHER MEDICAL TREATMENT: Chronic | Status: ACTIVE | Noted: 2024-07-17

## 2024-08-17 PROBLEM — U07.1 COVID-19: Chronic | Status: ACTIVE | Noted: 2024-07-17

## 2024-08-17 PROBLEM — K40.30 UNILATERAL INGUINAL HERNIA, WITH OBSTRUCTION, WITHOUT GANGRENE, NOT SPECIFIED AS RECURRENT: Chronic | Status: ACTIVE | Noted: 2024-07-17

## 2024-08-17 PROCEDURE — 36415 COLL VENOUS BLD VENIPUNCTURE: CPT

## 2024-08-17 PROCEDURE — 85025 COMPLETE CBC W/AUTO DIFF WBC: CPT

## 2024-08-17 PROCEDURE — 81003 URINALYSIS AUTO W/O SCOPE: CPT

## 2024-08-17 PROCEDURE — 99284 EMERGENCY DEPT VISIT MOD MDM: CPT | Mod: 25

## 2024-08-17 PROCEDURE — 80053 COMPREHEN METABOLIC PANEL: CPT

## 2024-08-17 PROCEDURE — 74177 CT ABD & PELVIS W/CONTRAST: CPT | Mod: MC

## 2024-08-17 PROCEDURE — 83690 ASSAY OF LIPASE: CPT

## 2024-08-17 RX ORDER — METRONIDAZOLE 500 MG/1
500 TABLET ORAL ONCE
Refills: 0 | Status: COMPLETED | OUTPATIENT
Start: 2024-08-17 | End: 2024-08-17

## 2024-08-17 RX ORDER — CIPROFLOXACIN 500 MG/1
1 TABLET, FILM COATED ORAL
Qty: 20 | Refills: 0
Start: 2024-08-17 | End: 2024-08-26

## 2024-08-17 RX ORDER — CIPROFLOXACIN 500 MG/1
500 TABLET, FILM COATED ORAL ONCE
Refills: 0 | Status: COMPLETED | OUTPATIENT
Start: 2024-08-17 | End: 2024-08-17

## 2024-08-17 RX ORDER — METRONIDAZOLE 500 MG/1
1 TABLET ORAL
Qty: 40 | Refills: 0
Start: 2024-08-17 | End: 2024-08-26

## 2024-08-17 RX ADMIN — METRONIDAZOLE 500 MILLIGRAM(S): 500 TABLET ORAL at 01:03

## 2024-08-17 RX ADMIN — CIPROFLOXACIN 500 MILLIGRAM(S): 500 TABLET, FILM COATED ORAL at 01:03

## 2024-08-17 NOTE — ED ADULT NURSE REASSESSMENT NOTE - NS ED NURSE REASSESS COMMENT FT1
Patient received alert and orientedx4, ambulatory with steady gait. IVF administered per MD order. All medications tolerated per MD order. Patient to be discharged home. VS recorded. Call light In reach. Fall precautions maintained. All patient safety maintained

## 2024-11-18 ENCOUNTER — APPOINTMENT (OUTPATIENT)
Dept: SURGERY | Facility: CLINIC | Age: 64
End: 2024-11-18
Payer: COMMERCIAL

## 2024-11-18 VITALS
RESPIRATION RATE: 14 BRPM | WEIGHT: 250 LBS | TEMPERATURE: 98.3 F | BODY MASS INDEX: 33.86 KG/M2 | HEART RATE: 88 BPM | SYSTOLIC BLOOD PRESSURE: 125 MMHG | HEIGHT: 72 IN | OXYGEN SATURATION: 98 % | DIASTOLIC BLOOD PRESSURE: 80 MMHG

## 2024-11-18 DIAGNOSIS — K40.90 UNILATERAL INGUINAL HERNIA, W/OUT OBSTRUCTION OR GANGRENE, NOT SPECIFIED AS RECURRENT: ICD-10-CM

## 2024-11-18 DIAGNOSIS — R10.32 LEFT LOWER QUADRANT PAIN: ICD-10-CM

## 2024-11-18 PROCEDURE — 99214 OFFICE O/P EST MOD 30 MIN: CPT

## 2024-11-19 NOTE — ED ADULT NURSE REASSESSMENT NOTE - VOIDING
[de-identified] : Cervical Spine Exam:   Inspection: Erythema: (-) Ecchymosis: (-) Rashes: (-) Palpation: Trapezius spasms (+) Trapezius tenderness (+) Paracervical spasms (+)  Paracervical tenderness (+) Rhomboid spasms (-) Rhomboid tenderness (-)   Cervical  ROM Limited ROM and pain with Extension Limited ROM and pain with LT and RT rotation   Strength: Deltoid: R (5/5) L (5/5) Biceps: R (5/5) L (5/5) Triceps: R (5/5) L (5/5) Wrist flexors: R (5/5) L (5/5) Finger flexors: R (5/5) L (5/5) Grasp: R (5/5) L (5/5)   Special Tests: Spurling: R (-) ; L (+) Facet loading (Monahan's test): R (+) ; L (+) Mcgregor reflex: R (+) ; L (-)   Neurologic: Light touch intact throughout LE Reflexes normal and symmetric
frequency

## 2024-12-20 ENCOUNTER — OUTPATIENT (OUTPATIENT)
Dept: OUTPATIENT SERVICES | Facility: HOSPITAL | Age: 64
LOS: 1 days | End: 2024-12-20
Payer: COMMERCIAL

## 2024-12-20 VITALS
WEIGHT: 257.94 LBS | RESPIRATION RATE: 16 BRPM | TEMPERATURE: 98 F | DIASTOLIC BLOOD PRESSURE: 72 MMHG | HEIGHT: 72 IN | OXYGEN SATURATION: 96 % | SYSTOLIC BLOOD PRESSURE: 124 MMHG | HEART RATE: 84 BPM

## 2024-12-20 DIAGNOSIS — K40.90 UNILATERAL INGUINAL HERNIA, WITHOUT OBSTRUCTION OR GANGRENE, NOT SPECIFIED AS RECURRENT: ICD-10-CM

## 2024-12-20 DIAGNOSIS — Z98.890 OTHER SPECIFIED POSTPROCEDURAL STATES: Chronic | ICD-10-CM

## 2024-12-20 DIAGNOSIS — Z01.818 ENCOUNTER FOR OTHER PREPROCEDURAL EXAMINATION: ICD-10-CM

## 2024-12-20 DIAGNOSIS — Z90.89 ACQUIRED ABSENCE OF OTHER ORGANS: Chronic | ICD-10-CM

## 2024-12-20 DIAGNOSIS — Z90.49 ACQUIRED ABSENCE OF OTHER SPECIFIED PARTS OF DIGESTIVE TRACT: Chronic | ICD-10-CM

## 2024-12-20 DIAGNOSIS — K40.30 UNILATERAL INGUINAL HERNIA, WITH OBSTRUCTION, WITHOUT GANGRENE, NOT SPECIFIED AS RECURRENT: ICD-10-CM

## 2024-12-20 LAB
A1C WITH ESTIMATED AVERAGE GLUCOSE RESULT: 6.1 % — HIGH (ref 4–5.6)
ALBUMIN SERPL ELPH-MCNC: 3.6 G/DL — SIGNIFICANT CHANGE UP (ref 3.3–5)
ALP SERPL-CCNC: 95 U/L — SIGNIFICANT CHANGE UP (ref 40–120)
ALT FLD-CCNC: 138 U/L — HIGH (ref 12–78)
ANION GAP SERPL CALC-SCNC: 7 MMOL/L — SIGNIFICANT CHANGE UP (ref 5–17)
AST SERPL-CCNC: 76 U/L — HIGH (ref 15–37)
BILIRUB SERPL-MCNC: 0.7 MG/DL — SIGNIFICANT CHANGE UP (ref 0.2–1.2)
BUN SERPL-MCNC: 12 MG/DL — SIGNIFICANT CHANGE UP (ref 7–23)
CALCIUM SERPL-MCNC: 9.2 MG/DL — SIGNIFICANT CHANGE UP (ref 8.5–10.1)
CHLORIDE SERPL-SCNC: 110 MMOL/L — HIGH (ref 96–108)
CO2 SERPL-SCNC: 25 MMOL/L — SIGNIFICANT CHANGE UP (ref 22–31)
CREAT SERPL-MCNC: 1.1 MG/DL — SIGNIFICANT CHANGE UP (ref 0.5–1.3)
EGFR: 74 ML/MIN/1.73M2 — SIGNIFICANT CHANGE UP
ESTIMATED AVERAGE GLUCOSE: 128 MG/DL — HIGH (ref 68–114)
GLUCOSE SERPL-MCNC: 165 MG/DL — HIGH (ref 70–99)
HCT VFR BLD CALC: 35.1 % — LOW (ref 39–50)
HGB BLD-MCNC: 11.4 G/DL — LOW (ref 13–17)
MCHC RBC-ENTMCNC: 26.1 PG — LOW (ref 27–34)
MCHC RBC-ENTMCNC: 32.5 G/DL — SIGNIFICANT CHANGE UP (ref 32–36)
MCV RBC AUTO: 80.5 FL — SIGNIFICANT CHANGE UP (ref 80–100)
NRBC # BLD: 0 /100 WBCS — SIGNIFICANT CHANGE UP (ref 0–0)
PLATELET # BLD AUTO: 342 K/UL — SIGNIFICANT CHANGE UP (ref 150–400)
POTASSIUM SERPL-MCNC: 4 MMOL/L — SIGNIFICANT CHANGE UP (ref 3.5–5.3)
POTASSIUM SERPL-SCNC: 4 MMOL/L — SIGNIFICANT CHANGE UP (ref 3.5–5.3)
PROT SERPL-MCNC: 7.2 G/DL — SIGNIFICANT CHANGE UP (ref 6–8.3)
RBC # BLD: 4.36 M/UL — SIGNIFICANT CHANGE UP (ref 4.2–5.8)
RBC # FLD: 20.3 % — HIGH (ref 10.3–14.5)
SODIUM SERPL-SCNC: 142 MMOL/L — SIGNIFICANT CHANGE UP (ref 135–145)
WBC # BLD: 5.85 K/UL — SIGNIFICANT CHANGE UP (ref 3.8–10.5)
WBC # FLD AUTO: 5.85 K/UL — SIGNIFICANT CHANGE UP (ref 3.8–10.5)

## 2024-12-20 PROCEDURE — 36415 COLL VENOUS BLD VENIPUNCTURE: CPT

## 2024-12-20 PROCEDURE — 83036 HEMOGLOBIN GLYCOSYLATED A1C: CPT

## 2024-12-20 PROCEDURE — G0463: CPT

## 2024-12-20 PROCEDURE — 85027 COMPLETE CBC AUTOMATED: CPT

## 2024-12-20 PROCEDURE — 80053 COMPREHEN METABOLIC PANEL: CPT

## 2024-12-20 RX ORDER — PROCHLORPERAZINE EDISYLATE 5 MG/ML
1 INJECTION INTRAMUSCULAR; INTRAVENOUS
Refills: 0 | DISCHARGE

## 2024-12-20 RX ORDER — HYDROCHLOROTHIAZIDE 50 MG
1 TABLET ORAL
Refills: 0 | DISCHARGE

## 2024-12-20 RX ORDER — METHOTREXATE 2.5 MG/1
4 TABLET ORAL
Refills: 0 | DISCHARGE

## 2024-12-20 RX ORDER — ALFUZOSIN HYDROCHLORIDE 10 MG/1
1 TABLET ORAL
Refills: 0 | DISCHARGE

## 2024-12-20 RX ORDER — CYANOCOBALAMIN/FOLIC AC/VIT B6 1-2.2-25MG
0 TABLET ORAL
Refills: 0 | DISCHARGE

## 2024-12-20 RX ORDER — VALSARTAN 320 MG/1
1 TABLET ORAL
Refills: 0 | DISCHARGE

## 2024-12-20 RX ORDER — EMPAGLIFLOZIN 25 MG/1
1 TABLET, FILM COATED ORAL
Refills: 0 | DISCHARGE

## 2024-12-20 RX ORDER — METOPROLOL TARTRATE 100 MG/1
1 TABLET, FILM COATED ORAL
Refills: 0 | DISCHARGE

## 2024-12-20 RX ORDER — AMLODIPINE BESYLATE 10 MG/1
1 TABLET ORAL
Refills: 0 | DISCHARGE

## 2024-12-20 RX ORDER — GLUCOSAMINE SULFATE DIPOT CHLR 500 MG
1 CAPSULE ORAL
Refills: 0 | DISCHARGE

## 2024-12-20 RX ORDER — PANTOPRAZOLE SODIUM 40 MG/1
1 TABLET, DELAYED RELEASE ORAL
Refills: 0 | DISCHARGE

## 2024-12-20 NOTE — H&P PST ADULT - NSSUBSTANCEUSE_GEN_ALL_CORE_SD
"2021      RE: Boogie Villa  [address] [facility] [address]         Nephrology Clinic    Michael Diaz MD  2021     Name: Boogie Villa  MRN: [id number]  Age: 17 year old  : 2002  Referring provider: Edwin Palomo     Assessment and Plan:   1. 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  The evaluation today continues to be concerning with continued elevation of serum creatinine, which is 3.8 today, elevated from 3.3 on 18. GFR decreased to 22 ml/min today. Serum albumin continues to be low with nephrotic range urine protein excretion  at ~4.1 g/g today. Hemoglobin is stable . I discussed the possibility of a kidney transplant and the efficacy of a live vs  donor. Patient inquires about anti-rejection medication and I counseled him and his mother about the risk for infection and cancer (and importance of regular dermatology follow-up). He will be eligible for a transplant listing once his GFR falls below 20 ml/min (22 ml/min today). I also discussed the possible use of peritoneal dialysis vs home hemodialysis and in-center hemodialysis.   - continue RAAS blockade with ramipril  but will increase from 1.25 to 2.5 mg daily (with repeat renal panel in 1-2 weeks) for management of persistent albuminuria and CKD  - He should continue to avoid NSAIDs  - If he becomes ill, he was instructed to hold his ramipril   - We will pursue transplant evaluation should his eGFR fall <20 ml/min  - Will attempt to get him enrolled in the \"Kidney Smart\" class for additional education  - RTC in 4 months     2. Hypertension: Slightly above goal of <130/80 in clinic.  Euvolemic on exam.    - Continued on atenolol 37.5 mg, amlodipine 5 mg and will increase ramipril 1.25 to 2.5 mg daily; repeat renal panel in 1-2 weeks  - Anticipate discontinuing atenolol and increasing " ramipril next for management hypertension and albuminuria     3. CKD-MBD: Corrected Ca and phos ar goal.  PTH mildly elevated at 154.  Vit D deficient and low normal today.   - Continue cholecalciferol at 200 units daily  - No need for phos binders or activated vitamin D (e.g. calcitriol) at this time     4. Low bicarb:  Bicarb low at 17 today.  Presumed non-AG metabolic acidosis due to CKD.    -encourage compliance with sodium bicarb tabs (650 mg) 4 tabs TID     5. Anemia: Mild and due to CKD. Hgb at goal (12.4 today). Iron stores adequate.   -no need for iron or EPO at this time      6. Seasonal Allergies: Patient dose not complain of persistent seasonal allergy related symptoms today. He feels that this is being well managed with Claritin. Instructed him that it is ok to take it daily for a short period of time if needed.  Otherwise, we discussed prescribing Singulair in the future if he continues to be symptomatic.     7. Obesity: Significant increase in weight during 2018. Patient and his mother inquire about the possible effect weight will have on a possible kidney transplant. I discussed the implications obesity has on chronic kidney disease, blood pressure control, and heart disease. Goal BMI for kidney transplant should be less than 35 (current 38). If this becomes an issue, he may follow-up with our weight management clinic.       8. Urology: Followed by Dr. Melgar      Follow-up: Return in about 4 months (around 3/25/2020).     Reason For Visit:   Follow-up.     HPI:   Boogie Villa is a 17 year old male with a history of posterior urethral valves with correction in the first year of life.  Recurrent episodes of urinary tract infection. Patient was last seen by Dr. Palomo in pediatric nephrology for follow-up of chronic kidney disease related to posterior ureteral valves. The only concern was medication compliance and he and his mother reported very partial (3-4 days a week) compliance. Plan at that  time was to transfer care to adult nephrology.     Interval history:  The patient is doing well overall. He reports that he started classes at college and he began working at a job. He reports that he gets catheterized every other day. He states that he does not feel like there is a lot of urine in his bladder. He would not like an overnight catheterization because it causes him too much pain. He reports that he does not produce a large amount of urine every time he catheterizes himself. He reports that he produces less than a liter of urine.      The mother of the patient had some questions about kidney disease. She is wondering if there was a way to teach her son more about what kidney disease looks like and the consequences of the kidney transplant. She also asked when someone experiences the symptoms of chronic kidney disease. She is worried that her son does not understand the consequences of not adhering to his medication and the long term effects this may have.     Review of Systems:   Pertinent items are noted in HPI or as below, remainder of complete ROS is negative.      Active Medications:     Current Outpatient Medications:      amLODIPine (NORVASC) 5 MG tablet, Take 1 tablet (5 mg) by mouth daily, Disp: 90 tablet, Rfl: 3     atenolol (TENORMIN) 25 MG tablet, Take 1.5 tablets (37.5 mg) by mouth daily, Disp: 135 tablet, Rfl: 3     calcium carbonate (OSCAL 500) 1250 (500 Ca) MG TABS tablet, , Disp: , Rfl:      Cholecalciferol (VITAMIN D) 2000 units CAPS, , Disp: , Rfl:      ramipril (ALTACE) 1.25 MG capsule, Take 2 capsules (2.5 mg) by mouth daily, Disp: 30 capsule, Rfl: 11     sodium bicarbonate 650 MG tablet, Take 4 tablets (2,600 mg) by mouth 3 times daily, Disp: 180 tablet, Rfl: 3     FLUoxetine (PROZAC) 40 MG capsule, , Disp: , Rfl:      OXYBUTYNIN CHLORIDE, 10 mg daily., Disp: , Rfl:      Allergies:   Dust mites; Mold; and Other [seasonal allergies]      Past Medical History:  Attention deficit  disorder    Posterior urethral valves   Lymphangioma  Hypertension  Chronic kidney disease stage 4, GFR 15-29 ml/min (H)    Past Surgical History:  ablation of posterior urethral valves - 2000  ureteral reimplantation with tapering - 2003    Family History:   History reviewed. No pertinent family history.      Social History:   Presents to clinic alone.  Tobacco Use: Never smoker.  Alcohol Use: Not on file.  PCP: VALENTIN MEEKS    Physical Exam:  /88   Pulse 70   Temp 97.8  F (36.6  C)   Wt 123 kg (271 lb 1.6 oz)   SpO2 98%   BMI 37.81 kg/m      GENERAL APPEARANCE: alert and no distress  EYES: PERRL, no scleral icterus  HENT: mouth without ulcers or lesions  NECK: supple, no adenopathy  RESP: lungs clear to auscultation   CV: regular rhythm, normal rate, no rub  GI: abd soft, NT, ND  : no CVA tenderness  Extremities: no edema  SKIN: no concerning rash  NEURO: mentation intact and speech normal  PSYCH: affect normal/bright      Laboratory:  CMP  Recent Labs   Lab Test 11/25/19  0859 05/13/19  0712 12/04/18  1054 06/08/18  1256 01/16/18  1102 08/09/17  1626 02/10/17  1004    144 142 148* 144 146*  --    POTASSIUM 4.9 4.3 4.6 4.8 5.0 5.0  --    CHLORIDE 117* 116* 116* 121* 113* 120*  --    CO2 17* 20 17* 19* 25 14*  --    ANIONGAP 6 8 9 8 6 12  --    GLC 94 114* 100* 87 79 97  --    BUN 57* 54* 56* 47* 37* 50*  --    CR 3.80* 3.59* 3.33* 2.51* 2.40* 2.39*  --    GFRESTIMATED 22* 23* 24* 34* 36* 36*  --    GFRESTBLACK 25* 27* 29* 41* 43* 43*  --    NABIL 8.5 8.7* 8.8* 8.5* 8.6* 8.7*  --    MAG  --   --  1.9 1.6 1.8 1.9  --    PHOS 4.1 4.1 4.7* 4.6 4.0 3.8  --    PROTTOTAL  --   --  6.9 6.4*  --   --   --    ALBUMIN 3.1* 3.2* 3.1* 2.8* 3.0* 3.4  --    BILITOTAL  --   --  0.2 0.2  --   --   --    ALKPHOS  --   --  87 74  --  91  --    AST  --   --  20 20  --   --  24   ALT  --   --  24 19  --   --  18     CBC  Recent Labs   Lab Test 11/25/19  0859 05/13/19  0712 12/04/18  1054 06/08/18  1256  01/16/18  1102   HGB 12.4* 12.7* 13.7 13.3 14.8   WBC  --  5.8 5.0 5.8 4.8   RBC  --  4.07* 4.51 4.24* 4.76   HCT  --  38.2* 41.4 39.1* 44.5   MCV  --  94 92 92 94   MCH  --  31.2 30.4 31.4 31.1   MCHC  --  33.2 33.1 34.0 33.3   RDW  --  12.5 12.6 12.6 12.2   PLT  --  232 232 201 223     INR  No lab results found.  ABG  No lab results found.   URINE STUDIES  Recent Labs   Lab Test 05/13/19  0728 12/04/18  1056 06/08/18  1310 02/10/17  1004   COLOR Straw Straw Straw Straw   APPEARANCE Clear Clear Clear Clear   URINEGLC 50* Negative Negative Negative   URINEBILI Negative Negative Negative Negative   URINEKETONE Negative Negative Negative Negative   SG 1.008 1.006 1.006 1.004   UBLD Negative Trace* Negative Negative   URINEPH 7.0 6.5 6.5 6.5   PROTEIN >499* 100* 100* 100*   NITRITE Negative Negative Negative Negative   LEUKEST Negative Negative Negative Negative   RBCU <1 <1 <1 <1   WBCU <1 1 <1 2     Recent Labs   Lab Test 11/25/19  0904 05/13/19  0728 12/04/18  1056 06/08/18  1310 02/10/17  1004 08/26/16  1150 02/19/16  1000   UTPG 4.92* 4.31* 4.59* 6.69* 2.78* 2.45* 2.17*     PTH  Recent Labs   Lab Test 11/25/19  0859 05/13/19  0712 12/04/18  1054 06/08/18  1256 01/16/18  1102 08/09/17  1626 02/10/17  1002 08/26/16  1245 02/19/16  0955   PTHI 154* 110* 177* 96* 133* 66 39 47 42     IRON STUDIES   Recent Labs   Lab Test 05/13/19  0712 06/08/18  1256 01/16/18  1102 08/09/17  1626 02/10/17  1002   IRON 71 87 116 93 54    257 290 284 297   IRONSAT 26 34 40 33 18   DAVID 109 82 86 100 113           Scribe Disclosure:   ISenthil, am serving as a scribe to document services personally performed by Michael Diaz MD at this visit, based upon the provider's statements to me. All documentation has been reviewed by the aforementioned provider prior to being entered into the official medical record.     ISenthil, served as a scribe preparing the chart for the clinic encounter through  chart review for the provider team.     Total time spent was >40 minutes, and more than 50% of face to face time was spent in counseling and/or coordination of care regarding principles of multidisciplinary care, medication management, and chronic kidney disease education.  Michael Diaz MD         caffeine

## 2024-12-20 NOTE — H&P PST ADULT - HISTORY OF PRESENT ILLNESS
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  Patient states he has the hernia pulling sensation in left groin for the past 2 years.  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  Patient states he has had a  pulling sensation in left groin for the past 2 years.  Denies pain, nausea or vomiting

## 2024-12-20 NOTE — H&P PST ADULT - NEGATIVE ENMT SYMPTOMS
no tinnitus/no vertigo/no nasal congestion/no gum bleeding/no dry mouth/no throat pain/no dysphagia no tinnitus/no vertigo/no sinus symptoms/no nasal congestion/no nasal discharge/no nasal obstruction/no gum bleeding/no dry mouth/no throat pain/no dysphagia

## 2024-12-20 NOTE — H&P PST ADULT - PROBLEM SELECTOR PLAN 1
medical clearance  cardiology clearance  oncology clearance   ekg  cbc cmp hgb a1c   preop instructions were given   Hibiclens x3 days with written and verbal instructions given  stop Aspirin Multivitamin CoQ 10  Multivitamin  12/27  Hold Jardiance 1/31, 1/1/, 1/2, 1/3   Preop instructions were given  patient is aware that he may be monitored for sleep apnea for 3-6 hours post op  patient verbalizes understanding of instructions

## 2024-12-20 NOTE — H&P PST ADULT - NSICDXFAMILYHX_GEN_ALL_CORE_FT
FAMILY HISTORY:  Father  Still living? No  Family history of CABG, Age at diagnosis: Age Unknown  Family history of coronary artery disease, Age at diagnosis: Age Unknown  Family history of lung cancer, Age at diagnosis: Age Unknown  FH: diabetes mellitus, Age at diagnosis: Age Unknown    Mother  Still living? No  Family history of bone cancer, Age at diagnosis: Age Unknown  FH: diabetes mellitus, Age at diagnosis: Age Unknown

## 2024-12-20 NOTE — H&P PST ADULT - RX
does not use Sleep Apnea does not use Sleep Apnea CPAP since he started elevating the head of his bed   [facility] Pulmonologist Dr ibarra over one year ago

## 2024-12-20 NOTE — H&P PST ADULT - LAST STRESS TEST
Debridement Text: The wound edges were debrided prior to proceeding with the closure to facilitate wound healing. 2024

## 2024-12-20 NOTE — H&P PST ADULT - NSICDXPASTMEDICALHX_GEN_ALL_CORE_FT
PAST MEDICAL HISTORY:  2019 novel coronavirus disease (COVID-19)     Abnormal EKG     BPH (benign prostatic hyperplasia)     Cardiomegaly     Dyspnea on exertion     Gastric ulcer     History of use of hearing aid in both ears     HLD (hyperlipidemia)     HTN (hypertension)     Hypercholesterolemia     Type 2 diabetes mellitus     Unilateral inguinal hernia without obstruction or gangrene     Unilateral inguinal hernia, with obstruction, without gangrene, not specified as recurrent      PAST MEDICAL HISTORY:  2019 novel coronavirus disease (COVID-19)     Abnormal EKG     BPH (benign prostatic hyperplasia)     Cardiomegaly     Dyspnea on exertion     Gastric ulcer     H/O sleep apnea     History of use of hearing aid in both ears     HLD (hyperlipidemia)     HTN (hypertension)     Hypercholesterolemia     Skin cancer     Type 2 diabetes mellitus     Unilateral inguinal hernia without obstruction or gangrene     Unilateral inguinal hernia, with obstruction, without gangrene, not specified as recurrent

## 2024-12-20 NOTE — H&P PST ADULT - ASSESSMENT
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

## 2025-01-03 ENCOUNTER — APPOINTMENT (OUTPATIENT)
Dept: SURGERY | Facility: HOSPITAL | Age: 65
End: 2025-01-03

## 2025-01-21 ENCOUNTER — APPOINTMENT (OUTPATIENT)
Dept: SURGERY | Facility: CLINIC | Age: 65
End: 2025-01-21

## 2025-01-29 PROBLEM — Z86.69 PERSONAL HISTORY OF OTHER DISEASES OF THE NERVOUS SYSTEM AND SENSE ORGANS: Chronic | Status: ACTIVE | Noted: 2024-12-20

## 2025-01-29 PROBLEM — C44.90 UNSPECIFIED MALIGNANT NEOPLASM OF SKIN, UNSPECIFIED: Chronic | Status: ACTIVE | Noted: 2024-12-20

## 2025-01-29 PROBLEM — K40.90 UNILATERAL INGUINAL HERNIA, WITHOUT OBSTRUCTION OR GANGRENE, NOT SPECIFIED AS RECURRENT: Chronic | Status: ACTIVE | Noted: 2024-12-20

## 2025-02-27 NOTE — ASU PATIENT PROFILE, ADULT - CAREGIVER RELATION TO PATIENT
"3/26/2019 Dim[facility] 3 and 6  Group Chart Note - Co-facilitated with Shanika Henderson [facility].  Number of clients attending the group:  7      Landyjosselyn EMMA Dunaway attended 1 hour Dual Process group covering the following topics Interpersonal Effectiveness and Emotion Regulation.  Client was Actively participating and Distracted.  Client's response:  Client participated in group focusing on cognitive distortions and emotions related to such thoughts. Client sat in the corner of the group, but remained engaged. She was able to name some common cognitive distortions independently. She stated, \"everyone has feelings, but some people are numb to them.\"    Traci Cardona, RAY/Psychotherapist Intern    " wife

## 2025-02-27 NOTE — ASU PATIENT PROFILE, ADULT - FALL HARM RISK - UNIVERSAL INTERVENTIONS
Bed in lowest position, wheels locked, appropriate side rails in place/Call bell, personal items and telephone in reach/Instruct patient to call for assistance before getting out of bed or chair/Non-slip footwear when patient is out of bed/[place] to call system/Physically safe environment - no spills, clutter or unnecessary equipment/Purposeful Proactive Rounding/Room/bathroom lighting operational, light cord in reach

## 2025-02-27 NOTE — ASU PATIENT PROFILE, ADULT - NSICDXPASTMEDICALHX_GEN_ALL_CORE_FT
PAST MEDICAL HISTORY:  2019 novel coronavirus disease (COVID-19)     Abnormal EKG     BPH (benign prostatic hyperplasia)     Cardiomegaly     Dyspnea on exertion     Gastric ulcer     H/O sleep apnea     History of use of hearing aid in both ears     HLD (hyperlipidemia)     HTN (hypertension)     Hypercholesterolemia     Skin cancer     Type 2 diabetes mellitus     Unilateral inguinal hernia without obstruction or gangrene     Unilateral inguinal hernia, with obstruction, without gangrene, not specified as recurrent

## 2025-02-28 ENCOUNTER — OUTPATIENT (OUTPATIENT)
Dept: OUTPATIENT SERVICES | Facility: HOSPITAL | Age: 65
LOS: 1 days | End: 2025-02-28
Payer: MEDICARE

## 2025-02-28 ENCOUNTER — RESULT REVIEW (OUTPATIENT)
Age: 65
End: 2025-02-28

## 2025-02-28 ENCOUNTER — TRANSCRIPTION ENCOUNTER (OUTPATIENT)
Age: 65
End: 2025-02-28

## 2025-02-28 ENCOUNTER — APPOINTMENT (OUTPATIENT)
Dept: SURGERY | Facility: HOSPITAL | Age: 65
End: 2025-02-28
Payer: COMMERCIAL

## 2025-02-28 VITALS
HEIGHT: 72 IN | TEMPERATURE: 98 F | WEIGHT: 255.07 LBS | HEART RATE: 74 BPM | OXYGEN SATURATION: 98 % | RESPIRATION RATE: 18 BRPM | SYSTOLIC BLOOD PRESSURE: 139 MMHG | DIASTOLIC BLOOD PRESSURE: 81 MMHG

## 2025-02-28 VITALS
SYSTOLIC BLOOD PRESSURE: 131 MMHG | TEMPERATURE: 98 F | HEART RATE: 72 BPM | RESPIRATION RATE: 16 BRPM | OXYGEN SATURATION: 99 % | DIASTOLIC BLOOD PRESSURE: 88 MMHG

## 2025-02-28 DIAGNOSIS — Z90.89 ACQUIRED ABSENCE OF OTHER ORGANS: Chronic | ICD-10-CM

## 2025-02-28 DIAGNOSIS — Z90.49 ACQUIRED ABSENCE OF OTHER SPECIFIED PARTS OF DIGESTIVE TRACT: Chronic | ICD-10-CM

## 2025-02-28 DIAGNOSIS — K40.30 UNILATERAL INGUINAL HERNIA, WITH OBSTRUCTION, WITHOUT GANGRENE, NOT SPECIFIED AS RECURRENT: ICD-10-CM

## 2025-02-28 DIAGNOSIS — Z01.818 ENCOUNTER FOR OTHER PREPROCEDURAL EXAMINATION: ICD-10-CM

## 2025-02-28 DIAGNOSIS — Z98.890 OTHER SPECIFIED POSTPROCEDURAL STATES: Chronic | ICD-10-CM

## 2025-02-28 LAB
GLUCOSE BLDC GLUCOMTR-MCNC: 125 MG/DL — HIGH (ref 70–99)
GLUCOSE BLDC GLUCOMTR-MCNC: 132 MG/DL — HIGH (ref 70–99)

## 2025-02-28 PROCEDURE — 49507 PRP I/HERN INIT BLOCK >5 YR: CPT | Mod: LT

## 2025-02-28 PROCEDURE — 55520 REMOVAL OF SPERM CORD LESION: CPT | Mod: 59,LT

## 2025-02-28 PROCEDURE — C1781: CPT

## 2025-02-28 PROCEDURE — 82962 GLUCOSE BLOOD TEST: CPT

## 2025-02-28 PROCEDURE — 88304 TISSUE EXAM BY PATHOLOGIST: CPT

## 2025-02-28 PROCEDURE — 88304 TISSUE EXAM BY PATHOLOGIST: CPT | Mod: 26

## 2025-02-28 DEVICE — MESH HERNIA PERFIX PLUG EXTRA LARGE 1.6 X 2": Type: IMPLANTABLE DEVICE | Site: LEFT | Status: FUNCTIONAL

## 2025-02-28 DEVICE — MESH HERNIA INGUINAL PARIETEX PROGRIP RECTANGLE 15 X 9CM: Type: IMPLANTABLE DEVICE | Site: LEFT | Status: FUNCTIONAL

## 2025-02-28 RX ORDER — NALOXONE HYDROCHLORIDE 0.4 MG/ML
1 INJECTION, SOLUTION INTRAMUSCULAR; INTRAVENOUS; SUBCUTANEOUS
Qty: 1 | Refills: 0
Start: 2025-02-28

## 2025-02-28 RX ORDER — DOCUSATE SODIUM 100 MG
1 CAPSULE ORAL
Qty: 45 | Refills: 0
Start: 2025-02-28 | End: 2025-03-14

## 2025-02-28 RX ORDER — CEFAZOLIN SODIUM IN 0.9 % NACL 3 G/100 ML
1000 INTRAVENOUS SOLUTION, PIGGYBACK (ML) INTRAVENOUS ONCE
Refills: 0 | Status: COMPLETED | OUTPATIENT
Start: 2025-02-28 | End: 2025-02-28

## 2025-02-28 RX ORDER — SODIUM CHLORIDE 9 G/1000ML
1000 INJECTION, SOLUTION INTRAVENOUS
Refills: 0 | Status: DISCONTINUED | OUTPATIENT
Start: 2025-02-28 | End: 2025-02-28

## 2025-02-28 RX ORDER — OXYCODONE HYDROCHLORIDE 30 MG/1
5 TABLET ORAL ONCE
Refills: 0 | Status: DISCONTINUED | OUTPATIENT
Start: 2025-02-28 | End: 2025-02-28

## 2025-02-28 RX ORDER — HYDROMORPHONE/SOD CHLOR,ISO/PF 2 MG/10 ML
0.5 SYRINGE (ML) INJECTION
Refills: 0 | Status: DISCONTINUED | OUTPATIENT
Start: 2025-02-28 | End: 2025-02-28

## 2025-02-28 RX ORDER — ACETAMINOPHEN 500 MG/5ML
2 LIQUID (ML) ORAL
Qty: 120 | Refills: 0
Start: 2025-02-28 | End: 2025-03-14

## 2025-02-28 RX ORDER — ONDANSETRON HCL/PF 4 MG/2 ML
4 VIAL (ML) INJECTION ONCE
Refills: 0 | Status: DISCONTINUED | OUTPATIENT
Start: 2025-02-28 | End: 2025-02-28

## 2025-02-28 RX ORDER — OXYCODONE HYDROCHLORIDE 30 MG/1
1 TABLET ORAL
Qty: 5 | Refills: 0
Start: 2025-02-28

## 2025-02-28 RX ORDER — IBUPROFEN 200 MG
1 TABLET ORAL
Qty: 15 | Refills: 0
Start: 2025-02-28 | End: 2025-03-04

## 2025-02-28 RX ADMIN — SODIUM CHLORIDE 75 MILLILITER(S): 9 INJECTION, SOLUTION INTRAVENOUS at 08:30

## 2025-02-28 RX ADMIN — SODIUM CHLORIDE 75 MILLILITER(S): 9 INJECTION, SOLUTION INTRAVENOUS at 12:10

## 2025-02-28 NOTE — ASU DISCHARGE PLAN (ADULT/PEDIATRIC) - NS MD DC FALL RISK RISK
For information on Fall & Injury Prevention, visit: [email or web address] OR  [email or web address] OR  https://www.cdc.gov/steadi/patient.html

## 2025-02-28 NOTE — ASU DISCHARGE PLAN (ADULT/PEDIATRIC) - ASU DC SPECIAL INSTRUCTIONSFT
You may shower in 24 hours.  Do not remove steri-strips.  Do not let water hit wound directly, do not rub incision.      No heavy lifting (nothing heavier than 5 lbs.), no strenuous physical activity, no exercise.      You may perform your usual daily tasks as tolerated.      You may resume your usual daily diet as tolerated.     Do not drive for 48 hours after anesthesia.  Do not drive while taking narcotic pain medications.  Do not drive until pain free.      You are encouraged to walk as much as possible to prevent blood clots in the legs, to maintain lung health after surgery/anesthesia, and to prevent constipation after surgery.      Continue to use the incentive spirometer at home.    For post-operative pain:  1. Take over-the-counter Extra-Strength Tylenol 500mg pills, take 2 pills every 6 hours regularly for the first 2-3 days after surgery, then as needed.    2. Take the prescription 400mg ibuprofen pills every 8 hours with food regularly for the first 2-3 days after surgery, then as needed.  3.  If you still have pain despite the Tylenol/Ibuprofen combination, then you may take the prescribed Oxycodone for severe pain   4. Apply ice packs to the left groin area for 15 mins 2-3 times a day    Take Colace while taking narcotic pain medications.      Resume all other home medications as instructed in the discharge medication reconciliation.    Follow-up with Dr. Henriquez in his office as per office instructions discussed during your pre-operative office consultation.

## 2025-02-28 NOTE — BRIEF OPERATIVE NOTE - COMMENTS
Repair Incarcerated  Left Inguinal Hernia with Mesh, Excision Lipoma Spermatic Cord, Ilioinguinal Nerve Block.

## 2025-02-28 NOTE — ASU DISCHARGE PLAN (ADULT/PEDIATRIC) - FINANCIAL ASSISTANCE
[facility] provides services at a reduced cost to those who are determined to be eligible through [facility]’s financial assistance program. Information regarding [facility]’s financial assistance program can be found by going to [email or web address] or by calling [phone].

## 2025-03-03 LAB — SURGICAL PATHOLOGY STUDY: SIGNIFICANT CHANGE UP

## 2025-03-17 ENCOUNTER — APPOINTMENT (OUTPATIENT)
Dept: SURGERY | Facility: CLINIC | Age: 65
End: 2025-03-17
Payer: MEDICARE

## 2025-03-17 DIAGNOSIS — Z09 ENCOUNTER FOR FOLLOW-UP EXAMINATION AFTER COMPLETED TREATMENT FOR CONDITIONS OTHER THAN MALIGNANT NEOPLASM: ICD-10-CM

## 2025-03-17 PROCEDURE — 99024 POSTOP FOLLOW-UP VISIT: CPT

## 2025-04-28 ENCOUNTER — APPOINTMENT (OUTPATIENT)
Dept: SURGERY | Facility: CLINIC | Age: 65
End: 2025-04-28

## 2025-05-29 ENCOUNTER — APPOINTMENT (OUTPATIENT)
Dept: SURGERY | Facility: CLINIC | Age: 65
End: 2025-05-29
Payer: MEDICARE

## 2025-05-29 DIAGNOSIS — R10.32 LEFT LOWER QUADRANT PAIN: ICD-10-CM

## 2025-05-29 PROCEDURE — 99214 OFFICE O/P EST MOD 30 MIN: CPT | Mod: 24

## (undated) DEVICE — DRAPE 3/4 SHEET W REINFORCEMENT 56X77"

## (undated) DEVICE — LIGASURE IMPACT

## (undated) DEVICE — SUT POLYSORB 2-0 30" V-20 UNDYED

## (undated) DEVICE — SUCTION YANKAUER NO CONTROL VENT

## (undated) DEVICE — SUT SOFSILK 2-0 30" V-20

## (undated) DEVICE — SUT POLYSORB 2-0 30" GS-22 UNDYED

## (undated) DEVICE — SUT POLYSORB 2-0 60" REEL

## (undated) DEVICE — DRAPE TOWEL BLUE 17" X 24"

## (undated) DEVICE — GLV 8 PROTEXIS (BLUE)

## (undated) DEVICE — VENODYNE/SCD SLEEVE CALF LARGE

## (undated) DEVICE — SUT POLYSORB 3-0 60" REEL

## (undated) DEVICE — SPONGE PEANUT AUTO COUNT

## (undated) DEVICE — PLV-SCD MACHINE: Type: DURABLE MEDICAL EQUIPMENT

## (undated) DEVICE — ELCTR BOVIE TIP BLADE INSULATED 2.75" EDGE

## (undated) DEVICE — SUT POLYSORB 4-0 18" P-12 UNDYED

## (undated) DEVICE — SOL IRR POUR NS 0.9% 1000ML

## (undated) DEVICE — SOL IRR POUR H2O 1000ML

## (undated) DEVICE — DRAPE INSTRUMENT POUCH 6.75" X 11"

## (undated) DEVICE — PLV/PSP-ESU FORCEFX F8J7721A: Type: DURABLE MEDICAL EQUIPMENT

## (undated) DEVICE — TUBING FOR SMOKE EVACUATOR (PURPLE END)

## (undated) DEVICE — SUT NDL MAYO CATGUT 1/2 CIRCLE TAPER POINT 0.050" X 1.092"

## (undated) DEVICE — GOWN SMARTGOWN XL/XLONG

## (undated) DEVICE — DRAIN PENROSE .25" X 18" LATEX

## (undated) DEVICE — SYR ASEPTO

## (undated) DEVICE — SUT POLYSORB 3-0 30" V-20 UNDYED

## (undated) DEVICE — WARMING BLANKET UPPER ADULT

## (undated) DEVICE — GLV 7.5 PROTEXIS (WHITE)

## (undated) DEVICE — PACK MINOR WITH LAP

## (undated) DEVICE — VENODYNE/SCD SLEEVE CALF MEDIUM